# Patient Record
Sex: FEMALE | Race: WHITE | Employment: OTHER | URBAN - NONMETROPOLITAN AREA
[De-identification: names, ages, dates, MRNs, and addresses within clinical notes are randomized per-mention and may not be internally consistent; named-entity substitution may affect disease eponyms.]

---

## 2017-01-03 RX ORDER — ALBUTEROL SULFATE 2.5 MG/3ML
SOLUTION RESPIRATORY (INHALATION)
Qty: 375 VIAL | Refills: 3 | Status: SHIPPED | OUTPATIENT
Start: 2017-01-03 | End: 2018-06-14 | Stop reason: SDUPTHER

## 2017-01-26 RX ORDER — BENZONATATE 100 MG/1
CAPSULE ORAL
Qty: 30 CAPSULE | Refills: 1 | Status: SHIPPED | OUTPATIENT
Start: 2017-01-26 | End: 2017-06-27 | Stop reason: SDUPTHER

## 2017-02-27 RX ORDER — EFAVIRENZ, EMTRICITABINE AND TENOFOVIR DISOPROXIL FUMARATE 600; 200; 300 MG/1; MG/1; MG/1
1 TABLET, FILM COATED ORAL NIGHTLY
Qty: 30 TABLET | Refills: 2 | Status: SHIPPED | OUTPATIENT
Start: 2017-02-27 | End: 2017-06-27 | Stop reason: SDUPTHER

## 2017-03-29 RX ORDER — CETIRIZINE HCL/PSEUDOEPHEDRINE 5 MG-120MG
TABLET, EXTENDED RELEASE 12 HR ORAL
Qty: 60 TABLET | Refills: 3 | Status: SHIPPED | OUTPATIENT
Start: 2017-03-29 | End: 2017-07-25 | Stop reason: SDUPTHER

## 2017-04-03 ENCOUNTER — TELEPHONE (OUTPATIENT)
Dept: PULMONOLOGY | Age: 57
End: 2017-04-03

## 2017-04-03 DIAGNOSIS — J43.1 PANLOBULAR EMPHYSEMA (HCC): Primary | ICD-10-CM

## 2017-04-26 ENCOUNTER — OFFICE VISIT (OUTPATIENT)
Dept: FAMILY MEDICINE CLINIC | Age: 57
End: 2017-04-26

## 2017-04-26 VITALS
DIASTOLIC BLOOD PRESSURE: 74 MMHG | HEART RATE: 88 BPM | SYSTOLIC BLOOD PRESSURE: 116 MMHG | HEIGHT: 65 IN | RESPIRATION RATE: 16 BRPM | WEIGHT: 166 LBS | BODY MASS INDEX: 27.66 KG/M2

## 2017-04-26 DIAGNOSIS — I25.10 CORONARY ARTERY DISEASE INVOLVING NATIVE CORONARY ARTERY OF NATIVE HEART WITHOUT ANGINA PECTORIS: ICD-10-CM

## 2017-04-26 DIAGNOSIS — E78.5 HYPERLIPIDEMIA, UNSPECIFIED HYPERLIPIDEMIA TYPE: ICD-10-CM

## 2017-04-26 DIAGNOSIS — M25.561 RIGHT KNEE PAIN, UNSPECIFIED CHRONICITY: Primary | ICD-10-CM

## 2017-04-26 DIAGNOSIS — G89.4 CHRONIC PAIN SYNDROME: ICD-10-CM

## 2017-04-26 DIAGNOSIS — K21.9 GASTROESOPHAGEAL REFLUX DISEASE, ESOPHAGITIS PRESENCE NOT SPECIFIED: ICD-10-CM

## 2017-04-26 DIAGNOSIS — F31.9 BIPOLAR DISEASE, CHRONIC (HCC): ICD-10-CM

## 2017-04-26 DIAGNOSIS — B20 HIV (HUMAN IMMUNODEFICIENCY VIRUS INFECTION) (HCC): ICD-10-CM

## 2017-04-26 DIAGNOSIS — J44.9 CHRONIC OBSTRUCTIVE ASTHMA (HCC): Chronic | ICD-10-CM

## 2017-04-26 PROCEDURE — 99214 OFFICE O/P EST MOD 30 MIN: CPT | Performed by: FAMILY MEDICINE

## 2017-04-26 RX ORDER — ALBUTEROL SULFATE 90 UG/1
AEROSOL, METERED RESPIRATORY (INHALATION)
Qty: 36 G | Refills: 1 | Status: SHIPPED | OUTPATIENT
Start: 2017-04-26 | End: 2017-07-25 | Stop reason: SDUPTHER

## 2017-04-26 RX ORDER — BENZONATATE 100 MG/1
CAPSULE ORAL
Qty: 30 CAPSULE | Refills: 1 | Status: CANCELLED | OUTPATIENT
Start: 2017-04-26

## 2017-04-26 RX ORDER — TRAZODONE HYDROCHLORIDE 100 MG/1
100 TABLET ORAL NIGHTLY
Status: CANCELLED | OUTPATIENT
Start: 2017-04-26

## 2017-04-26 ASSESSMENT — PATIENT HEALTH QUESTIONNAIRE - PHQ9
1. LITTLE INTEREST OR PLEASURE IN DOING THINGS: 0
SUM OF ALL RESPONSES TO PHQ9 QUESTIONS 1 & 2: 0
2. FEELING DOWN, DEPRESSED OR HOPELESS: 0
SUM OF ALL RESPONSES TO PHQ QUESTIONS 1-9: 0

## 2017-04-28 DIAGNOSIS — M25.561 CHRONIC PAIN OF RIGHT KNEE: Primary | ICD-10-CM

## 2017-04-28 DIAGNOSIS — G89.29 CHRONIC PAIN OF RIGHT KNEE: Primary | ICD-10-CM

## 2017-04-30 ASSESSMENT — ENCOUNTER SYMPTOMS
RESPIRATORY NEGATIVE: 1
GASTROINTESTINAL NEGATIVE: 1

## 2017-05-08 ENCOUNTER — TELEPHONE (OUTPATIENT)
Dept: FAMILY MEDICINE CLINIC | Age: 57
End: 2017-05-08

## 2017-06-14 PROBLEM — J96.22 ACUTE ON CHRONIC RESPIRATORY FAILURE WITH HYPOXIA AND HYPERCAPNIA (HCC): Status: ACTIVE | Noted: 2017-06-14

## 2017-06-14 PROBLEM — J96.21 ACUTE ON CHRONIC RESPIRATORY FAILURE WITH HYPOXIA AND HYPERCAPNIA (HCC): Status: ACTIVE | Noted: 2017-06-14

## 2017-06-19 ENCOUNTER — TELEPHONE (OUTPATIENT)
Dept: FAMILY MEDICINE CLINIC | Age: 57
End: 2017-06-19

## 2017-06-27 ENCOUNTER — OFFICE VISIT (OUTPATIENT)
Dept: FAMILY MEDICINE CLINIC | Age: 57
End: 2017-06-27

## 2017-06-27 VITALS
RESPIRATION RATE: 14 BRPM | DIASTOLIC BLOOD PRESSURE: 70 MMHG | WEIGHT: 161 LBS | SYSTOLIC BLOOD PRESSURE: 106 MMHG | OXYGEN SATURATION: 92 % | HEART RATE: 84 BPM | HEIGHT: 64 IN | BODY MASS INDEX: 27.49 KG/M2

## 2017-06-27 DIAGNOSIS — J44.1 COPD WITH EXACERBATION (HCC): Primary | ICD-10-CM

## 2017-06-27 DIAGNOSIS — J96.22 ACUTE ON CHRONIC RESPIRATORY FAILURE WITH HYPOXIA AND HYPERCAPNIA (HCC): ICD-10-CM

## 2017-06-27 DIAGNOSIS — Z72.0 TOBACCO ABUSE: ICD-10-CM

## 2017-06-27 DIAGNOSIS — J96.21 ACUTE ON CHRONIC RESPIRATORY FAILURE WITH HYPOXIA AND HYPERCAPNIA (HCC): ICD-10-CM

## 2017-06-27 PROCEDURE — 99495 TRANSJ CARE MGMT MOD F2F 14D: CPT | Performed by: FAMILY MEDICINE

## 2017-06-27 RX ORDER — BENZONATATE 100 MG/1
CAPSULE ORAL
Qty: 30 CAPSULE | Refills: 1 | Status: SHIPPED | OUTPATIENT
Start: 2017-06-27 | End: 2017-07-25

## 2017-06-27 RX ORDER — EFAVIRENZ, EMTRICITABINE AND TENOFOVIR DISOPROXIL FUMARATE 600; 200; 300 MG/1; MG/1; MG/1
1 TABLET, FILM COATED ORAL NIGHTLY
Qty: 30 TABLET | Refills: 2 | Status: CANCELLED | OUTPATIENT
Start: 2017-06-27

## 2017-06-27 RX ORDER — HYDROCODONE BITARTRATE AND ACETAMINOPHEN 5; 325 MG/1; MG/1
TABLET ORAL
Refills: 0 | COMMUNITY
Start: 2017-05-25 | End: 2017-10-09

## 2017-06-27 RX ORDER — DOXYCYCLINE 100 MG/1
CAPSULE ORAL
COMMUNITY
Start: 2017-06-16 | End: 2018-06-12

## 2017-06-27 RX ORDER — EFAVIRENZ, EMTRICITABINE, AND TENOFOVIR DISOPROXIL FUMARATE 600; 200; 300 MG/1; MG/1; MG/1
TABLET, FILM COATED ORAL
Qty: 30 TABLET | Refills: 2 | Status: SHIPPED | OUTPATIENT
Start: 2017-06-27 | End: 2019-07-09 | Stop reason: SDUPTHER

## 2017-07-24 ENCOUNTER — HOSPITAL ENCOUNTER (OUTPATIENT)
Dept: PULMONOLOGY | Age: 57
Discharge: HOME | End: 2017-07-24
Payer: COMMERCIAL

## 2017-07-24 DIAGNOSIS — J44.1 COPD WITH EXACERBATION (HCC): ICD-10-CM

## 2017-07-24 PROCEDURE — 94726 PLETHYSMOGRAPHY LUNG VOLUMES: CPT

## 2017-07-24 PROCEDURE — 94060 EVALUATION OF WHEEZING: CPT

## 2017-07-24 PROCEDURE — 94729 DIFFUSING CAPACITY: CPT

## 2017-07-25 ENCOUNTER — OFFICE VISIT (OUTPATIENT)
Dept: FAMILY MEDICINE CLINIC | Age: 57
End: 2017-07-25
Payer: COMMERCIAL

## 2017-07-25 VITALS
BODY MASS INDEX: 27.52 KG/M2 | HEART RATE: 88 BPM | WEIGHT: 161.2 LBS | RESPIRATION RATE: 16 BRPM | HEIGHT: 64 IN | TEMPERATURE: 98.1 F | DIASTOLIC BLOOD PRESSURE: 60 MMHG | SYSTOLIC BLOOD PRESSURE: 120 MMHG

## 2017-07-25 DIAGNOSIS — J44.9 COPD, SEVERE (HCC): Primary | ICD-10-CM

## 2017-07-25 PROCEDURE — 99213 OFFICE O/P EST LOW 20 MIN: CPT | Performed by: FAMILY MEDICINE

## 2017-07-25 RX ORDER — ALBUTEROL SULFATE 90 UG/1
AEROSOL, METERED RESPIRATORY (INHALATION)
Qty: 36 G | Refills: 1 | Status: ON HOLD | OUTPATIENT
Start: 2017-07-25 | End: 2018-07-24 | Stop reason: SDUPTHER

## 2017-07-25 RX ORDER — CETIRIZINE HYDROCHLORIDE, PSEUDOEPHEDRINE HYDROCHLORIDE 5; 120 MG/1; MG/1
TABLET, FILM COATED, EXTENDED RELEASE ORAL
Qty: 60 TABLET | Refills: 2 | Status: SHIPPED | OUTPATIENT
Start: 2017-07-25 | End: 2018-06-14 | Stop reason: SDUPTHER

## 2017-07-25 RX ORDER — GUAIFENESIN 600 MG/1
600 TABLET, EXTENDED RELEASE ORAL 2 TIMES DAILY
Qty: 60 TABLET | Refills: 2 | Status: SHIPPED | OUTPATIENT
Start: 2017-07-25 | End: 2017-10-20 | Stop reason: SDUPTHER

## 2017-07-25 RX ORDER — DOXYCYCLINE 100 MG/1
CAPSULE ORAL
Status: CANCELLED | OUTPATIENT
Start: 2017-07-25

## 2017-07-25 ASSESSMENT — ENCOUNTER SYMPTOMS
WHEEZING: 0
COUGH: 1
GASTROINTESTINAL NEGATIVE: 1
SHORTNESS OF BREATH: 0

## 2017-08-08 RX ORDER — BENZONATATE 100 MG/1
CAPSULE ORAL
Qty: 30 CAPSULE | Refills: 10 | Status: SHIPPED | OUTPATIENT
Start: 2017-08-08 | End: 2018-08-15 | Stop reason: SDUPTHER

## 2017-08-23 RX ORDER — EFAVIRENZ, EMTRICITABINE, AND TENOFOVIR DISOPROXIL FUMARATE 600; 200; 300 MG/1; MG/1; MG/1
TABLET, FILM COATED ORAL
Qty: 30 TABLET | Refills: 1 | OUTPATIENT
Start: 2017-08-23

## 2017-08-25 RX ORDER — EFAVIRENZ, EMTRICITABINE AND TENOFOVIR DISOPROXIL FUMARATE 600; 200; 300 MG/1; MG/1; MG/1
TABLET, FILM COATED ORAL
Qty: 30 TABLET | Refills: 2 | OUTPATIENT
Start: 2017-08-25

## 2017-09-18 RX ORDER — MIRTAZAPINE 30 MG/1
15-30 TABLET, FILM COATED ORAL NIGHTLY
Qty: 30 TABLET | Refills: 2 | Status: SHIPPED | OUTPATIENT
Start: 2017-09-18 | End: 2017-11-22 | Stop reason: SDUPTHER

## 2017-10-09 ENCOUNTER — APPOINTMENT (OUTPATIENT)
Dept: GENERAL RADIOLOGY | Age: 57
End: 2017-10-09
Payer: COMMERCIAL

## 2017-10-09 ENCOUNTER — HOSPITAL ENCOUNTER (EMERGENCY)
Age: 57
Discharge: HOME | End: 2017-10-09
Attending: FAMILY MEDICINE
Payer: COMMERCIAL

## 2017-10-09 VITALS
SYSTOLIC BLOOD PRESSURE: 105 MMHG | HEIGHT: 65 IN | OXYGEN SATURATION: 95 % | WEIGHT: 164 LBS | TEMPERATURE: 98.1 F | BODY MASS INDEX: 27.32 KG/M2 | RESPIRATION RATE: 18 BRPM | DIASTOLIC BLOOD PRESSURE: 71 MMHG | HEART RATE: 56 BPM

## 2017-10-09 DIAGNOSIS — J20.9 ACUTE BRONCHITIS, UNSPECIFIED ORGANISM: Primary | ICD-10-CM

## 2017-10-09 DIAGNOSIS — J02.9 ACUTE PHARYNGITIS, UNSPECIFIED ETIOLOGY: ICD-10-CM

## 2017-10-09 LAB
ALLEN TEST: POSITIVE
ANION GAP SERPL CALCULATED.3IONS-SCNC: 12 MEQ/L (ref 8–16)
ANISOCYTOSIS: ABNORMAL
BASE EXCESS (CALCULATED): 0.9 MMOL/L (ref -2.5–2.5)
BASOPHILS # BLD: 0.8 %
BASOPHILS ABSOLUTE: 0.1 THOU/MM3 (ref 0–0.1)
BUN BLDV-MCNC: 11 MG/DL (ref 7–22)
CALCIUM SERPL-MCNC: 9 MG/DL (ref 8.5–10.5)
CHLORIDE BLD-SCNC: 104 MEQ/L (ref 98–111)
CO2: 23 MEQ/L (ref 23–33)
COLLECTED BY:: ABNORMAL
CREAT SERPL-MCNC: 0.6 MG/DL (ref 0.4–1.2)
DEVICE: ABNORMAL
EOSINOPHIL # BLD: 2.7 %
EOSINOPHILS ABSOLUTE: 0.2 THOU/MM3 (ref 0–0.4)
GFR SERPL CREATININE-BSD FRML MDRD: > 90 ML/MIN/1.73M2
GLUCOSE BLD-MCNC: 87 MG/DL (ref 70–108)
HCO3: 24 MMOL/L (ref 23–28)
HCT VFR BLD CALC: 42.2 % (ref 37–47)
HEMOGLOBIN: 14.2 GM/DL (ref 12–16)
LACTIC ACID: 0.8 MMOL/L (ref 0.5–2.2)
LYMPHOCYTES # BLD: 31.1 %
LYMPHOCYTES ABSOLUTE: 2.3 THOU/MM3 (ref 1–4.8)
MCH RBC QN AUTO: 31.7 PG (ref 27–31)
MCHC RBC AUTO-ENTMCNC: 33.7 GM/DL (ref 33–37)
MCV RBC AUTO: 93.9 FL (ref 81–99)
MONOCYTES # BLD: 8.4 %
MONOCYTES ABSOLUTE: 0.6 THOU/MM3 (ref 0.4–1.3)
NUCLEATED RED BLOOD CELLS: 0 /100 WBC
O2 SATURATION: 97 %
OSMOLALITY CALCULATION: 276.3 MOSMOL/KG (ref 275–300)
PCO2: 35 MMHG (ref 35–45)
PDW BLD-RTO: 14.5 % (ref 11.5–14.5)
PH BLOOD GAS: 7.46 (ref 7.35–7.45)
PLATELET # BLD: 284 THOU/MM3 (ref 130–400)
PMV BLD AUTO: 8.6 MCM (ref 7.4–10.4)
PO2: 80 MMHG (ref 71–104)
POTASSIUM SERPL-SCNC: 4.2 MEQ/L (ref 3.5–5.2)
PROCALCITONIN: < 0.02 NG/ML (ref 0.01–0.09)
RBC # BLD: 4.49 MILL/MM3 (ref 4.2–5.4)
RBC # BLD: NORMAL 10*6/UL
SEG NEUTROPHILS: 57 %
SEGMENTED NEUTROPHILS ABSOLUTE COUNT: 4.2 THOU/MM3 (ref 1.8–7.7)
SODIUM BLD-SCNC: 139 MEQ/L (ref 135–145)
SOURCE, BLOOD GAS: ABNORMAL
WBC # BLD: 7.3 THOU/MM3 (ref 4.8–10.8)

## 2017-10-09 PROCEDURE — 36415 COLL VENOUS BLD VENIPUNCTURE: CPT

## 2017-10-09 PROCEDURE — 82803 BLOOD GASES ANY COMBINATION: CPT

## 2017-10-09 PROCEDURE — 6360000002 HC RX W HCPCS: Performed by: FAMILY MEDICINE

## 2017-10-09 PROCEDURE — 6370000000 HC RX 637 (ALT 250 FOR IP): Performed by: FAMILY MEDICINE

## 2017-10-09 PROCEDURE — 94640 AIRWAY INHALATION TREATMENT: CPT

## 2017-10-09 PROCEDURE — 71010 XR CHEST PORTABLE: CPT

## 2017-10-09 PROCEDURE — 85025 COMPLETE CBC W/AUTO DIFF WBC: CPT

## 2017-10-09 PROCEDURE — 99283 EMERGENCY DEPT VISIT LOW MDM: CPT

## 2017-10-09 PROCEDURE — 2580000003 HC RX 258: Performed by: FAMILY MEDICINE

## 2017-10-09 PROCEDURE — 96374 THER/PROPH/DIAG INJ IV PUSH: CPT

## 2017-10-09 PROCEDURE — 84145 PROCALCITONIN (PCT): CPT

## 2017-10-09 PROCEDURE — 80048 BASIC METABOLIC PNL TOTAL CA: CPT

## 2017-10-09 PROCEDURE — 96361 HYDRATE IV INFUSION ADD-ON: CPT

## 2017-10-09 PROCEDURE — 87040 BLOOD CULTURE FOR BACTERIA: CPT

## 2017-10-09 PROCEDURE — 83605 ASSAY OF LACTIC ACID: CPT

## 2017-10-09 PROCEDURE — 36600 WITHDRAWAL OF ARTERIAL BLOOD: CPT

## 2017-10-09 RX ORDER — 0.9 % SODIUM CHLORIDE 0.9 %
500 INTRAVENOUS SOLUTION INTRAVENOUS ONCE
Status: COMPLETED | OUTPATIENT
Start: 2017-10-09 | End: 2017-10-09

## 2017-10-09 RX ORDER — HYDROCODONE BITARTRATE AND ACETAMINOPHEN 5; 325 MG/1; MG/1
1 TABLET ORAL ONCE
Status: COMPLETED | OUTPATIENT
Start: 2017-10-09 | End: 2017-10-09

## 2017-10-09 RX ORDER — SODIUM CHLORIDE 9 MG/ML
INJECTION, SOLUTION INTRAVENOUS CONTINUOUS
Status: DISCONTINUED | OUTPATIENT
Start: 2017-10-09 | End: 2017-10-09 | Stop reason: HOSPADM

## 2017-10-09 RX ORDER — AMOXICILLIN 250 MG/1
250 CAPSULE ORAL ONCE
Status: COMPLETED | OUTPATIENT
Start: 2017-10-09 | End: 2017-10-09

## 2017-10-09 RX ORDER — AMOXICILLIN AND CLAVULANATE POTASSIUM 500; 125 MG/1; MG/1
1 TABLET, FILM COATED ORAL 3 TIMES DAILY
Qty: 30 TABLET | Refills: 0 | Status: SHIPPED | OUTPATIENT
Start: 2017-10-09 | End: 2017-10-19

## 2017-10-09 RX ORDER — IPRATROPIUM BROMIDE AND ALBUTEROL SULFATE 2.5; .5 MG/3ML; MG/3ML
1 SOLUTION RESPIRATORY (INHALATION) ONCE
Status: COMPLETED | OUTPATIENT
Start: 2017-10-09 | End: 2017-10-09

## 2017-10-09 RX ORDER — METHYLPREDNISOLONE SODIUM SUCCINATE 40 MG/ML
40 INJECTION, POWDER, LYOPHILIZED, FOR SOLUTION INTRAMUSCULAR; INTRAVENOUS ONCE
Status: COMPLETED | OUTPATIENT
Start: 2017-10-09 | End: 2017-10-09

## 2017-10-09 RX ORDER — AMOXICILLIN AND CLAVULANATE POTASSIUM 250; 125 MG/1; MG/1
1 TABLET, FILM COATED ORAL ONCE
Status: COMPLETED | OUTPATIENT
Start: 2017-10-09 | End: 2017-10-09

## 2017-10-09 RX ADMIN — AMOXICILLIN 250 MG: 250 CAPSULE ORAL at 13:05

## 2017-10-09 RX ADMIN — HYDROCODONE BITARTRATE AND ACETAMINOPHEN 1 TABLET: 5; 325 TABLET ORAL at 11:33

## 2017-10-09 RX ADMIN — IPRATROPIUM BROMIDE AND ALBUTEROL SULFATE 1 AMPULE: .5; 3 SOLUTION RESPIRATORY (INHALATION) at 11:21

## 2017-10-09 RX ADMIN — AMOXICILLIN AND CLAVULANATE POTASSIUM 1 TABLET: 250; 125 TABLET, FILM COATED ORAL at 13:05

## 2017-10-09 RX ADMIN — METHYLPREDNISOLONE SODIUM SUCCINATE 40 MG: 40 INJECTION, POWDER, FOR SOLUTION INTRAMUSCULAR; INTRAVENOUS at 11:33

## 2017-10-09 RX ADMIN — SODIUM CHLORIDE 500 ML: 9 INJECTION, SOLUTION INTRAVENOUS at 11:32

## 2017-10-09 ASSESSMENT — ENCOUNTER SYMPTOMS
EYE DISCHARGE: 0
SORE THROAT: 1
COUGH: 1
SHORTNESS OF BREATH: 1
ABDOMINAL PAIN: 0
WHEEZING: 1

## 2017-10-09 ASSESSMENT — PAIN DESCRIPTION - PAIN TYPE: TYPE: ACUTE PAIN

## 2017-10-09 ASSESSMENT — PAIN SCALES - GENERAL
PAINLEVEL_OUTOF10: 4
PAINLEVEL_OUTOF10: 10

## 2017-10-09 ASSESSMENT — PAIN DESCRIPTION - LOCATION: LOCATION: THROAT

## 2017-10-09 NOTE — ED PROVIDER NOTES
[facility]  eMERGENCY dEPARTMENT eNCOUnter          CHIEF COMPLAINT       Chief Complaint   Patient presents with    Pharyngitis       Nurses Notes reviewed and I agree except as noted in the HPI. HISTORY OF PRESENT ILLNESS    Nicolas Davis is a 62 y.o. female who presents to the Emergency Department for the evaluation of sore throat. The patient states she developed a sore throat three days ago and it has gotten progressively worse. She is rating her current pain as an 8/10 in severity and describing it as sharp in character. The patient states the pain is constant and worse with talking. She denies any relief with aleve at home. She states she is having troubles swallowing secondary to pain, and has not been able to sleep from the pain. The patient reports associated cough. She reports history of COPD and has used albuterol inhaler and nebulizer without relief. The patient states she quit smoking 2 weeks ago, but has a 50 pack year history. Location/Symptom: sore throat  Timing/Onset: 3 days ago  Context/Setting: troubles swallowing and sleeping secondary to pain, associated cough, history of COPD, 50 pack year history  Quality: sharp  Duration: constant  Modifying Factors: worse with talking or trying to eat, no relief with aleve  Severity: 8/10    The HPI was provided by the patient. REVIEW OF SYSTEMS     Review of Systems   Constitutional: Negative for chills and fever. HENT: Positive for congestion and sore throat. Eyes: Negative for discharge. Respiratory: Positive for cough, shortness of breath and wheezing. Cardiovascular: Negative for chest pain. Gastrointestinal: Negative for abdominal pain. Genitourinary: Negative for decreased urine volume. Musculoskeletal: Negative for joint swelling. Skin: Negative for rash. Neurological: Negative for weakness and numbness. Psychiatric/Behavioral: Negative for confusion.        PAST MEDICAL HISTORY    has a past times daily    TUDORZA PRESSAIR 400 MCG/ACT AEPB INHALER    inhale 1 puff INTO THE LUNGS twice a day       ALLERGIES     is allergic to aspirin; biaxin [clarithromycin]; levofloxacin; and vancomycin. FAMILY HISTORY     indicated that her mother is . She indicated that her father is . family history includes Asthma in her father and mother; [place] in her father; Heart Disease in her mother; Uterine Cancer in her mother. SOCIAL HISTORY      reports that she quit smoking about 3 months ago. Her smoking use included Cigarettes. She started smoking about 43 years ago. She has a 9.75 pack-year smoking history. She has never used smokeless tobacco. She reports that she does not drink alcohol or use drugs. PHYSICAL EXAM     INITIAL VITALS:  height is 5' 5\" (1.651 m) and weight is 164 lb (74.4 kg). Her oral temperature is 98.1 °F (36.7 °C). Her blood pressure is 138/84 and her pulse is 58. Her respiration is 18 and oxygen saturation is 95%. Physical Exam   Constitutional: She is oriented to person, place, and time. She appears well-developed and well-nourished. GCS 15   HENT:   Head: Normocephalic and atraumatic. Ear canals clear TMs normal    Nasal obstruction bilateral    Throat slight redness no exudates   Eyes: Conjunctivae and EOM are normal. Pupils are equal, round, and reactive to light. Right eye exhibits no discharge. Left eye exhibits no discharge. Neck: Normal range of motion. Neck supple. Cardiovascular: Normal rate and regular rhythm. Pulmonary/Chest:   Scattered rhonchi   Abdominal: Soft. Bowel sounds are normal. There is no tenderness. There is no rebound and no guarding. Musculoskeletal: Normal range of motion. She exhibits no edema. Lymphadenopathy:     She has no cervical adenopathy. Neurological: She is alert and oriented to person, place, and time. Skin: Skin is warm and dry. Psychiatric: She has a normal mood and affect.  Her behavior is normal. DO Chacorta Arrington, [address] [facility]  [phone]    In 3 days        DISCHARGE MEDICATIONS:  New Prescriptions    AMOXICILLIN-CLAVULANATE (AUGMENTIN) 500-125 MG PER TABLET    Take 1 tablet by mouth 3 times daily for 10 days       (Please note that portions of this note were completed with a voice recognition program.  Efforts were made to edit the dictations but occasionally words are mis-transcribed.)    Scribe:  Calin Schaefer 10/9/17 11:11 AM Scribing for and in the presence of Carli Coppola MD.    Signed by: Jackson Wolfe, 10/09/17 12:35 PM    Provider:  I personally performed the services described in the documentation, reviewed and edited the documentation which was dictated to the scribe in my presence, and it accurately records my words and actions.     Carli Coppola MD 10/9/17 12:35 PM                      Carli Coppola MD  10/09/17 [address] [place]  10/09/17 0922

## 2017-10-15 LAB
BLOOD CULTURE, ROUTINE: NORMAL
BLOOD CULTURE, ROUTINE: NORMAL

## 2017-10-26 ENCOUNTER — OFFICE VISIT (OUTPATIENT)
Dept: PHYSICAL MEDICINE AND REHAB | Age: 57
End: 2017-10-26
Payer: COMMERCIAL

## 2017-10-26 VITALS
HEIGHT: 65 IN | HEART RATE: 83 BPM | WEIGHT: 164 LBS | SYSTOLIC BLOOD PRESSURE: 116 MMHG | BODY MASS INDEX: 27.32 KG/M2 | DIASTOLIC BLOOD PRESSURE: 65 MMHG

## 2017-10-26 DIAGNOSIS — G89.4 CHRONIC PAIN SYNDROME: ICD-10-CM

## 2017-10-26 DIAGNOSIS — M17.11 PRIMARY OSTEOARTHRITIS OF RIGHT KNEE: Primary | ICD-10-CM

## 2017-10-26 PROCEDURE — G8484 FLU IMMUNIZE NO ADMIN: HCPCS | Performed by: PAIN MEDICINE

## 2017-10-26 PROCEDURE — 99244 OFF/OP CNSLTJ NEW/EST MOD 40: CPT | Performed by: PAIN MEDICINE

## 2017-10-26 PROCEDURE — G8417 CALC BMI ABV UP PARAM F/U: HCPCS | Performed by: PAIN MEDICINE

## 2017-10-26 PROCEDURE — 3014F SCREEN MAMMO DOC REV: CPT | Performed by: PAIN MEDICINE

## 2017-10-26 PROCEDURE — 3017F COLORECTAL CA SCREEN DOC REV: CPT | Performed by: PAIN MEDICINE

## 2017-10-26 PROCEDURE — G8427 DOCREV CUR MEDS BY ELIG CLIN: HCPCS | Performed by: PAIN MEDICINE

## 2017-10-26 ASSESSMENT — ENCOUNTER SYMPTOMS
CONSTIPATION: 0
DIARRHEA: 0
EYE PAIN: 0
CHEST TIGHTNESS: 0
BACK PAIN: 1
RHINORRHEA: 0
SORE THROAT: 0
SINUS PRESSURE: 0
NAUSEA: 1
VOMITING: 1
ABDOMINAL PAIN: 0
PHOTOPHOBIA: 0
WHEEZING: 0
SHORTNESS OF BREATH: 0
COLOR CHANGE: 0
COUGH: 0
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
## 2017-11-22 RX ORDER — MIRTAZAPINE 30 MG/1
15-30 TABLET, FILM COATED ORAL NIGHTLY
Qty: 30 TABLET | Refills: 2 | Status: SHIPPED | OUTPATIENT
Start: 2017-11-22 | End: 2018-06-14 | Stop reason: SDUPTHER

## 2017-12-18 ENCOUNTER — TELEPHONE (OUTPATIENT)
Dept: FAMILY MEDICINE CLINIC | Age: 57
End: 2017-12-18

## 2017-12-18 NOTE — TELEPHONE ENCOUNTER
Patient notified. She was not happy said Dr Jhonny Grullon told her to call and as Dr aBri Martinez for refill. I advised her to call Dr Jhonny Grullon since he is the one that originally prescribed it for her.

## 2017-12-18 NOTE — TELEPHONE ENCOUNTER
Patient is calling in asking for Dr Abida Phan to prescribe her norco for her pain from the HIV to [employer] [address]. She said she is in a lot of pain, her whole body aches and she needs something to help. Please call her and advise.

## 2018-04-27 ENCOUNTER — OFFICE VISIT (OUTPATIENT)
Dept: CARDIOLOGY CLINIC | Age: 58
End: 2018-04-27
Payer: COMMERCIAL

## 2018-04-27 VITALS
HEIGHT: 65 IN | BODY MASS INDEX: 25.99 KG/M2 | WEIGHT: 156 LBS | DIASTOLIC BLOOD PRESSURE: 60 MMHG | SYSTOLIC BLOOD PRESSURE: 100 MMHG

## 2018-04-27 DIAGNOSIS — I25.10 CORONARY ARTERY DISEASE INVOLVING NATIVE CORONARY ARTERY OF NATIVE HEART WITHOUT ANGINA PECTORIS: Primary | ICD-10-CM

## 2018-04-27 PROCEDURE — G8599 NO ASA/ANTIPLAT THER USE RNG: HCPCS | Performed by: INTERNAL MEDICINE

## 2018-04-27 PROCEDURE — 99214 OFFICE O/P EST MOD 30 MIN: CPT | Performed by: INTERNAL MEDICINE

## 2018-04-27 PROCEDURE — 3017F COLORECTAL CA SCREEN DOC REV: CPT | Performed by: INTERNAL MEDICINE

## 2018-04-27 PROCEDURE — 1036F TOBACCO NON-USER: CPT | Performed by: INTERNAL MEDICINE

## 2018-04-27 PROCEDURE — 93000 ELECTROCARDIOGRAM COMPLETE: CPT | Performed by: INTERNAL MEDICINE

## 2018-04-27 PROCEDURE — G8417 CALC BMI ABV UP PARAM F/U: HCPCS | Performed by: INTERNAL MEDICINE

## 2018-04-27 PROCEDURE — G8427 DOCREV CUR MEDS BY ELIG CLIN: HCPCS | Performed by: INTERNAL MEDICINE

## 2018-04-27 ASSESSMENT — ENCOUNTER SYMPTOMS
BACK PAIN: 0
ORTHOPNEA: 0
EYE DISCHARGE: 0
SHORTNESS OF BREATH: 0
BLOATING: 0
DIARRHEA: 0
HEMOPTYSIS: 0
EYE PAIN: 0
ABDOMINAL PAIN: 0
CONSTIPATION: 0
BLURRED VISION: 0
SPUTUM PRODUCTION: 0
HOARSE VOICE: 0
BOWEL INCONTINENCE: 0
COUGH: 0
DOUBLE VISION: 0
CHANGE IN BOWEL HABIT: 0

## 2018-04-30 ENCOUNTER — OFFICE VISIT (OUTPATIENT)
Dept: FAMILY MEDICINE CLINIC | Age: 58
End: 2018-04-30
Payer: COMMERCIAL

## 2018-04-30 VITALS
SYSTOLIC BLOOD PRESSURE: 116 MMHG | HEIGHT: 64 IN | RESPIRATION RATE: 20 BRPM | DIASTOLIC BLOOD PRESSURE: 64 MMHG | HEART RATE: 84 BPM | WEIGHT: 157.4 LBS | BODY MASS INDEX: 26.87 KG/M2 | OXYGEN SATURATION: 97 %

## 2018-04-30 DIAGNOSIS — N39.3 STRESS INCONTINENCE: ICD-10-CM

## 2018-04-30 DIAGNOSIS — B20 HIV (HUMAN IMMUNODEFICIENCY VIRUS INFECTION) (HCC): ICD-10-CM

## 2018-04-30 DIAGNOSIS — J44.9 CHRONIC OBSTRUCTIVE PULMONARY DISEASE, UNSPECIFIED COPD TYPE (HCC): Primary | ICD-10-CM

## 2018-04-30 DIAGNOSIS — D84.9 IMMUNOCOMPROMISED STATE (HCC): ICD-10-CM

## 2018-04-30 DIAGNOSIS — F20.9 SCHIZOPHRENIA, UNSPECIFIED TYPE (HCC): ICD-10-CM

## 2018-04-30 DIAGNOSIS — I25.10 CORONARY ARTERY DISEASE INVOLVING NATIVE CORONARY ARTERY OF NATIVE HEART WITHOUT ANGINA PECTORIS: ICD-10-CM

## 2018-04-30 DIAGNOSIS — Z12.39 SCREENING FOR BREAST CANCER: ICD-10-CM

## 2018-04-30 DIAGNOSIS — J45.20 MILD INTERMITTENT REACTIVE AIRWAY DISEASE WITHOUT COMPLICATION: ICD-10-CM

## 2018-04-30 DIAGNOSIS — G89.4 CHRONIC PAIN SYNDROME: ICD-10-CM

## 2018-04-30 DIAGNOSIS — Z13.31 POSITIVE DEPRESSION SCREENING: ICD-10-CM

## 2018-04-30 DIAGNOSIS — F31.9 BIPOLAR DISEASE, CHRONIC (HCC): ICD-10-CM

## 2018-04-30 DIAGNOSIS — J44.9 CHRONIC OBSTRUCTIVE ASTHMA (HCC): ICD-10-CM

## 2018-04-30 DIAGNOSIS — Z72.0 TOBACCO ABUSE: ICD-10-CM

## 2018-04-30 PROCEDURE — 99214 OFFICE O/P EST MOD 30 MIN: CPT | Performed by: FAMILY MEDICINE

## 2018-04-30 PROCEDURE — G8417 CALC BMI ABV UP PARAM F/U: HCPCS | Performed by: FAMILY MEDICINE

## 2018-04-30 PROCEDURE — G8427 DOCREV CUR MEDS BY ELIG CLIN: HCPCS | Performed by: FAMILY MEDICINE

## 2018-04-30 PROCEDURE — 3017F COLORECTAL CA SCREEN DOC REV: CPT | Performed by: FAMILY MEDICINE

## 2018-04-30 PROCEDURE — G8431 POS CLIN DEPRES SCRN F/U DOC: HCPCS | Performed by: FAMILY MEDICINE

## 2018-04-30 PROCEDURE — G8599 NO ASA/ANTIPLAT THER USE RNG: HCPCS | Performed by: FAMILY MEDICINE

## 2018-04-30 PROCEDURE — 96160 PT-FOCUSED HLTH RISK ASSMT: CPT | Performed by: FAMILY MEDICINE

## 2018-04-30 PROCEDURE — G8926 SPIRO NO PERF OR DOC: HCPCS | Performed by: FAMILY MEDICINE

## 2018-04-30 PROCEDURE — 3023F SPIROM DOC REV: CPT | Performed by: FAMILY MEDICINE

## 2018-04-30 PROCEDURE — 1036F TOBACCO NON-USER: CPT | Performed by: FAMILY MEDICINE

## 2018-04-30 RX ORDER — BENZONATATE 100 MG/1
CAPSULE ORAL
Qty: 30 CAPSULE | Refills: 10 | Status: CANCELLED | OUTPATIENT
Start: 2018-04-30

## 2018-04-30 RX ORDER — GUAIFENESIN 600 MG/1
TABLET, EXTENDED RELEASE ORAL
Qty: 60 TABLET | Refills: 1 | Status: CANCELLED | OUTPATIENT
Start: 2018-04-30

## 2018-04-30 RX ORDER — ALBUTEROL SULFATE 90 UG/1
2 AEROSOL, METERED RESPIRATORY (INHALATION) EVERY 4 HOURS PRN
Qty: 1 INHALER | Refills: 3 | Status: SHIPPED | OUTPATIENT
Start: 2018-04-30 | End: 2018-12-11 | Stop reason: SDUPTHER

## 2018-04-30 ASSESSMENT — ENCOUNTER SYMPTOMS
GASTROINTESTINAL NEGATIVE: 1
COUGH: 1
SHORTNESS OF BREATH: 1
BACK PAIN: 1

## 2018-04-30 ASSESSMENT — PATIENT HEALTH QUESTIONNAIRE - PHQ9
9. THOUGHTS THAT YOU WOULD BE BETTER OFF DEAD, OR OF HURTING YOURSELF: 0
4. FEELING TIRED OR HAVING LITTLE ENERGY: 1
3. TROUBLE FALLING OR STAYING ASLEEP: 1
10. IF YOU CHECKED OFF ANY PROBLEMS, HOW DIFFICULT HAVE THESE PROBLEMS MADE IT FOR YOU TO DO YOUR WORK, TAKE CARE OF THINGS AT HOME, OR GET ALONG WITH OTHER PEOPLE: 1
SUM OF ALL RESPONSES TO PHQ9 QUESTIONS 1 & 2: 6
8. MOVING OR SPEAKING SO SLOWLY THAT OTHER PEOPLE COULD HAVE NOTICED. OR THE OPPOSITE, BEING SO FIGETY OR RESTLESS THAT YOU HAVE BEEN MOVING AROUND A LOT MORE THAN USUAL: 0
2. FEELING DOWN, DEPRESSED OR HOPELESS: 3
7. TROUBLE CONCENTRATING ON THINGS, SUCH AS READING THE NEWSPAPER OR WATCHING TELEVISION: 0
1. LITTLE INTEREST OR PLEASURE IN DOING THINGS: 3
5. POOR APPETITE OR OVEREATING: 1
SUM OF ALL RESPONSES TO PHQ QUESTIONS 1-9: 9
6. FEELING BAD ABOUT YOURSELF - OR THAT YOU ARE A FAILURE OR HAVE LET YOURSELF OR YOUR FAMILY DOWN: 0

## 2018-05-04 ENCOUNTER — TELEPHONE (OUTPATIENT)
Dept: CARDIOLOGY CLINIC | Age: 58
End: 2018-05-04

## 2018-05-14 ENCOUNTER — HOSPITAL ENCOUNTER (OUTPATIENT)
Age: 58
Discharge: HOME | End: 2018-05-14
Payer: COMMERCIAL

## 2018-05-14 ENCOUNTER — TELEPHONE (OUTPATIENT)
Dept: CARDIOLOGY CLINIC | Age: 58
End: 2018-05-14

## 2018-05-14 DIAGNOSIS — I25.10 CORONARY ARTERY DISEASE INVOLVING NATIVE CORONARY ARTERY OF NATIVE HEART WITHOUT ANGINA PECTORIS: ICD-10-CM

## 2018-05-14 LAB
ALBUMIN SERPL-MCNC: 4.3 G/DL (ref 3.5–5.1)
ALP BLD-CCNC: 133 U/L (ref 38–126)
ALT SERPL-CCNC: 15 U/L (ref 11–66)
ANION GAP SERPL CALCULATED.3IONS-SCNC: 12 MEQ/L (ref 8–16)
AST SERPL-CCNC: 22 U/L (ref 5–40)
AVERAGE GLUCOSE: 99 MG/DL (ref 70–126)
BASOPHILS # BLD: 1.3 %
BASOPHILS ABSOLUTE: 0.1 THOU/MM3 (ref 0–0.1)
BILIRUB SERPL-MCNC: 0.2 MG/DL (ref 0.3–1.2)
BUN BLDV-MCNC: 11 MG/DL (ref 7–22)
CALCIUM SERPL-MCNC: 9.1 MG/DL (ref 8.5–10.5)
CHLORIDE BLD-SCNC: 99 MEQ/L (ref 98–111)
CHOLESTEROL, TOTAL: 198 MG/DL (ref 100–199)
CO2: 27 MEQ/L (ref 23–33)
CREAT SERPL-MCNC: 0.6 MG/DL (ref 0.4–1.2)
EOSINOPHIL # BLD: 1.3 %
EOSINOPHILS ABSOLUTE: 0.1 THOU/MM3 (ref 0–0.4)
GFR SERPL CREATININE-BSD FRML MDRD: > 90 ML/MIN/1.73M2
GLUCOSE BLD-MCNC: 76 MG/DL (ref 70–108)
HBA1C MFR BLD: 5.3 % (ref 4.4–6.4)
HCT VFR BLD CALC: 44.6 % (ref 37–47)
HDLC SERPL-MCNC: 44 MG/DL
HEMOGLOBIN: 15.3 GM/DL (ref 12–16)
LDL CHOLESTEROL CALCULATED: 92 MG/DL
LYMPHOCYTES # BLD: 28.3 %
LYMPHOCYTES ABSOLUTE: 1.4 THOU/MM3 (ref 1–4.8)
MCH RBC QN AUTO: 30.1 PG (ref 27–31)
MCHC RBC AUTO-ENTMCNC: 34.3 GM/DL (ref 33–37)
MCV RBC AUTO: 87.8 FL (ref 81–99)
MONOCYTES # BLD: 9.6 %
MONOCYTES ABSOLUTE: 0.5 THOU/MM3 (ref 0.4–1.3)
NUCLEATED RED BLOOD CELLS: 0 /100 WBC
PDW BLD-RTO: 13.8 % (ref 11.5–14.5)
PLATELET # BLD: 292 THOU/MM3 (ref 130–400)
PMV BLD AUTO: 8.3 FL (ref 7.4–10.4)
POTASSIUM SERPL-SCNC: 4.5 MEQ/L (ref 3.5–5.2)
RBC # BLD: 5.07 MILL/MM3 (ref 4.2–5.4)
SEG NEUTROPHILS: 59.5 %
SEGMENTED NEUTROPHILS ABSOLUTE COUNT: 3 THOU/MM3 (ref 1.8–7.7)
SODIUM BLD-SCNC: 138 MEQ/L (ref 135–145)
TOTAL PROTEIN: 7.7 G/DL (ref 6.1–8)
TRIGL SERPL-MCNC: 311 MG/DL (ref 0–199)
WBC # BLD: 5 THOU/MM3 (ref 4.8–10.8)

## 2018-05-14 PROCEDURE — 80061 LIPID PANEL: CPT

## 2018-05-14 PROCEDURE — 80053 COMPREHEN METABOLIC PANEL: CPT

## 2018-05-14 PROCEDURE — 85027 COMPLETE CBC AUTOMATED: CPT

## 2018-05-14 PROCEDURE — 85025 COMPLETE CBC W/AUTO DIFF WBC: CPT

## 2018-05-14 PROCEDURE — 83036 HEMOGLOBIN GLYCOSYLATED A1C: CPT

## 2018-05-14 PROCEDURE — 36415 COLL VENOUS BLD VENIPUNCTURE: CPT

## 2018-05-17 ENCOUNTER — HOSPITAL ENCOUNTER (OUTPATIENT)
Dept: INTERVENTIONAL RADIOLOGY/VASCULAR | Age: 58
Discharge: HOME | End: 2018-05-17
Payer: COMMERCIAL

## 2018-05-17 DIAGNOSIS — I25.10 CORONARY ARTERY DISEASE INVOLVING NATIVE CORONARY ARTERY OF NATIVE HEART WITHOUT ANGINA PECTORIS: ICD-10-CM

## 2018-05-17 PROCEDURE — 93922 UPR/L XTREMITY ART 2 LEVELS: CPT

## 2018-06-12 ENCOUNTER — APPOINTMENT (OUTPATIENT)
Dept: GENERAL RADIOLOGY | Age: 58
End: 2018-06-12
Payer: COMMERCIAL

## 2018-06-12 ENCOUNTER — HOSPITAL ENCOUNTER (EMERGENCY)
Age: 58
Discharge: HOME | End: 2018-06-12
Attending: FAMILY MEDICINE
Payer: COMMERCIAL

## 2018-06-12 VITALS
SYSTOLIC BLOOD PRESSURE: 108 MMHG | HEART RATE: 79 BPM | HEIGHT: 65 IN | OXYGEN SATURATION: 93 % | TEMPERATURE: 97.7 F | WEIGHT: 150 LBS | RESPIRATION RATE: 18 BRPM | DIASTOLIC BLOOD PRESSURE: 64 MMHG | BODY MASS INDEX: 24.99 KG/M2

## 2018-06-12 DIAGNOSIS — R07.89 OTHER CHEST PAIN: ICD-10-CM

## 2018-06-12 DIAGNOSIS — J44.1 COPD WITH ACUTE EXACERBATION (HCC): Primary | ICD-10-CM

## 2018-06-12 LAB
ALBUMIN SERPL-MCNC: 4.1 G/DL (ref 3.5–5.1)
ALLEN TEST: POSITIVE
ALP BLD-CCNC: 179 U/L (ref 38–126)
ALT SERPL-CCNC: 23 U/L (ref 11–66)
ANION GAP SERPL CALCULATED.3IONS-SCNC: 15 MEQ/L (ref 8–16)
ANISOCYTOSIS: ABNORMAL
AST SERPL-CCNC: 32 U/L (ref 5–40)
BASE EXCESS (CALCULATED): 1.2 MMOL/L (ref -2.5–2.5)
BASOPHILS # BLD: 1.3 %
BASOPHILS ABSOLUTE: 0.1 THOU/MM3 (ref 0–0.1)
BILIRUB SERPL-MCNC: < 0.2 MG/DL (ref 0.3–1.2)
BUN BLDV-MCNC: 13 MG/DL (ref 7–22)
CALCIUM SERPL-MCNC: 9.3 MG/DL (ref 8.5–10.5)
CHLORIDE BLD-SCNC: 98 MEQ/L (ref 98–111)
CO2: 23 MEQ/L (ref 23–33)
COLLECTED BY:: ABNORMAL
CREAT SERPL-MCNC: 0.7 MG/DL (ref 0.4–1.2)
DEVICE: ABNORMAL
EKG ATRIAL RATE: 82 BPM
EKG P AXIS: 71 DEGREES
EKG P-R INTERVAL: 154 MS
EKG Q-T INTERVAL: 398 MS
EKG QRS DURATION: 94 MS
EKG QTC CALCULATION (BAZETT): 464 MS
EKG R AXIS: 42 DEGREES
EKG T AXIS: 62 DEGREES
EKG VENTRICULAR RATE: 82 BPM
EOSINOPHIL # BLD: 1.7 %
EOSINOPHILS ABSOLUTE: 0.1 THOU/MM3 (ref 0–0.4)
GFR SERPL CREATININE-BSD FRML MDRD: 86 ML/MIN/1.73M2
GLUCOSE BLD-MCNC: 100 MG/DL (ref 70–108)
HCO3: 23 MMOL/L (ref 23–28)
HCT VFR BLD CALC: 44.1 % (ref 37–47)
HEMOGLOBIN: 15.1 GM/DL (ref 12–16)
LYMPHOCYTES # BLD: 23.9 %
LYMPHOCYTES ABSOLUTE: 1.6 THOU/MM3 (ref 1–4.8)
MCH RBC QN AUTO: 30.4 PG (ref 27–31)
MCHC RBC AUTO-ENTMCNC: 34.3 GM/DL (ref 33–37)
MCV RBC AUTO: 88.6 FL (ref 81–99)
MONOCYTES # BLD: 7.4 %
MONOCYTES ABSOLUTE: 0.5 THOU/MM3 (ref 0.4–1.3)
NUCLEATED RED BLOOD CELLS: 0 /100 WBC
O2 SATURATION: 99 %
OSMOLALITY CALCULATION: 272.2 MOSMOL/KG (ref 275–300)
PCO2: 29 MMHG (ref 35–45)
PDW BLD-RTO: 14.9 % (ref 11.5–14.5)
PH BLOOD GAS: 7.51 (ref 7.35–7.45)
PLATELET # BLD: 290 THOU/MM3 (ref 130–400)
PMV BLD AUTO: 8 FL (ref 7.4–10.4)
PO2: 108 MMHG (ref 71–104)
POTASSIUM SERPL-SCNC: 4.7 MEQ/L (ref 3.5–5.2)
PRO-BNP: 47.9 PG/ML (ref 0–900)
PROCALCITONIN: 0.03 NG/ML (ref 0.01–0.09)
RBC # BLD: 4.98 MILL/MM3 (ref 4.2–5.4)
SEG NEUTROPHILS: 65.7 %
SEGMENTED NEUTROPHILS ABSOLUTE COUNT: 4.3 THOU/MM3 (ref 1.8–7.7)
SODIUM BLD-SCNC: 136 MEQ/L (ref 135–145)
SOURCE, BLOOD GAS: ABNORMAL
TOTAL PROTEIN: 7.7 G/DL (ref 6.1–8)
TROPONIN T: < 0.01 NG/ML
WBC # BLD: 6.5 THOU/MM3 (ref 4.8–10.8)

## 2018-06-12 PROCEDURE — 85025 COMPLETE CBC W/AUTO DIFF WBC: CPT

## 2018-06-12 PROCEDURE — 96374 THER/PROPH/DIAG INJ IV PUSH: CPT

## 2018-06-12 PROCEDURE — 94640 AIRWAY INHALATION TREATMENT: CPT

## 2018-06-12 PROCEDURE — 84484 ASSAY OF TROPONIN QUANT: CPT

## 2018-06-12 PROCEDURE — 36600 WITHDRAWAL OF ARTERIAL BLOOD: CPT

## 2018-06-12 PROCEDURE — 93010 ELECTROCARDIOGRAM REPORT: CPT | Performed by: INTERNAL MEDICINE

## 2018-06-12 PROCEDURE — 6370000000 HC RX 637 (ALT 250 FOR IP): Performed by: FAMILY MEDICINE

## 2018-06-12 PROCEDURE — 83880 ASSAY OF NATRIURETIC PEPTIDE: CPT

## 2018-06-12 PROCEDURE — 93005 ELECTROCARDIOGRAM TRACING: CPT | Performed by: FAMILY MEDICINE

## 2018-06-12 PROCEDURE — 80053 COMPREHEN METABOLIC PANEL: CPT

## 2018-06-12 PROCEDURE — 82803 BLOOD GASES ANY COMBINATION: CPT

## 2018-06-12 PROCEDURE — 84145 PROCALCITONIN (PCT): CPT

## 2018-06-12 PROCEDURE — 36415 COLL VENOUS BLD VENIPUNCTURE: CPT

## 2018-06-12 PROCEDURE — 99285 EMERGENCY DEPT VISIT HI MDM: CPT

## 2018-06-12 PROCEDURE — 6360000002 HC RX W HCPCS: Performed by: FAMILY MEDICINE

## 2018-06-12 PROCEDURE — 71046 X-RAY EXAM CHEST 2 VIEWS: CPT

## 2018-06-12 RX ORDER — IPRATROPIUM BROMIDE AND ALBUTEROL SULFATE 2.5; .5 MG/3ML; MG/3ML
1 SOLUTION RESPIRATORY (INHALATION) ONCE
Status: COMPLETED | OUTPATIENT
Start: 2018-06-12 | End: 2018-06-12

## 2018-06-12 RX ORDER — METHYLPREDNISOLONE SODIUM SUCCINATE 125 MG/2ML
125 INJECTION, POWDER, LYOPHILIZED, FOR SOLUTION INTRAMUSCULAR; INTRAVENOUS ONCE
Status: COMPLETED | OUTPATIENT
Start: 2018-06-12 | End: 2018-06-12

## 2018-06-12 RX ORDER — PREDNISONE 20 MG/1
40 TABLET ORAL DAILY
Qty: 10 TABLET | Refills: 0 | Status: SHIPPED | OUTPATIENT
Start: 2018-06-12 | End: 2018-06-17

## 2018-06-12 RX ORDER — ACETAMINOPHEN 500 MG
1000 TABLET ORAL ONCE
Status: COMPLETED | OUTPATIENT
Start: 2018-06-12 | End: 2018-06-12

## 2018-06-12 RX ORDER — ONDANSETRON 4 MG/1
4 TABLET, ORALLY DISINTEGRATING ORAL ONCE
Status: COMPLETED | OUTPATIENT
Start: 2018-06-12 | End: 2018-06-12

## 2018-06-12 RX ORDER — DOXYCYCLINE HYCLATE 100 MG
100 TABLET ORAL 2 TIMES DAILY
Qty: 14 TABLET | Refills: 0 | Status: SHIPPED | OUTPATIENT
Start: 2018-06-12 | End: 2018-06-19

## 2018-06-12 RX ADMIN — ACETAMINOPHEN 1000 MG: 500 TABLET ORAL at 11:25

## 2018-06-12 RX ADMIN — IPRATROPIUM BROMIDE AND ALBUTEROL SULFATE 1 AMPULE: .5; 3 SOLUTION RESPIRATORY (INHALATION) at 11:08

## 2018-06-12 RX ADMIN — IPRATROPIUM BROMIDE AND ALBUTEROL SULFATE 1 AMPULE: .5; 3 SOLUTION RESPIRATORY (INHALATION) at 11:09

## 2018-06-12 RX ADMIN — ONDANSETRON 4 MG: 4 TABLET, ORALLY DISINTEGRATING ORAL at 11:25

## 2018-06-12 RX ADMIN — METHYLPREDNISOLONE SODIUM SUCCINATE 125 MG: 125 INJECTION, POWDER, FOR SOLUTION INTRAMUSCULAR; INTRAVENOUS at 11:25

## 2018-06-12 ASSESSMENT — PAIN SCALES - GENERAL
PAINLEVEL_OUTOF10: 7
PAINLEVEL_OUTOF10: 4
PAINLEVEL_OUTOF10: 7

## 2018-06-12 ASSESSMENT — PAIN DESCRIPTION - LOCATION: LOCATION: CHEST

## 2018-06-12 ASSESSMENT — ENCOUNTER SYMPTOMS
COUGH: 0
SHORTNESS OF BREATH: 1
RHINORRHEA: 0
SORE THROAT: 0
DIARRHEA: 0
CHEST TIGHTNESS: 1
VOMITING: 0
EYE DISCHARGE: 0
BACK PAIN: 0
NAUSEA: 1
WHEEZING: 0
ABDOMINAL PAIN: 0
EYE PAIN: 0

## 2018-06-12 ASSESSMENT — PAIN DESCRIPTION - PAIN TYPE: TYPE: ACUTE PAIN

## 2018-06-12 ASSESSMENT — HEART SCORE: ECG: 1

## 2018-06-12 ASSESSMENT — PAIN DESCRIPTION - ORIENTATION: ORIENTATION: LEFT;MID

## 2018-06-13 ENCOUNTER — CARE COORDINATION (OUTPATIENT)
Dept: CARE COORDINATION | Age: 58
End: 2018-06-13

## 2018-06-13 ASSESSMENT — ENCOUNTER SYMPTOMS: DYSPNEA ASSOCIATED WITH: EXERTION

## 2018-06-14 ENCOUNTER — OFFICE VISIT (OUTPATIENT)
Dept: FAMILY MEDICINE CLINIC | Age: 58
End: 2018-06-14
Payer: COMMERCIAL

## 2018-06-14 ENCOUNTER — TELEPHONE (OUTPATIENT)
Dept: FAMILY MEDICINE CLINIC | Age: 58
End: 2018-06-14

## 2018-06-14 VITALS
WEIGHT: 155 LBS | SYSTOLIC BLOOD PRESSURE: 128 MMHG | OXYGEN SATURATION: 97 % | HEIGHT: 65 IN | RESPIRATION RATE: 16 BRPM | BODY MASS INDEX: 25.83 KG/M2 | HEART RATE: 76 BPM | DIASTOLIC BLOOD PRESSURE: 74 MMHG

## 2018-06-14 DIAGNOSIS — J44.1 COPD WITH ACUTE EXACERBATION (HCC): Primary | ICD-10-CM

## 2018-06-14 DIAGNOSIS — B20 HIV (HUMAN IMMUNODEFICIENCY VIRUS INFECTION) (HCC): ICD-10-CM

## 2018-06-14 DIAGNOSIS — R53.1 GENERAL WEAKNESS: ICD-10-CM

## 2018-06-14 DIAGNOSIS — I25.10 CORONARY ARTERY DISEASE INVOLVING NATIVE CORONARY ARTERY OF NATIVE HEART WITHOUT ANGINA PECTORIS: ICD-10-CM

## 2018-06-14 PROCEDURE — 4004F PT TOBACCO SCREEN RCVD TLK: CPT | Performed by: FAMILY MEDICINE

## 2018-06-14 PROCEDURE — 3017F COLORECTAL CA SCREEN DOC REV: CPT | Performed by: FAMILY MEDICINE

## 2018-06-14 PROCEDURE — G8417 CALC BMI ABV UP PARAM F/U: HCPCS | Performed by: FAMILY MEDICINE

## 2018-06-14 PROCEDURE — 99214 OFFICE O/P EST MOD 30 MIN: CPT | Performed by: FAMILY MEDICINE

## 2018-06-14 PROCEDURE — G8599 NO ASA/ANTIPLAT THER USE RNG: HCPCS | Performed by: FAMILY MEDICINE

## 2018-06-14 PROCEDURE — G8427 DOCREV CUR MEDS BY ELIG CLIN: HCPCS | Performed by: FAMILY MEDICINE

## 2018-06-14 PROCEDURE — 3023F SPIROM DOC REV: CPT | Performed by: FAMILY MEDICINE

## 2018-06-14 PROCEDURE — G8926 SPIRO NO PERF OR DOC: HCPCS | Performed by: FAMILY MEDICINE

## 2018-06-14 RX ORDER — MIRTAZAPINE 30 MG/1
15-30 TABLET, FILM COATED ORAL NIGHTLY
Qty: 30 TABLET | Refills: 11 | Status: SHIPPED | OUTPATIENT
Start: 2018-06-14 | End: 2019-07-03 | Stop reason: SDUPTHER

## 2018-06-14 RX ORDER — GUAIFENESIN 600 MG/1
TABLET, EXTENDED RELEASE ORAL
Qty: 60 TABLET | Refills: 11 | Status: SHIPPED | OUTPATIENT
Start: 2018-06-14 | End: 2019-07-09 | Stop reason: SDUPTHER

## 2018-06-14 RX ORDER — CETIRIZINE HYDROCHLORIDE, PSEUDOEPHEDRINE HYDROCHLORIDE 5; 120 MG/1; MG/1
TABLET, FILM COATED, EXTENDED RELEASE ORAL
Qty: 60 TABLET | Refills: 11 | Status: SHIPPED | OUTPATIENT
Start: 2018-06-14 | End: 2019-06-25 | Stop reason: SDUPTHER

## 2018-06-14 RX ORDER — ALBUTEROL SULFATE 2.5 MG/3ML
SOLUTION RESPIRATORY (INHALATION)
Qty: 375 VIAL | Refills: 3 | Status: SHIPPED | OUTPATIENT
Start: 2018-06-14 | End: 2018-10-30 | Stop reason: ALTCHOICE

## 2018-06-14 ASSESSMENT — ENCOUNTER SYMPTOMS
WHEEZING: 1
GASTROINTESTINAL NEGATIVE: 1
COUGH: 1
SHORTNESS OF BREATH: 1
CHEST TIGHTNESS: 1

## 2018-06-20 ENCOUNTER — HOSPITAL ENCOUNTER (OUTPATIENT)
Dept: NON INVASIVE DIAGNOSTICS | Age: 58
Discharge: HOME | End: 2018-06-20
Payer: COMMERCIAL

## 2018-06-20 VITALS — WEIGHT: 155 LBS | HEIGHT: 65 IN | BODY MASS INDEX: 25.83 KG/M2

## 2018-06-20 DIAGNOSIS — I25.10 CORONARY ARTERY DISEASE INVOLVING NATIVE CORONARY ARTERY OF NATIVE HEART WITHOUT ANGINA PECTORIS: ICD-10-CM

## 2018-06-20 PROCEDURE — A9500 TC99M SESTAMIBI: HCPCS | Performed by: INTERNAL MEDICINE

## 2018-06-20 PROCEDURE — 93017 CV STRESS TEST TRACING ONLY: CPT | Performed by: INTERNAL MEDICINE

## 2018-06-20 PROCEDURE — 6360000002 HC RX W HCPCS

## 2018-06-20 PROCEDURE — 3430000000 HC RX DIAGNOSTIC RADIOPHARMACEUTICAL: Performed by: INTERNAL MEDICINE

## 2018-06-20 PROCEDURE — 78452 HT MUSCLE IMAGE SPECT MULT: CPT

## 2018-06-20 RX ADMIN — Medication 10.5 MILLICURIE: at 11:55

## 2018-06-20 RX ADMIN — Medication 33.5 MILLICURIE: at 12:40

## 2018-06-21 ENCOUNTER — OFFICE VISIT (OUTPATIENT)
Dept: CARDIOLOGY CLINIC | Age: 58
End: 2018-06-21
Payer: COMMERCIAL

## 2018-06-21 ENCOUNTER — TELEPHONE (OUTPATIENT)
Dept: CARDIOLOGY CLINIC | Age: 58
End: 2018-06-21

## 2018-06-21 VITALS
DIASTOLIC BLOOD PRESSURE: 62 MMHG | BODY MASS INDEX: 25.83 KG/M2 | WEIGHT: 155 LBS | SYSTOLIC BLOOD PRESSURE: 100 MMHG | HEIGHT: 65 IN

## 2018-06-21 DIAGNOSIS — R07.9 CHEST PAIN, UNSPECIFIED TYPE: Primary | ICD-10-CM

## 2018-06-21 PROCEDURE — 99214 OFFICE O/P EST MOD 30 MIN: CPT | Performed by: INTERNAL MEDICINE

## 2018-06-21 PROCEDURE — G8598 ASA/ANTIPLAT THER USED: HCPCS | Performed by: INTERNAL MEDICINE

## 2018-06-21 PROCEDURE — 4004F PT TOBACCO SCREEN RCVD TLK: CPT | Performed by: INTERNAL MEDICINE

## 2018-06-21 PROCEDURE — 3017F COLORECTAL CA SCREEN DOC REV: CPT | Performed by: INTERNAL MEDICINE

## 2018-06-21 PROCEDURE — G8417 CALC BMI ABV UP PARAM F/U: HCPCS | Performed by: INTERNAL MEDICINE

## 2018-06-21 PROCEDURE — G8427 DOCREV CUR MEDS BY ELIG CLIN: HCPCS | Performed by: INTERNAL MEDICINE

## 2018-06-21 RX ORDER — ASPIRIN 81 MG/1
81 TABLET ORAL DAILY
Qty: 30 TABLET | Refills: 3 | Status: SHIPPED | OUTPATIENT
Start: 2018-06-21 | End: 2018-08-15 | Stop reason: ALTCHOICE

## 2018-06-21 ASSESSMENT — ENCOUNTER SYMPTOMS
ABDOMINAL PAIN: 0
BLOATING: 0
BOWEL INCONTINENCE: 0
HEMOPTYSIS: 0
SPUTUM PRODUCTION: 0
BACK PAIN: 0
CONSTIPATION: 0
DIARRHEA: 0
ORTHOPNEA: 0
HOARSE VOICE: 0
EYE PAIN: 0
EYE DISCHARGE: 0
DOUBLE VISION: 0
COUGH: 0
CHANGE IN BOWEL HABIT: 0
BLURRED VISION: 0
SHORTNESS OF BREATH: 0

## 2018-06-25 ENCOUNTER — OFFICE VISIT (OUTPATIENT)
Dept: UROLOGY | Age: 58
End: 2018-06-25
Payer: COMMERCIAL

## 2018-06-25 VITALS
SYSTOLIC BLOOD PRESSURE: 106 MMHG | DIASTOLIC BLOOD PRESSURE: 82 MMHG | BODY MASS INDEX: 26.99 KG/M2 | HEIGHT: 65 IN | WEIGHT: 162 LBS

## 2018-06-25 DIAGNOSIS — N39.46 MIXED STRESS AND URGE URINARY INCONTINENCE: Primary | ICD-10-CM

## 2018-06-25 DIAGNOSIS — N81.10 FEMALE BLADDER PROLAPSE: ICD-10-CM

## 2018-06-25 LAB — POST VOID RESIDUAL (PVR): 0 ML

## 2018-06-25 PROCEDURE — 3017F COLORECTAL CA SCREEN DOC REV: CPT | Performed by: NURSE PRACTITIONER

## 2018-06-25 PROCEDURE — G8427 DOCREV CUR MEDS BY ELIG CLIN: HCPCS | Performed by: NURSE PRACTITIONER

## 2018-06-25 PROCEDURE — 51798 US URINE CAPACITY MEASURE: CPT | Performed by: NURSE PRACTITIONER

## 2018-06-25 PROCEDURE — 4004F PT TOBACCO SCREEN RCVD TLK: CPT | Performed by: NURSE PRACTITIONER

## 2018-06-25 PROCEDURE — G8598 ASA/ANTIPLAT THER USED: HCPCS | Performed by: NURSE PRACTITIONER

## 2018-06-25 PROCEDURE — 99203 OFFICE O/P NEW LOW 30 MIN: CPT | Performed by: NURSE PRACTITIONER

## 2018-06-25 PROCEDURE — G8417 CALC BMI ABV UP PARAM F/U: HCPCS | Performed by: NURSE PRACTITIONER

## 2018-07-05 VITALS — BODY MASS INDEX: 26.99 KG/M2 | HEIGHT: 65 IN | WEIGHT: 162 LBS

## 2018-07-05 NOTE — PROGRESS NOTES
NPO after midnight  Bring drivers license and insurance information  Wear comfortable clean clothes  Shower morning of and night before with liquid antibacterial soap  Remove jewelry   May have to stay overnight if have PTCA/stent  Bring medications in original bottles  Made aware of visitors limit to 1 at a time  Follow all instructions given by your physician   needed at discharge    PT USES CPAP AT NIGHT AND ADVISED  Karma Gardiner

## 2018-07-07 ENCOUNTER — HOSPITAL ENCOUNTER (EMERGENCY)
Age: 58
Discharge: HOME | End: 2018-07-07
Payer: COMMERCIAL

## 2018-07-07 ENCOUNTER — APPOINTMENT (OUTPATIENT)
Dept: GENERAL RADIOLOGY | Age: 58
End: 2018-07-07
Payer: COMMERCIAL

## 2018-07-07 VITALS
SYSTOLIC BLOOD PRESSURE: 111 MMHG | RESPIRATION RATE: 16 BRPM | DIASTOLIC BLOOD PRESSURE: 65 MMHG | HEART RATE: 106 BPM | TEMPERATURE: 98.3 F | OXYGEN SATURATION: 94 %

## 2018-07-07 DIAGNOSIS — S92.301A CLOSED NONDISPLACED FRACTURE OF METATARSAL BONE OF RIGHT FOOT, UNSPECIFIED METATARSAL, INITIAL ENCOUNTER: Primary | ICD-10-CM

## 2018-07-07 PROCEDURE — 99283 EMERGENCY DEPT VISIT LOW MDM: CPT

## 2018-07-07 PROCEDURE — 29515 APPLICATION SHORT LEG SPLINT: CPT

## 2018-07-07 PROCEDURE — 6370000000 HC RX 637 (ALT 250 FOR IP): Performed by: EMERGENCY MEDICINE

## 2018-07-07 PROCEDURE — 73630 X-RAY EXAM OF FOOT: CPT

## 2018-07-07 RX ORDER — HYDROCODONE BITARTRATE AND ACETAMINOPHEN 5; 325 MG/1; MG/1
1 TABLET ORAL ONCE
Status: COMPLETED | OUTPATIENT
Start: 2018-07-07 | End: 2018-07-07

## 2018-07-07 RX ORDER — HYDROCODONE BITARTRATE AND ACETAMINOPHEN 5; 325 MG/1; MG/1
1 TABLET ORAL EVERY 6 HOURS PRN
Qty: 8 TABLET | Refills: 0 | Status: SHIPPED | OUTPATIENT
Start: 2018-07-07 | End: 2018-07-09

## 2018-07-07 RX ADMIN — HYDROCODONE BITARTRATE AND ACETAMINOPHEN 1 TABLET: 5; 325 TABLET ORAL at 16:46

## 2018-07-07 ASSESSMENT — ENCOUNTER SYMPTOMS
WHEEZING: 0
ABDOMINAL PAIN: 0
RHINORRHEA: 0
SHORTNESS OF BREATH: 0
EYE PAIN: 0
NAUSEA: 0
COUGH: 0
VOMITING: 0
SORE THROAT: 0
DIARRHEA: 0
EYE DISCHARGE: 0
BACK PAIN: 0

## 2018-07-07 ASSESSMENT — PAIN SCALES - GENERAL
PAINLEVEL_OUTOF10: 10
PAINLEVEL_OUTOF10: 10

## 2018-07-07 ASSESSMENT — PAIN DESCRIPTION - ORIENTATION: ORIENTATION: RIGHT

## 2018-07-07 ASSESSMENT — PAIN DESCRIPTION - LOCATION: LOCATION: FOOT

## 2018-07-07 NOTE — ED PROVIDER NOTES
[facility]  eMERGENCY dEPARTMENT eNCOUnter          CHIEF COMPLAINT       Chief Complaint   Patient presents with    Foot Injury     right       Nurses Notes reviewed and I agree except as noted in the HPI. HISTORY OF PRESENT ILLNESS    Naomi Acosta is a 62 y.o. female who presents to the Emergency Department for the evaluation following a foot injury. The patient reports to have been walking down the back steps at her friend's house when she inverted her right foot and placed all of her weight onto the lateral aspect of the right foot. She states that upon re-rotating her foot that she heard a \"pop\". She rates her current pain as a 10/10 in severity. The patient reports no radiating pain. She reports no relieving or modifying factors. No additional symptoms or complaints at this time. The HPI was provided by the patient. REVIEW OF SYSTEMS     Review of Systems   Constitutional: Negative for appetite change, chills, fatigue and fever. HENT: Negative for congestion, ear pain, rhinorrhea and sore throat. Eyes: Negative for pain, discharge and visual disturbance. Respiratory: Negative for cough, shortness of breath and wheezing. Cardiovascular: Negative for chest pain, palpitations and leg swelling. Gastrointestinal: Negative for abdominal pain, diarrhea, nausea and vomiting. Genitourinary: Negative for difficulty urinating, dysuria, hematuria and vaginal discharge. Musculoskeletal: Positive for myalgias (right foot). Negative for arthralgias, back pain, joint swelling and neck pain. Skin: Negative for pallor and rash. Neurological: Negative for dizziness, syncope, weakness, light-headedness, numbness and headaches. Hematological: Negative for adenopathy. Psychiatric/Behavioral: Negative for confusion and suicidal ideas. The patient is not nervous/anxious. PAST MEDICAL HISTORY    has a past medical history of Acid reflux; Anxiety; Arthritis;  Asthma; Bipolar disorder ([facility] CAD (coronary artery disease); Cancer ([facility] [address] [facility] Chronic back pain; Chronic kidney disease; COPD (chronic obstructive pulmonary disease) ([facility] Depression; Emphysema of lung ([facility] Headache(784.0); Hearing deficit; Heart disease; HIV disease [facility] Hyperlipidemia; Immune deficiency disorder ([facility] Pleurisy; PTSD (post-traumatic stress disorder); Sleep apnea; Tobacco abuse; and UTI (lower urinary tract infection). SURGICAL HISTORY      has a past surgical history that includes Foot surgery (unsure); Hysterectomy (98); hernia repair (unsure); joint replacement (unsure); Colonoscopy (2014); Upper gastrointestinal endoscopy (2014); Cholecystectomy, laparoscopic (5/5/16); and Endoscopy, colon, diagnostic.     CURRENT MEDICATIONS       Discharge Medication List as of 7/7/2018  4:47 PM      CONTINUE these medications which have NOT CHANGED    Details   aspirin EC 81 MG EC tablet Take 1 tablet by mouth daily, Disp-30 tablet, R-3Normal      metoprolol tartrate (LOPRESSOR) 25 MG tablet Take 0.5 tablets by mouth 2 times daily, Disp-60 tablet, R-3Normal      mirtazapine (REMERON) 30 MG tablet Take 0.5-1 tablets by mouth nightly As needed, Disp-30 tablet, R-11Normal      guaiFENesin (MUCINEX) 600 MG extended release tablet TAKE ONE (1) TABLET BY MOUTH TWICE DAILY, Disp-60 tablet, R-11Normal      cetirizine-psuedoephedrine (RA CETIRI-D) 5-120 MG per extended release tablet take 1 tablet by mouth twice a day, Disp-60 tablet, R-11Normal      albuterol (PROVENTIL) (2.5 MG/3ML) 0.083% nebulizer solution inhale contents of 1 vial in nebulizer every 6 hours if needed, Disp-375 vial, R-3Normal      !! albuterol sulfate HFA (VENTOLIN HFA) 108 (90 Base) MCG/ACT inhaler Inhale 2 puffs into the lungs every 4 hours as needed for Wheezing, Disp-1 Inhaler, R-3Normal      benzonatate (TESSALON) 100 MG capsule TAKE ONE (1) CAPSULE BY MOUTH THREE TIMES A DAY FOR COUGH, Disp-30 capsule, R-10Normal      !! albuterol sulfate HFA Dr. Jose E Henry (podiatry) who recommended that patient be placed in a short leg splint and be f/u in the office at 8 am on 7-9-18. MDM:  The patient was seen and evaluated within the ED today for the evaluation of a right foot injury. The patient presented in minimal acute distress. Physical exam revealed right foot tenderness, bruising, and swelling to the lateral distal aspect. Right foot XR reveals an oblique fracture to the fifth metatarsal. The patient was treated with [place] while in the ED. I observed the patient's condition to improve during the duration of their stay. The patient stated that NSAID medication does not improve her pain. She was amenable to my decision for discharge and was sent home in stable condition with Norco (325 mg per tablet, instructed to take every 6 hours as needed), crutches, and a short leg splint. I discussed return precautions and she verbalized understanding. I recommended that she f/u with her pcp if her symptoms worsen. I recommended that she make it to her scheduled appointment on 7-9-18 at 8 am with Dr. Jose E Henry (podiatry). All questions and concerns addressed. CRITICAL CARE:   None     CONSULTS:  Dr. Jose E Henry (podiatry)     PROCEDURES:  ED PROCEDURE NOTE: SPLINTING/STRAPPING    The patient's right foot was placed in a distal short leg splint. The area was examined post application and there was good alignment and good neurovascular function of the splinted/immobilized body part following the procedure. The patient tolerated the procedure well. Electronically verified by MARTY Welch. FINAL IMPRESSION      1.  Closed nondisplaced fracture of metatarsal bone of right foot, unspecified metatarsal, initial encounter          DISPOSITION/PLAN   Discharged    PATIENT REFERRED TO:  Elaine Pederson DPM  [address] [facility]. [phone]    On 7/9/2018  appointment at 0800      DISCHARGE MEDICATIONS:  Discharge Medication List as of 7/7/2018  4:47 PM

## 2018-07-07 NOTE — ED NOTES
Pt arrives with complaints of right foot injury. States she was on uneven ground and felt her foot crack. States her pain is beyond a 10. X ray imaging ordered.       Beth Zhao LPN  72/18/73 0289

## 2018-07-09 ENCOUNTER — PREP FOR PROCEDURE (OUTPATIENT)
Dept: CARDIOLOGY | Age: 58
End: 2018-07-09

## 2018-07-09 RX ORDER — SODIUM CHLORIDE 0.9 % (FLUSH) 0.9 %
10 SYRINGE (ML) INJECTION EVERY 12 HOURS SCHEDULED
Status: CANCELLED | OUTPATIENT
Start: 2018-07-09 | End: 2019-07-09

## 2018-07-09 RX ORDER — SODIUM CHLORIDE 0.9 % (FLUSH) 0.9 %
10 SYRINGE (ML) INJECTION PRN
Status: CANCELLED | OUTPATIENT
Start: 2018-07-09 | End: 2019-07-09

## 2018-07-09 RX ORDER — NITROGLYCERIN 0.4 MG/1
0.4 TABLET SUBLINGUAL EVERY 5 MIN PRN
Status: CANCELLED | OUTPATIENT
Start: 2018-07-09 | End: 2018-07-12

## 2018-07-09 RX ORDER — SODIUM CHLORIDE 9 MG/ML
INJECTION, SOLUTION INTRAVENOUS CONTINUOUS
Status: CANCELLED | OUTPATIENT
Start: 2018-07-09 | End: 2019-07-09

## 2018-07-10 ENCOUNTER — HOSPITAL ENCOUNTER (OUTPATIENT)
Dept: INPATIENT UNIT | Age: 58
Discharge: HOME | End: 2018-07-10

## 2018-07-11 ENCOUNTER — CARE COORDINATION (OUTPATIENT)
Dept: CARE COORDINATION | Age: 58
End: 2018-07-11

## 2018-07-11 DIAGNOSIS — J44.9 CHRONIC OBSTRUCTIVE PULMONARY DISEASE, UNSPECIFIED COPD TYPE (HCC): ICD-10-CM

## 2018-07-11 DIAGNOSIS — I25.10 CORONARY ARTERY DISEASE INVOLVING NATIVE CORONARY ARTERY OF NATIVE HEART WITHOUT ANGINA PECTORIS: Primary | ICD-10-CM

## 2018-07-11 DIAGNOSIS — R53.1 GENERAL WEAKNESS: ICD-10-CM

## 2018-07-11 DIAGNOSIS — B20 HIV (HUMAN IMMUNODEFICIENCY VIRUS INFECTION) (HCC): ICD-10-CM

## 2018-07-11 DIAGNOSIS — S92.301A CLOSED NONDISPLACED FRACTURE OF METATARSAL BONE OF RIGHT FOOT, INITIAL ENCOUNTER: ICD-10-CM

## 2018-07-11 ASSESSMENT — ENCOUNTER SYMPTOMS: DYSPNEA ASSOCIATED WITH: EXERTION

## 2018-07-11 NOTE — CARE COORDINATION
Ambulatory Care Coordination Note  7/11/2018  CM Risk Score: 6  [place] Mortality Risk Score:      ACC: Elijah Jordan, RN    Summary Note: spoke with Meghann Jackson today. She states that she is doing ok. Was seen ED on 7/7 for foot pain. Found to have fx in foot. Pt reports that it occurred d/t walking on uneven concrete causing her to step wrong. Following with Dr. Calixto Valdes. In boot. Pt states that she was told she will be in boot for 8wks. Pt still has not received power cord for power chair. States that d/t her decreased mobility now with her foot fx she could really use her power chair. Informed her that I would f/u with her CM Bashir Jean-Baptiste at [address] [place] [address]. Pt is receiving [facility] nurse 1x week that is setting her meds up. Now has pill box. States that she needs [facility] aide, but was told her insurance would not cover it through that agency. Informed her that I would check in to it. Has not received incontinence supplies that were ordered last mth. Informed her that I would f/u with [place].  Appears wrong address is on DWO form that was sent to office. Discussed COPD zone mgmt. Pt reports that she did not receive tool sheet that was mailed. New zone mgmt sheet mailed to pt. Discussed smoking. Pt continues to smoke 1/2 ppd. Has no intentions of quitting. Resistant to education regarding cessation. Discussed negative effect that it has on her COPD. Pt became very defensive stating that her COPD is from gases expelled from [facility] when living in [place]. Pt does understand that smoking is not helping her COPD in any way. Pt reports that breathing is at baseline. States that she is coughing up yellow sputum. Denies fever. Denies increased congestion. Scheduled for cardiac cath on 7/24. Has appt with urology on 7/26. Encouraged to call with any questions or concerns.     COPD Assessment    Does the patient understand envrionmental exposure?:  Yes  Is the patient able to verbalize Rescue vs. Long Acting medications?:

## 2018-07-11 NOTE — CARE COORDINATION
Spoke with [place] today. D/t recent fx in her foot she has reduced mobility. She is requesting [facility] aide be added to her plan of care. Contacted Nissa at [facility]. States that initially she did not have qualifying needs for [facility] aide, but may qualify now d/t her reduced mobility. Will need order requesting [facility] aide 2x per week d/t recent fx in foot causing reduced mobility. Order will need faxed to [phone]. Please advise.

## 2018-07-12 ENCOUNTER — TELEPHONE (OUTPATIENT)
Dept: FAMILY MEDICINE CLINIC | Age: 58
End: 2018-07-12

## 2018-07-12 ENCOUNTER — CARE COORDINATION (OUTPATIENT)
Dept: CARE COORDINATION | Age: 58
End: 2018-07-12

## 2018-07-12 DIAGNOSIS — R53.1 GENERAL WEAKNESS: ICD-10-CM

## 2018-07-12 DIAGNOSIS — D84.9 IMMUNOCOMPROMISED STATE (HCC): Primary | Chronic | ICD-10-CM

## 2018-07-12 NOTE — CARE COORDINATION
Received email from Chantal Llanes RN with [facility]. She states that they received [facility] aide order yesterday, but [facility] will only cover [facility] aide for pt's needing hands on personal care only and since Avi Reece does not require this insurance will not cover this service. She states that she did speak with her nurse Judd Mueller and informed her that if Kaylyn's main need is for homemaking services d/t her decreased mobility that she can make a referral to [employer]. [facility] nurse is going to discuss this with Avi Reece and if agreeable they will make referral at that time. Loyalhannaonelia Swain states that a big need [facility] is food. She states that they have provided her with food on multiple different occasions. Discussed with her if pt utilizes food dwyer. She does, but unfortunately since her power chair is not functioning she is unable to go and get the food and bring it back. Informed Eren Swain that I have been working with CM through [employer] to get her a power cord for her power chair. Informed her that I reached out to her yesterday, but have not heard back yet. Will continue to f/u.

## 2018-07-16 NOTE — PROGRESS NOTES
Pt's procedure date changed. I called her to verify date change and arrival time. She acknowledged awareness of the change and stated she recently had broken her foot which was the cause for the delay. She said she remembers what to do for cath from previous call on 7-5-18.

## 2018-07-23 ENCOUNTER — PREP FOR PROCEDURE (OUTPATIENT)
Dept: CARDIOLOGY | Age: 58
End: 2018-07-23

## 2018-07-23 RX ORDER — SODIUM CHLORIDE 9 MG/ML
INJECTION, SOLUTION INTRAVENOUS CONTINUOUS
Status: CANCELLED | OUTPATIENT
Start: 2018-07-23 | End: 2019-07-23

## 2018-07-23 RX ORDER — ASPIRIN 325 MG
325 TABLET ORAL ONCE
Status: CANCELLED | OUTPATIENT
Start: 2018-07-23 | End: 2018-07-23

## 2018-07-23 RX ORDER — SODIUM CHLORIDE 0.9 % (FLUSH) 0.9 %
10 SYRINGE (ML) INJECTION EVERY 12 HOURS SCHEDULED
Status: CANCELLED | OUTPATIENT
Start: 2018-07-23 | End: 2019-07-23

## 2018-07-23 RX ORDER — SODIUM CHLORIDE 0.9 % (FLUSH) 0.9 %
10 SYRINGE (ML) INJECTION PRN
Status: CANCELLED | OUTPATIENT
Start: 2018-07-23 | End: 2019-07-23

## 2018-07-23 RX ORDER — DIPHENHYDRAMINE HYDROCHLORIDE 50 MG/ML
50 INJECTION INTRAMUSCULAR; INTRAVENOUS ONCE
Status: CANCELLED | OUTPATIENT
Start: 2018-07-23 | End: 2018-07-23

## 2018-07-24 ENCOUNTER — APPOINTMENT (OUTPATIENT)
Dept: CARDIAC CATH/INVASIVE PROCEDURES | Age: 58
End: 2018-07-24
Payer: COMMERCIAL

## 2018-07-24 ENCOUNTER — HOSPITAL ENCOUNTER (OUTPATIENT)
Dept: INPATIENT UNIT | Age: 58
Discharge: HOME | End: 2018-07-24
Attending: INTERNAL MEDICINE | Admitting: INTERNAL MEDICINE
Payer: COMMERCIAL

## 2018-07-24 VITALS
HEIGHT: 65 IN | RESPIRATION RATE: 18 BRPM | WEIGHT: 162 LBS | TEMPERATURE: 97.5 F | HEART RATE: 85 BPM | DIASTOLIC BLOOD PRESSURE: 47 MMHG | SYSTOLIC BLOOD PRESSURE: 97 MMHG | BODY MASS INDEX: 26.99 KG/M2 | OXYGEN SATURATION: 97 %

## 2018-07-24 LAB
ABO: NORMAL
ANION GAP SERPL CALCULATED.3IONS-SCNC: 13 MEQ/L (ref 8–16)
ANTIBODY SCREEN: NORMAL
APTT: 26.4 SECONDS (ref 22–38)
BUN BLDV-MCNC: 16 MG/DL (ref 7–22)
CALCIUM SERPL-MCNC: 9.4 MG/DL (ref 8.5–10.5)
CHLORIDE BLD-SCNC: 100 MEQ/L (ref 98–111)
CO2: 24 MEQ/L (ref 23–33)
CREAT SERPL-MCNC: 0.7 MG/DL (ref 0.4–1.2)
EKG ATRIAL RATE: 82 BPM
EKG P AXIS: 81 DEGREES
EKG P-R INTERVAL: 156 MS
EKG Q-T INTERVAL: 416 MS
EKG QRS DURATION: 98 MS
EKG QTC CALCULATION (BAZETT): 486 MS
EKG R AXIS: 67 DEGREES
EKG T AXIS: 66 DEGREES
EKG VENTRICULAR RATE: 82 BPM
ERYTHROCYTE [DISTWIDTH] IN BLOOD BY AUTOMATED COUNT: 13.8 % (ref 11.5–14.5)
ERYTHROCYTE [DISTWIDTH] IN BLOOD BY AUTOMATED COUNT: 47.6 FL (ref 35–45)
GFR SERPL CREATININE-BSD FRML MDRD: 86 ML/MIN/1.73M2
GLUCOSE BLD-MCNC: 86 MG/DL (ref 70–108)
HCT VFR BLD CALC: 41.3 % (ref 37–47)
HEMOGLOBIN: 13.8 GM/DL (ref 12–16)
INR BLD: 0.86 (ref 0.85–1.13)
MCH RBC QN AUTO: 31.2 PG (ref 26–33)
MCHC RBC AUTO-ENTMCNC: 33.4 GM/DL (ref 32.2–35.5)
MCV RBC AUTO: 93.2 FL (ref 81–99)
PLATELET # BLD: 277 THOU/MM3 (ref 130–400)
PMV BLD AUTO: 9.4 FL (ref 9.4–12.4)
POTASSIUM REFLEX MAGNESIUM: 4.4 MEQ/L (ref 3.5–5.2)
RBC # BLD: 4.43 MILL/MM3 (ref 4.2–5.4)
RH FACTOR: NORMAL
SODIUM BLD-SCNC: 137 MEQ/L (ref 135–145)
WBC # BLD: 6.1 THOU/MM3 (ref 4.8–10.8)

## 2018-07-24 PROCEDURE — 2709999900 HC NON-CHARGEABLE SUPPLY

## 2018-07-24 PROCEDURE — 86901 BLOOD TYPING SEROLOGIC RH(D): CPT

## 2018-07-24 PROCEDURE — 86850 RBC ANTIBODY SCREEN: CPT

## 2018-07-24 PROCEDURE — 93005 ELECTROCARDIOGRAM TRACING: CPT | Performed by: NURSE PRACTITIONER

## 2018-07-24 PROCEDURE — 93458 L HRT ARTERY/VENTRICLE ANGIO: CPT

## 2018-07-24 PROCEDURE — 6360000002 HC RX W HCPCS

## 2018-07-24 PROCEDURE — 93458 L HRT ARTERY/VENTRICLE ANGIO: CPT | Performed by: INTERNAL MEDICINE

## 2018-07-24 PROCEDURE — 85027 COMPLETE CBC AUTOMATED: CPT

## 2018-07-24 PROCEDURE — C1894 INTRO/SHEATH, NON-LASER: HCPCS

## 2018-07-24 PROCEDURE — 85610 PROTHROMBIN TIME: CPT

## 2018-07-24 PROCEDURE — 2580000003 HC RX 258: Performed by: NURSE PRACTITIONER

## 2018-07-24 PROCEDURE — 86900 BLOOD TYPING SEROLOGIC ABO: CPT

## 2018-07-24 PROCEDURE — 85730 THROMBOPLASTIN TIME PARTIAL: CPT

## 2018-07-24 PROCEDURE — 80048 BASIC METABOLIC PNL TOTAL CA: CPT

## 2018-07-24 PROCEDURE — C1769 GUIDE WIRE: HCPCS

## 2018-07-24 PROCEDURE — 2500000003 HC RX 250 WO HCPCS

## 2018-07-24 PROCEDURE — 36415 COLL VENOUS BLD VENIPUNCTURE: CPT

## 2018-07-24 RX ORDER — SODIUM CHLORIDE 0.9 % (FLUSH) 0.9 %
10 SYRINGE (ML) INJECTION PRN
Status: DISCONTINUED | OUTPATIENT
Start: 2018-07-24 | End: 2018-07-24 | Stop reason: SDUPTHER

## 2018-07-24 RX ORDER — SODIUM CHLORIDE 9 MG/ML
75 INJECTION, SOLUTION INTRAVENOUS CONTINUOUS
Status: DISCONTINUED | OUTPATIENT
Start: 2018-07-24 | End: 2018-07-24 | Stop reason: HOSPADM

## 2018-07-24 RX ORDER — SODIUM CHLORIDE 0.9 % (FLUSH) 0.9 %
10 SYRINGE (ML) INJECTION PRN
Status: DISCONTINUED | OUTPATIENT
Start: 2018-07-24 | End: 2018-07-24 | Stop reason: HOSPADM

## 2018-07-24 RX ORDER — SODIUM CHLORIDE 0.9 % (FLUSH) 0.9 %
10 SYRINGE (ML) INJECTION EVERY 12 HOURS SCHEDULED
Status: DISCONTINUED | OUTPATIENT
Start: 2018-07-24 | End: 2018-07-24 | Stop reason: SDUPTHER

## 2018-07-24 RX ORDER — NICOTINE 21 MG/24HR
1 PATCH, TRANSDERMAL 24 HOURS TRANSDERMAL EVERY 24 HOURS
COMMUNITY
End: 2019-02-18

## 2018-07-24 RX ORDER — DIPHENHYDRAMINE HYDROCHLORIDE 50 MG/ML
50 INJECTION INTRAMUSCULAR; INTRAVENOUS ONCE
Status: DISCONTINUED | OUTPATIENT
Start: 2018-07-24 | End: 2018-07-24 | Stop reason: HOSPADM

## 2018-07-24 RX ORDER — ACETAMINOPHEN 325 MG/1
650 TABLET ORAL EVERY 4 HOURS PRN
Status: DISCONTINUED | OUTPATIENT
Start: 2018-07-24 | End: 2018-07-24 | Stop reason: HOSPADM

## 2018-07-24 RX ORDER — ASPIRIN 325 MG
325 TABLET ORAL ONCE
Status: DISCONTINUED | OUTPATIENT
Start: 2018-07-24 | End: 2018-07-24 | Stop reason: HOSPADM

## 2018-07-24 RX ORDER — SODIUM CHLORIDE 0.9 % (FLUSH) 0.9 %
10 SYRINGE (ML) INJECTION EVERY 12 HOURS SCHEDULED
Status: DISCONTINUED | OUTPATIENT
Start: 2018-07-24 | End: 2018-07-24 | Stop reason: HOSPADM

## 2018-07-24 RX ORDER — SODIUM CHLORIDE 9 MG/ML
INJECTION, SOLUTION INTRAVENOUS CONTINUOUS
Status: DISCONTINUED | OUTPATIENT
Start: 2018-07-24 | End: 2018-07-24 | Stop reason: SDUPTHER

## 2018-07-24 RX ORDER — ONDANSETRON 2 MG/ML
4 INJECTION INTRAMUSCULAR; INTRAVENOUS EVERY 6 HOURS PRN
Status: DISCONTINUED | OUTPATIENT
Start: 2018-07-24 | End: 2018-07-24 | Stop reason: HOSPADM

## 2018-07-24 RX ADMIN — SODIUM CHLORIDE: 9 INJECTION, SOLUTION INTRAVENOUS at 12:29

## 2018-07-24 ASSESSMENT — PAIN SCALES - GENERAL: PAINLEVEL_OUTOF10: 0

## 2018-07-24 NOTE — PROGRESS NOTES
[facility]  Sedation/Analgesia History & Physical      Pt Name: Uziel Powers  MRN: [id number]  YOB: 1960  Provider Performing Procedure: Diana Devine [place], [facility]  Primary Care Physician: Marisol Vasquez, DO      PRE-PROCEDURE   DNR-CCA/DNR-CC []Yes [x]No      PLANNED PROCEDURE     [x]Cath  []PCI                []Pacemaker/AICD  []ABE             []Cardioversion []Peripheral angiography/PTA  []Other:        Consent:   The indication, risks and benefits of the procedure and possible therapeutic consequences and alternatives were discussed with the patient. The patient was given the opportunity to ask questions and to have them answered to his/her satisfaction. Risks of the procedure include but are not limited to the following: Bleeding, hematoma including retroperitoneal hematoma, infection, pain and discomfort, injury to the aorta and other blood vessels, rhythm disturbance, low blood pressure, myocardial infarction, stroke, kidney damage/failure, myocardial perforation, allergic reactions to sedatives/contrast material, loss of pulse/vascular compromise requiring surgery, aneurysm/pseudoaneurysm formation, possible loss of a limb/hand/leg, death. Alternatives to and omission of the suggested procedure were discussed. The patient had no further questions and wished to proceed; the consent form was signed.       Indications for the Procedure:     CAD Presentation:  New Onset Angina <= 2 months    Anginal Classification within 2 weeks:  CCS III - Symptoms with everyday living activities, i.e., moderate limitation    NYHA Heart Failure Class within 2 weeks: No symptoms      Anti- Anginal Meds within 2 weeks:   ANTI-ANGINAL MEDS: Yes: Beta Blockers, Aspirin and Statin (Any)    Stress or Imaging Studies Performed:  Stress Test with SPECT Result: Positive:  inferiorapica Risk/Extent of Ischemia:  Intermediate    CABG:no            MEDICAL HISTORY        Past Medical History:   Diagnosis m)   Wt 162 lb (73.5 kg)   SpO2 96%   BMI 26.96 kg/m²     Heart:  [x]Regular rate and rhythm  []Other:    Lungs:  [x]Clear    []Other:    Abdomen: [x]Soft    []Other:    Mental Status: [x]Alert & Oriented  []Other:   Ext:                [x]No edema       []Other:         No results for input(s): CKTOTAL, CKMB, CKMBINDEX, TROPONINI in the last 72 hours. Lab Results   Component Value Date    WBC 6.1 07/24/2018    RBC 4.43 07/24/2018    RBC 4.48 06/04/2012    HGB 13.8 07/24/2018    HCT 41.3 07/24/2018    MCV 93.2 07/24/2018    MCH 31.2 07/24/2018    MCHC 33.4 07/24/2018    RDW 14.9 06/12/2018     07/24/2018    MPV 9.4 07/24/2018       Lab Results   Component Value Date     07/24/2018    K 4.4 07/24/2018     07/24/2018    CO2 24 07/24/2018    BUN 16 07/24/2018    LABALBU 4.1 06/12/2018    LABALBU 4.4 06/04/2012    CREATININE 0.7 07/24/2018    CALCIUM 9.4 07/24/2018    LABGLOM 86 07/24/2018    GLUCOSE 86 07/24/2018    GLUCOSE 100 06/04/2012       Lab Results   Component Value Date    ALKPHOS 179 06/12/2018    ALT 23 06/12/2018    AST 32 06/12/2018    PROT 7.7 06/12/2018    BILITOT <0.2 06/12/2018    BILIDIR <0.2 04/27/2016    LABALBU 4.1 06/12/2018    LABALBU 4.4 06/04/2012       Lab Results   Component Value Date    MG 1.9 08/14/2015       No components found for: Sheila More    Lab Results   Component Value Date    LABA1C 5.3 05/14/2018       Lab Results   Component Value Date    TRIG 311 05/14/2018    HDL 44 05/14/2018    LDLCALC 92 05/14/2018       Lab Results   Component Value Date    TSH 1.110 06/14/2017            SEDATION  Planned agent:[x]Midazolam []Meperidine [x]Sublimaze []Morphine []Diazepam  []Other:         ASA Classification:  []1 [x]2 []3 []4 []5  Class 1: A normal healthy patient  Class 2: Pt with mild to moderate systemic disease  Class 3: Severe systemic disease or disturbance  Class 4: Severe systemic disorders that are already life threatening.   Class 5: Moribund pt

## 2018-07-24 NOTE — PLAN OF CARE
Problem: Preoperative Routine:  Goal: Risk for injury before, during, or after surgery or procedure will decrease  Risk for injury before, during, or after surgery or procedure will decrease   Outcome: Completed Date Met: 07/24/18  Procedure tolerated well    Problem: Discharge Planning:  Goal: Discharged to appropriate level of care  Discharged to appropriate level of care   Outcome: Completed Date Met: 07/24/18  Discharged home via [facility]     Problem: Falls - Risk of:  Goal: Will remain free from falls  Will remain free from falls   Outcome: Completed Date Met: 07/24/18  Fall free  Goal: Absence of physical injury  Absence of physical injury   Outcome: Completed Date Met: 07/24/18

## 2018-07-24 NOTE — PROGRESS NOTES
Patient admitted to 2E16  Ambulatory for cardiac cath. Patient NPO. Patient accompanied by family. Vital signs obtained. Assessment and data collection intiated. Oriented to room. Policies and procedures for 2E explained. All questions answered with no further questions at this time. Fall prevention and safety precautions discussed with patient.

## 2018-07-26 RX ORDER — SODIUM CHLORIDE 9 MG/ML
INJECTION, SOLUTION INTRAVENOUS CONTINUOUS
Status: ACTIVE | OUTPATIENT
Start: 2018-07-26 | End: 2019-07-26

## 2018-07-26 RX ORDER — SODIUM CHLORIDE 0.9 % (FLUSH) 0.9 %
10 SYRINGE (ML) INJECTION PRN
Status: DISCONTINUED | OUTPATIENT
Start: 2018-07-26 | End: 2018-11-14

## 2018-07-26 RX ORDER — NITROGLYCERIN 0.4 MG/1
0.4 TABLET SUBLINGUAL EVERY 5 MIN PRN
Status: ACTIVE | OUTPATIENT
Start: 2018-07-26 | End: 2018-07-29

## 2018-07-26 RX ORDER — SODIUM CHLORIDE 0.9 % (FLUSH) 0.9 %
10 SYRINGE (ML) INJECTION EVERY 12 HOURS SCHEDULED
Status: DISCONTINUED | OUTPATIENT
Start: 2018-07-26 | End: 2018-11-14

## 2018-08-06 ENCOUNTER — CARE COORDINATION (OUTPATIENT)
Dept: CARE COORDINATION | Age: 58
End: 2018-08-06

## 2018-08-06 ASSESSMENT — ENCOUNTER SYMPTOMS: DYSPNEA ASSOCIATED WITH: EXERTION

## 2018-08-06 NOTE — CARE COORDINATION
Received call back from Kym Salinas. She states that back on July 12th rep from their office contacted PCP office requesting script for power chair be faxed to [facility].  Upon review she states that [address] [place] [address] received order for lift chair instead of power chair. Informed her that I will send clarification note to PCP for power chair order. Spoke with Florinda. Informed her of this. Verbalizes understanding.

## 2018-08-06 NOTE — CARE COORDINATION
Avi Shanell is eligible for new power chair. Spoke with СВЕТЛАНА Ruiz at [facility]. She is requesting script for power chair. please fax script to [facility] at [phone]. Please advise.

## 2018-08-06 NOTE — CARE COORDINATION
medications?:  Yes  Change in chronic cough?:  Increased  Change in sputum?:  Increased  Sputum characteristics:  Yellow  Self Monitoring - SaO2:  No  Have you had a recent diagnosis of pneumonia either by PCP or at a hospital?:  No               Care Coordination Interventions    Program Enrollment:  Complex Care  Referral from Primary Care Provider:  No  Suggested Interventions and [address]:  Completed (Comment: [facility]. nurse 1x wk)  Meals on Wheels:  Not Started (Comment: contact number for [employer] provided to pt)  Medi Set or Pill Pack:  Declined (Comment: pt does not use medi set. states that she needs to get one.  tried [facility] exact paks, but did not like)  Other Services:  (Comment: has DEYANIRA Morris with [facility])  Transportation Support:  Completed  Zone Management Tools: In Process (Comment: reviewed and mailed COPD zone mgmt tool sheet. 7/11 pt reports that she did not receive zone mgmt. in mail. new COPD zone mgmt tool sheet mailed to pt. )  Other Services or Interventions:  CHW through [place], [address] with [employer]         Goals Addressed             Most Recent     Conditions and Symptoms   Worsening (8/6/2018)             I will schedule office visits, as directed by my provider. I will keep my appointment or reschedule if I have to cancel. I will notify my provider of any barriers to my plan of care. I will follow my Zone Management tool to seek urgent or emergent care. I will notify my provider of any symptoms that indicate a worsening of my condition.     Barriers: lack of support and overwhelmed by complexity of regimen  Plan for overcoming my barriers: support from Care Coordinator, DEYANIRA with [employer], and [employer]  Confidence: 6/10  Anticipated Goal Completion Date: ongoing         Stop Cigarette/Tobacco use   Not on track (8/6/2018)          Prior to Admission medications    Medication Sig Start Date End Date Taking?  Authorizing Provider   nicotine (NICODERM CQ) 21 MG/24HR Place 1 patch onto the skin every 24 hours    Historical Provider, MD   Lift Chair [address] [facility] by Does not apply route 7/12/18   Natanael Amaya DO   aspirin EC 81 MG EC tablet Take 1 tablet by mouth daily 6/21/18   Cindy Mcbride MD   metoprolol tartrate (LOPRESSOR) 25 MG tablet Take 0.5 tablets by mouth 2 times daily 6/21/18   Cindy Mcbride MD   mirtazapine (REMERON) 30 MG tablet Take 0.5-1 tablets by mouth nightly As needed 6/14/18   Natanael Amaya DO   guaiFENesin (MUCINEX) 600 MG extended release tablet TAKE ONE (1) TABLET BY MOUTH TWICE DAILY 6/14/18   Natanael Amaya, DO   cetirizine-psuedoephedrine (RA CETIRI-D) 5-120 MG per extended release tablet take 1 tablet by mouth twice a day 6/14/18   Natanael Amaya DO   albuterol (PROVENTIL) (2.5 MG/3ML) 0.083% nebulizer solution inhale contents of 1 vial in nebulizer every 6 hours if needed 6/14/18   Natanael Amaya DO   albuterol sulfate HFA (VENTOLIN HFA) 108 (90 Base) MCG/ACT inhaler Inhale 2 puffs into the lungs every 4 hours as needed for Wheezing 4/30/18   Natanael Amaya DO   benzonatate (TESSALON) 100 MG capsule TAKE ONE (1) CAPSULE BY MOUTH THREE TIMES A DAY FOR COUGH 8/8/17   Natanael Amaya DO   ATRIPLA 600-200-300 MG per tablet TAKE 1 TABLET BY MOUTH NIGHTLY 6/27/17   Natanael Amaya DO   PARoxetine (PAXIL) 30 MG tablet Take 30 mg by mouth daily    Historical Provider, MD   risperiDONE (RISPERDAL) 3 MG tablet Take 3 mg by mouth 2 times daily    Historical Provider, MD   OXYGEN Inhale 2 L into the lungs nightly    Historical Provider, MD PEREZ PRESSAIR 400 MCG/ACT AEPB inhaler inhale 1 puff INTO THE LUNGS twice a day 5/10/16   Kushal Martinez MD   omeprazole (PRILOSEC) 40 MG capsule Take 1 capsule by mouth 2 times daily 6/23/15   Aubrey Delacruz MD       Future Appointments  Date Time Provider Bertin Hoffman   8/13/2018 11:15 AM Danielle Rodriguez David Domínguez, [address] [facility]   8/15/2018 11:15 AM Daniel Melchor DO [address] [facility]   9/17/2018 2:20 PM Tiffany Lundberg MD Pulm Med [facility]   1/3/2019 2:15 PM Asuncion Low MD [address] [facility]       Attempted to reach Chinyere Powell CM with [employer] to f/u with power cord for power chair. No answer. Message left to return call. Attempted to reach СВЕТЛАНА Stoddard with [employer]. No answer. Message left to return call.

## 2018-08-08 ENCOUNTER — TELEPHONE (OUTPATIENT)
Dept: FAMILY MEDICINE CLINIC | Age: 58
End: 2018-08-08

## 2018-08-08 NOTE — TELEPHONE ENCOUNTER
Patient called asking if you will do a letter patient resides at [facility] [address] is the handicap [address] she's wanting. Asking to fax letter to [phone]. Patient would like a call back with advice.

## 2018-08-09 ENCOUNTER — TELEPHONE (OUTPATIENT)
Dept: FAMILY MEDICINE CLINIC | Age: 58
End: 2018-08-09

## 2018-08-09 NOTE — TELEPHONE ENCOUNTER
Yes, need [address] handicap accessible due to patient dx of __ and having O2 tanks,equipment and power chair.

## 2018-08-09 NOTE — TELEPHONE ENCOUNTER
Please clarify. What needs to be in the letter? Pt needs [address] due to it being handicap accessible?

## 2018-08-10 ENCOUNTER — APPOINTMENT (OUTPATIENT)
Dept: GENERAL RADIOLOGY | Age: 58
End: 2018-08-10
Payer: COMMERCIAL

## 2018-08-10 ENCOUNTER — HOSPITAL ENCOUNTER (EMERGENCY)
Age: 58
Discharge: HOME | End: 2018-08-10
Payer: COMMERCIAL

## 2018-08-10 VITALS
DIASTOLIC BLOOD PRESSURE: 75 MMHG | OXYGEN SATURATION: 95 % | SYSTOLIC BLOOD PRESSURE: 116 MMHG | HEIGHT: 65 IN | HEART RATE: 79 BPM | BODY MASS INDEX: 26.99 KG/M2 | TEMPERATURE: 98 F | WEIGHT: 162 LBS | RESPIRATION RATE: 31 BRPM

## 2018-08-10 DIAGNOSIS — J44.1 COPD EXACERBATION (HCC): Primary | ICD-10-CM

## 2018-08-10 LAB
ALBUMIN SERPL-MCNC: 4 G/DL (ref 3.5–5.1)
ALP BLD-CCNC: 188 U/L (ref 38–126)
ALT SERPL-CCNC: 17 U/L (ref 11–66)
ANION GAP SERPL CALCULATED.3IONS-SCNC: 13 MEQ/L (ref 8–16)
AST SERPL-CCNC: 20 U/L (ref 5–40)
BASOPHILS # BLD: 1.2 %
BASOPHILS ABSOLUTE: 0.1 THOU/MM3 (ref 0–0.1)
BILIRUB SERPL-MCNC: < 0.2 MG/DL (ref 0.3–1.2)
BILIRUBIN DIRECT: < 0.2 MG/DL (ref 0–0.3)
BUN BLDV-MCNC: 16 MG/DL (ref 7–22)
CALCIUM SERPL-MCNC: 9.3 MG/DL (ref 8.5–10.5)
CHLORIDE BLD-SCNC: 101 MEQ/L (ref 98–111)
CO2: 26 MEQ/L (ref 23–33)
CREAT SERPL-MCNC: 0.8 MG/DL (ref 0.4–1.2)
EKG ATRIAL RATE: 85 BPM
EKG P AXIS: 77 DEGREES
EKG P-R INTERVAL: 152 MS
EKG Q-T INTERVAL: 388 MS
EKG QRS DURATION: 86 MS
EKG QTC CALCULATION (BAZETT): 461 MS
EKG R AXIS: 70 DEGREES
EKG T AXIS: 61 DEGREES
EKG VENTRICULAR RATE: 85 BPM
EOSINOPHIL # BLD: 4.1 %
EOSINOPHILS ABSOLUTE: 0.2 THOU/MM3 (ref 0–0.4)
ERYTHROCYTE [DISTWIDTH] IN BLOOD BY AUTOMATED COUNT: 14.1 % (ref 11.5–14.5)
ERYTHROCYTE [DISTWIDTH] IN BLOOD BY AUTOMATED COUNT: 48.5 FL (ref 35–45)
GFR SERPL CREATININE-BSD FRML MDRD: 74 ML/MIN/1.73M2
GLUCOSE BLD-MCNC: 110 MG/DL (ref 70–108)
HCT VFR BLD CALC: 40.3 % (ref 37–47)
HEMOGLOBIN: 13.7 GM/DL (ref 12–16)
IMMATURE GRANS (ABS): 0.03 THOU/MM3 (ref 0–0.07)
IMMATURE GRANULOCYTES: 0.5 %
LIPASE: 55.5 U/L (ref 5.6–51.3)
LYMPHOCYTES # BLD: 35.3 %
LYMPHOCYTES ABSOLUTE: 2.1 THOU/MM3 (ref 1–4.8)
MCH RBC QN AUTO: 31.9 PG (ref 26–33)
MCHC RBC AUTO-ENTMCNC: 34 GM/DL (ref 32.2–35.5)
MCV RBC AUTO: 93.7 FL (ref 81–99)
MONOCYTES # BLD: 10.4 %
MONOCYTES ABSOLUTE: 0.6 THOU/MM3 (ref 0.4–1.3)
NUCLEATED RED BLOOD CELLS: 0 /100 WBC
OSMOLALITY CALCULATION: 281.2 MOSMOL/KG (ref 275–300)
PLATELET # BLD: 279 THOU/MM3 (ref 130–400)
PMV BLD AUTO: 9.2 FL (ref 9.4–12.4)
POTASSIUM SERPL-SCNC: 4.3 MEQ/L (ref 3.5–5.2)
RBC # BLD: 4.3 MILL/MM3 (ref 4.2–5.4)
SEG NEUTROPHILS: 48.5 %
SEGMENTED NEUTROPHILS ABSOLUTE COUNT: 2.9 THOU/MM3 (ref 1.8–7.7)
SODIUM BLD-SCNC: 140 MEQ/L (ref 135–145)
TOTAL PROTEIN: 7.5 G/DL (ref 6.1–8)
TROPONIN T: < 0.01 NG/ML
WBC # BLD: 5.9 THOU/MM3 (ref 4.8–10.8)

## 2018-08-10 PROCEDURE — 80053 COMPREHEN METABOLIC PANEL: CPT

## 2018-08-10 PROCEDURE — 36415 COLL VENOUS BLD VENIPUNCTURE: CPT

## 2018-08-10 PROCEDURE — 84484 ASSAY OF TROPONIN QUANT: CPT

## 2018-08-10 PROCEDURE — 82248 BILIRUBIN DIRECT: CPT

## 2018-08-10 PROCEDURE — 2580000003 HC RX 258: Performed by: NURSE PRACTITIONER

## 2018-08-10 PROCEDURE — 6360000002 HC RX W HCPCS: Performed by: NURSE PRACTITIONER

## 2018-08-10 PROCEDURE — 85025 COMPLETE CBC W/AUTO DIFF WBC: CPT

## 2018-08-10 PROCEDURE — 93005 ELECTROCARDIOGRAM TRACING: CPT | Performed by: NURSE PRACTITIONER

## 2018-08-10 PROCEDURE — 2709999900 HC NON-CHARGEABLE SUPPLY

## 2018-08-10 PROCEDURE — 99285 EMERGENCY DEPT VISIT HI MDM: CPT

## 2018-08-10 PROCEDURE — 83690 ASSAY OF LIPASE: CPT

## 2018-08-10 PROCEDURE — 94640 AIRWAY INHALATION TREATMENT: CPT

## 2018-08-10 PROCEDURE — 96374 THER/PROPH/DIAG INJ IV PUSH: CPT

## 2018-08-10 PROCEDURE — 6370000000 HC RX 637 (ALT 250 FOR IP): Performed by: NURSE PRACTITIONER

## 2018-08-10 PROCEDURE — 71045 X-RAY EXAM CHEST 1 VIEW: CPT

## 2018-08-10 RX ORDER — 0.9 % SODIUM CHLORIDE 0.9 %
1000 INTRAVENOUS SOLUTION INTRAVENOUS ONCE
Status: COMPLETED | OUTPATIENT
Start: 2018-08-10 | End: 2018-08-10

## 2018-08-10 RX ORDER — PREDNISONE 20 MG/1
20 TABLET ORAL 2 TIMES DAILY
Qty: 10 TABLET | Refills: 0 | Status: SHIPPED | OUTPATIENT
Start: 2018-08-10 | End: 2018-08-15 | Stop reason: SDUPTHER

## 2018-08-10 RX ORDER — IPRATROPIUM BROMIDE AND ALBUTEROL SULFATE 2.5; .5 MG/3ML; MG/3ML
2 SOLUTION RESPIRATORY (INHALATION) ONCE
Status: COMPLETED | OUTPATIENT
Start: 2018-08-10 | End: 2018-08-10

## 2018-08-10 RX ORDER — AMOXICILLIN 875 MG/1
875 TABLET, COATED ORAL 2 TIMES DAILY
Qty: 20 TABLET | Refills: 0 | Status: SHIPPED | OUTPATIENT
Start: 2018-08-10 | End: 2018-08-15

## 2018-08-10 RX ORDER — METHYLPREDNISOLONE SODIUM SUCCINATE 125 MG/2ML
125 INJECTION, POWDER, LYOPHILIZED, FOR SOLUTION INTRAMUSCULAR; INTRAVENOUS ONCE
Status: COMPLETED | OUTPATIENT
Start: 2018-08-10 | End: 2018-08-10

## 2018-08-10 RX ADMIN — METHYLPREDNISOLONE SODIUM SUCCINATE 125 MG: 125 INJECTION, POWDER, FOR SOLUTION INTRAMUSCULAR; INTRAVENOUS at 01:13

## 2018-08-10 RX ADMIN — SODIUM CHLORIDE 1000 ML: 9 INJECTION, SOLUTION INTRAVENOUS at 01:13

## 2018-08-10 RX ADMIN — IPRATROPIUM BROMIDE AND ALBUTEROL SULFATE 2 AMPULE: .5; 3 SOLUTION RESPIRATORY (INHALATION) at 01:17

## 2018-08-10 ASSESSMENT — ENCOUNTER SYMPTOMS
WHEEZING: 1
EYES NEGATIVE: 1
SHORTNESS OF BREATH: 1
RHINORRHEA: 0
CHEST TIGHTNESS: 0
COUGH: 1
GASTROINTESTINAL NEGATIVE: 1
BACK PAIN: 0

## 2018-08-10 NOTE — ED PROVIDER NOTES
EMERGENCY DEPARTMENT COURSE AND MEDICAL DECISION MAKING:   Vitals:    Vitals:    08/10/18 0100 08/10/18 0117   BP: 116/75    Pulse: 79    Resp: 18 (!) 31   Temp: 98 °F (36.7 °C)    TempSrc: Oral    SpO2: 95%    Weight: 162 lb (73.5 kg)    Height: 5' 5\" (1.651 m)          Pertinent Labs & Imaging studies reviewed. (See chart for details)         The patient was seen and evaluated in the ER for cough/sob. She is a long time COPD patient that smokes. She was treated with solumedrol and duonebs with drastic improvement. Labs were reassuring. Patient just had a cardiac cath done with normal coronaries. Discussed results with patient and she requests to go home. SHe is dc home with steroids and antibiotics. She is to follow up with pcp in 2-3 days. Strict return precautions and follow up instructions were discussed with the patient with which the patient agrees     Physical assessment findings, diagnostic testing(s) if applicable, and vital signs reviewed with patient/patient representative. Questions answered. Medications as directed, including OTC medications for supportive care. Education provided on medications. Differential diagnosis(s) discussed with patient/patient representative. Home care/self care instructions reviewed with patient/patient representative. Patient is to follow-up with family care provider in 2-3 days if no improvement. Patient is to go to the emergency department if symptoms worsen. Patient/patient representative is aware of care plan, questions answered, verbalizes understanding and is in agreement.      ED Medications administered this visit:    Medications   0.9 % sodium chloride bolus (0 mLs Intravenous Stopped 8/10/18 0213)   methylPREDNISolone sodium (SOLU-MEDROL) injection 125 mg (125 mg Intravenous Given 8/10/18 0113)   ipratropium-albuterol (DUONEB) nebulizer solution 2 ampule (2 ampules Inhalation Given 8/10/18 0117)           I have given the patient strict written and verbal instructions about care at home, follow-up, and signs and symptoms of worsening of condition and they did verbalize understanding. Patient was seen independently by myself. The Patient's final impression and disposition and plan was determined by myself.        CRITICAL CARE:   none    CONSULTS:  None    PROCEDURES:  None    FINAL IMPRESSION      1. COPD exacerbation ([facility] [address]          DISPOSITION/PLAN   DISPOSITION Decision To Discharge 08/10/2018 02:08:31 AM        PATIENT REFERRED TO:  Isabela Silverio 1, [address] [place] [address]  [phone]    Schedule an appointment as soon as possible for a visit in 2 days  For follow up      DISCHARGE MEDICATIONS:  Discharge Medication List as of 8/10/2018  2:10 AM      START taking these medications    Details   predniSONE (DELTASONE) 20 MG tablet Take 1 tablet by mouth 2 times daily for 5 days, Disp-10 tablet, R-0Print      amoxicillin (AMOXIL) 875 MG tablet Take 1 tablet by mouth 2 times daily for 10 days, Disp-20 tablet, R-0Print             (Please note that portions of this note were completed with a voice recognition program.  Efforts were made to edit the dictations but occasionally words are mis-transcribed.)    JAMEY Lambert CNP, APRN - CNP  08/10/18 1629

## 2018-08-11 PROCEDURE — 93010 ELECTROCARDIOGRAM REPORT: CPT | Performed by: INTERNAL MEDICINE

## 2018-08-15 ENCOUNTER — CARE COORDINATION (OUTPATIENT)
Dept: CARE COORDINATION | Age: 58
End: 2018-08-15

## 2018-08-15 ENCOUNTER — OFFICE VISIT (OUTPATIENT)
Dept: FAMILY MEDICINE CLINIC | Age: 58
End: 2018-08-15
Payer: COMMERCIAL

## 2018-08-15 VITALS
SYSTOLIC BLOOD PRESSURE: 104 MMHG | HEIGHT: 64 IN | HEART RATE: 88 BPM | DIASTOLIC BLOOD PRESSURE: 74 MMHG | RESPIRATION RATE: 20 BRPM | BODY MASS INDEX: 28.79 KG/M2 | OXYGEN SATURATION: 94 % | WEIGHT: 168.6 LBS

## 2018-08-15 DIAGNOSIS — G89.4 CHRONIC PAIN SYNDROME: ICD-10-CM

## 2018-08-15 DIAGNOSIS — R53.1 GENERAL WEAKNESS: ICD-10-CM

## 2018-08-15 DIAGNOSIS — B20 HIV (HUMAN IMMUNODEFICIENCY VIRUS INFECTION) (HCC): ICD-10-CM

## 2018-08-15 DIAGNOSIS — I25.10 CORONARY ARTERY DISEASE INVOLVING NATIVE CORONARY ARTERY OF NATIVE HEART WITHOUT ANGINA PECTORIS: ICD-10-CM

## 2018-08-15 DIAGNOSIS — J44.1 COPD EXACERBATION (HCC): Primary | ICD-10-CM

## 2018-08-15 PROCEDURE — 1036F TOBACCO NON-USER: CPT | Performed by: FAMILY MEDICINE

## 2018-08-15 PROCEDURE — G8417 CALC BMI ABV UP PARAM F/U: HCPCS | Performed by: FAMILY MEDICINE

## 2018-08-15 PROCEDURE — G8926 SPIRO NO PERF OR DOC: HCPCS | Performed by: FAMILY MEDICINE

## 2018-08-15 PROCEDURE — G8598 ASA/ANTIPLAT THER USED: HCPCS | Performed by: FAMILY MEDICINE

## 2018-08-15 PROCEDURE — 3023F SPIROM DOC REV: CPT | Performed by: FAMILY MEDICINE

## 2018-08-15 PROCEDURE — 3017F COLORECTAL CA SCREEN DOC REV: CPT | Performed by: FAMILY MEDICINE

## 2018-08-15 PROCEDURE — G8427 DOCREV CUR MEDS BY ELIG CLIN: HCPCS | Performed by: FAMILY MEDICINE

## 2018-08-15 PROCEDURE — 99214 OFFICE O/P EST MOD 30 MIN: CPT | Performed by: FAMILY MEDICINE

## 2018-08-15 RX ORDER — DOXYCYCLINE HYCLATE 100 MG
100 TABLET ORAL 2 TIMES DAILY
Qty: 14 TABLET | Refills: 0 | Status: SHIPPED | OUTPATIENT
Start: 2018-08-15 | End: 2018-08-22

## 2018-08-15 RX ORDER — PREDNISONE 20 MG/1
20 TABLET ORAL 2 TIMES DAILY
Qty: 10 TABLET | Refills: 0 | Status: SHIPPED | OUTPATIENT
Start: 2018-08-15 | End: 2018-08-20

## 2018-08-15 RX ORDER — BENZONATATE 100 MG/1
CAPSULE ORAL
Qty: 15 CAPSULE | Refills: 0 | Status: SHIPPED | OUTPATIENT
Start: 2018-08-15 | End: 2018-11-01 | Stop reason: ALTCHOICE

## 2018-08-15 ASSESSMENT — ENCOUNTER SYMPTOMS
COUGH: 1
GASTROINTESTINAL NEGATIVE: 1
DYSPNEA ASSOCIATED WITH: MINIMAL EXERTION
SHORTNESS OF BREATH: 1
CHEST TIGHTNESS: 1
WHEEZING: 1

## 2018-08-15 NOTE — CARE COORDINATION
Wendi Shrestha at [facility] re: power chair script. She states that script was received. They are needing CMN completed. Informed her that office has not received CMN form to be completed. States that she will fax that to office today. Also states that they will need most recent office notes with documentation supporting need for power chair (even though pt has had power chair in past) and insurance info. Notes and ins. Info  will need faxed to [facility]. At [phone]. Please advise.
Increased  Sputum characteristics:  Yellow  Have you had a recent diagnosis of pneumonia either by PCP or at a hospital?:  No      and   General Assessment                   Care Coordination Interventions    Program Enrollment:  Complex Care  Referral from Primary Care Provider:  No  Suggested Interventions and [place]:  Completed (Comment: [facility]. nurse 1x wk)  Meals on Wheels:  Not Started (Comment: contact number for [employer] and [employer] provided to pt)  Medi Set or Pill Pack:  Declined (Comment: pt does not use medi set. states that she needs to get one.  tried [facility] exact paks, but did not like)  Other Services:  (Comment: has CM Rupinder Lu with [employer])  Transportation Support:  Completed  Zone Management Tools: In Process (Comment: reviewed and mailed COPD zone mgmt tool sheet. 7/11 pt reports that she did not receive zone mgmt. in mail. new COPD zone mgmt tool sheet mailed to pt. )  Other Services or Interventions:  CHW through [place], [address] with [facility]         Goals Addressed     None          Prior to Admission medications    Medication Sig Start Date End Date Taking?  Authorizing Provider   Multiple Vitamin (MULTI VITAMIN DAILY PO) Take by mouth    Historical Provider, MD   benzonatate (TESSALON) 100 MG capsule TAKE ONE (1) CAPSULE BY MOUTH THREE TIMES A DAY FOR COUGH 8/15/18   Nighat Kam, DO   predniSONE (DELTASONE) 20 MG tablet Take 1 tablet by mouth 2 times daily for 5 days 8/15/18 8/20/18  Nighat Kam, DO   doxycycline hyclate (VIBRA-TABS) 100 MG tablet Take 1 tablet by mouth 2 times daily for 7 days 8/15/18 8/22/18  Nighat Kam, DO   aclidinium [facility]) 400 MCG/ACT AEPB inhaler inhale 1 puff INTO THE LUNGS twice a day 8/9/18   Nighat Kam, DO   nicotine (NICODERM CQ) 21 MG/24HR Place 1 patch onto the skin every 24 hours    Historical Provider, MD   mirtazapine (REMERON) 30 MG tablet Take

## 2018-08-15 NOTE — PROGRESS NOTES
Subjective:      Patient ID: Harshad Altamirano is a 62 y.o. female. HPI:    Chief Complaint   Patient presents with    Cough    Chest Congestion    Nasal Congestion     Pt here for sinus and chest congestion for the last few weeks. No fevers. Some wheezing. Still smoking. A lot of sinus pressure and congestion. Using rescue inhaler which helps. Has not been able to fill the [place]. Pt has hx of CAD and COPD. General weakness and deconditioning. Needs power wheelchair for ambulation. Patient Active Problem List   Diagnosis    Asthma    CAD (coronary artery disease)    Hyperlipemia    Chronic pain syndrome    Bipolar disease, chronic (HCC)    Schizophrenia (HCC)    Tobacco abuse    PTSD (post-traumatic stress disorder)    Environmental allergies    Chronic obstructive asthma (HCC)    Chronic sinusitis    Vaginal dysplasia    Dehydration    [facility] disease) [facility]    Dizziness and giddiness    HIV (human immunodeficiency virus infection) ([address]    Vomiting    Acid reflux    Weight loss    Chronic cholecystitis with calculus    COPD with acute exacerbation (HCC)    Chest pain on breathing    Immunocompromised state ([address]    Chronic obstructive pulmonary disease with acute exacerbation ([address]    Acute on chronic respiratory failure with hypoxia and hypercapnia ([address]    Angina, class II ([address]    Abnormal stress test     Past Surgical History:   Procedure Laterality Date    CHOLECYSTECTOMY, LAPAROSCOPIC  5/5/16    [facility]    COLONOSCOPY  2014    ENDOSCOPY, COLON, DIAGNOSTIC      FOOT SURGERY  unsure    HERNIA REPAIR  unsure    HYSTERECTOMY  98    JOINT REPLACEMENT  unsure    Right knee    UPPER GASTROINTESTINAL ENDOSCOPY  2014     Prior to Admission medications    Medication Sig Start Date End Date Taking?  Authorizing Provider   Multiple Vitamin (MULTI VITAMIN DAILY PO) Take by mouth   Yes Historical Provider, MD   predniSONE (Sean Ricks) 20 MG tablet Take 1 tablet by mouth 2 times daily for 5 days 8/10/18 8/15/18 Yes JAMEY Dejesus CNP   amoxicillin (AMOXIL) 875 MG tablet Take 1 tablet by mouth 2 times daily for 10 days 8/10/18 8/20/18 Yes JAMEY Dejesus CNP   aclidinium (TUDORZA PRESSAIR) 400 MCG/ACT AEPB inhaler inhale 1 puff INTO THE LUNGS twice a day 8/9/18  Yes Champ Yañez DO   nicotine (NICODERM CQ) 21 MG/24HR Place 1 patch onto the skin every 24 hours   Yes Historical Provider, MD   mirtazapine (REMERON) 30 MG tablet Take 0.5-1 tablets by mouth nightly As needed 6/14/18  Yes Champ Yañez DO   guaiFENesin [facility]) 600 MG extended release tablet TAKE ONE (1) TABLET BY MOUTH TWICE DAILY 6/14/18  Yes Champ Yañez DO   cetirizine-psuedoephedrine (RA CETIRI-D) 5-120 MG per extended release tablet take 1 tablet by mouth twice a day 6/14/18  Yes Champ Yañez DO   albuterol (PROVENTIL) (2.5 MG/3ML) 0.083% nebulizer solution inhale contents of 1 vial in nebulizer every 6 hours if needed 6/14/18  Yes Champ Yañez DO   albuterol sulfate HFA (VENTOLIN HFA) 108 (90 Base) MCG/ACT inhaler Inhale 2 puffs into the lungs every 4 hours as needed for Wheezing 4/30/18  Yes Champ Yañez DO   benzonatate (TESSALON) 100 MG capsule TAKE ONE (1) CAPSULE BY MOUTH THREE TIMES A DAY FOR COUGH 8/8/17  Yes Champ Yañez DO   ATRIPLA 600-200-300 MG per tablet TAKE 1 TABLET BY MOUTH NIGHTLY 6/27/17  Yes Champ Yañez DO   PARoxetine (PAXIL) 30 MG tablet Take 30 mg by mouth daily   Yes Historical Provider, MD   risperiDONE (RISPERDAL) 3 MG tablet Take 3 mg by mouth 2 times daily   Yes Historical Provider, MD   OXYGEN Inhale 2 L into the lungs nightly   Yes Historical Provider, MD   omeprazole (PRILOSEC) 40 MG capsule Take 1 capsule by mouth 2 times daily 6/23/15  Yes Stefan Luis MD         Review of Systems   Constitutional: Negative. HENT: Positive for congestion.     Respiratory: Positive for cough, chest tightness, shortness of breath and wheezing. Cardiovascular: Negative. Gastrointestinal: Negative. Musculoskeletal: Negative. All other systems reviewed and are negative. Objective:   Physical Exam   Constitutional: She is oriented to person, place, and time. She appears well-developed and well-nourished. HENT:   Head: Normocephalic and atraumatic. Right Ear: Tympanic membrane normal.   Left Ear: Tympanic membrane normal.   Mouth/Throat: Oropharynx is clear and moist and mucous membranes are normal.   Cardiovascular: Normal rate, regular rhythm and normal heart sounds. No murmur heard. Pulmonary/Chest: Effort normal. She has wheezes. She has rhonchi. Abdominal: Soft. Bowel sounds are normal.   Musculoskeletal: She exhibits no edema. Neurological: She is alert and oriented to person, place, and time. Skin: Skin is warm and dry. Psychiatric: She has a normal mood and affect. Her behavior is normal.   Nursing note and vitals reviewed. Assessment:       Diagnosis Orders   1. COPD exacerbation (HCC)  benzonatate (TESSALON) 100 MG capsule    predniSONE (DELTASONE) 20 MG tablet    doxycycline hyclate (VIBRA-TABS) 100 MG tablet   2. HIV (human immunodeficiency virus infection) [facility]     3. Coronary artery disease involving native coronary artery of native heart without angina pectoris     4. Chronic pain syndrome     5.  General weakness           Plan:       -  Chronic medical problems stable  -  Continue current medications  -  Follow up with specialists as scheduled  -  Orders above  -  DC amoxicillin  -  Continue albuterol prn  -  Pt continues to need the aid of a power wheelchair due to CAD, COPD, HIV, chronic pain syndrome, and general weakness  -  RTO prn        Rylan Nichols DO

## 2018-08-16 ENCOUNTER — TELEPHONE (OUTPATIENT)
Dept: FAMILY MEDICINE CLINIC | Age: 58
End: 2018-08-16

## 2018-08-16 NOTE — TELEPHONE ENCOUNTER
Noted. Attempted to reach Lou Quintanilla to update re: power chair. Spoke with Prabha at PCP office. Informed that PA for Charmaine Garcia was received from [facility]. Once response obtained office will contact pt to inform.

## 2018-08-16 NOTE — TELEPHONE ENCOUNTER
Received call from pt. States that office just contacted her and informed her Damien Boone has been approved. Updated her on power chair. Informed her that HME needs CMN completed. Once obtained from them and completed by PCP notes will be faxed back to them. Verbalizes understanding.

## 2018-08-16 NOTE — TELEPHONE ENCOUNTER
We received a fax from [employer] requesting a [place]. This was done through [facility], we should have a response within 24hrs.

## 2018-08-20 ENCOUNTER — TELEPHONE (OUTPATIENT)
Dept: FAMILY MEDICINE CLINIC | Age: 58
End: 2018-08-20

## 2018-08-20 NOTE — TELEPHONE ENCOUNTER
Bee with [address] called office left vm regarding the order for a power wheelchair. She needs a copy of recent office notes, demographics and insurance card. These were printed and faxed to her at [phone].

## 2018-08-22 ENCOUNTER — TELEPHONE (OUTPATIENT)
Dept: FAMILY MEDICINE CLINIC | Age: 58
End: 2018-08-22

## 2018-08-22 DIAGNOSIS — J44.9 CHRONIC OBSTRUCTIVE PULMONARY DISEASE, UNSPECIFIED COPD TYPE (HCC): ICD-10-CM

## 2018-08-22 DIAGNOSIS — I25.10 CORONARY ARTERY DISEASE INVOLVING NATIVE CORONARY ARTERY OF NATIVE HEART WITHOUT ANGINA PECTORIS: ICD-10-CM

## 2018-08-22 DIAGNOSIS — Z99.3 USES POWERED WHEELCHAIR: Primary | ICD-10-CM

## 2018-08-22 DIAGNOSIS — R53.81 PHYSICAL DECONDITIONING: ICD-10-CM

## 2018-08-24 ENCOUNTER — TELEPHONE (OUTPATIENT)
Dept: FAMILY MEDICINE CLINIC | Age: 58
End: 2018-08-24

## 2018-08-24 NOTE — TELEPHONE ENCOUNTER
Trip Mon with [address] called office stating they received the order for wheelchair eval but it was ordered under PT. This needs ordered under OT and the PT order needs closed out. She will check EPIC for this later today. Please advise.

## 2018-08-29 ENCOUNTER — CARE COORDINATION (OUTPATIENT)
Dept: CARE COORDINATION | Age: 58
End: 2018-08-29

## 2018-08-29 NOTE — CARE COORDINATION
Received call from [facility] with [employer]. She spoke with [address] [place] [address] regarding power chair. Script was received, but was too dark to read. Also still need CMN. Informed her that upon review does not look like we ever received CMN. States that she will have [address] [place] [address] fax CMN form again. Once completed please fax new power chair script with dx and CMN form to [address] [place] [address] at [phone].

## 2018-08-30 ENCOUNTER — OFFICE VISIT (OUTPATIENT)
Dept: PULMONOLOGY | Age: 58
End: 2018-08-30
Payer: COMMERCIAL

## 2018-08-30 VITALS
SYSTOLIC BLOOD PRESSURE: 112 MMHG | WEIGHT: 172.4 LBS | RESPIRATION RATE: 24 BRPM | HEIGHT: 64 IN | OXYGEN SATURATION: 96 % | TEMPERATURE: 98.8 F | HEART RATE: 92 BPM | BODY MASS INDEX: 29.43 KG/M2 | DIASTOLIC BLOOD PRESSURE: 68 MMHG

## 2018-08-30 DIAGNOSIS — Z87.891 PERSONAL HISTORY OF TOBACCO USE: ICD-10-CM

## 2018-08-30 DIAGNOSIS — J96.11 CHRONIC RESPIRATORY FAILURE WITH HYPOXIA (HCC): ICD-10-CM

## 2018-08-30 DIAGNOSIS — F17.200 SMOKER: ICD-10-CM

## 2018-08-30 DIAGNOSIS — G47.33 OSA (OBSTRUCTIVE SLEEP APNEA): ICD-10-CM

## 2018-08-30 DIAGNOSIS — J42 CHRONIC BRONCHITIS, UNSPECIFIED CHRONIC BRONCHITIS TYPE (HCC): ICD-10-CM

## 2018-08-30 DIAGNOSIS — J44.9 STAGE 2 MODERATE COPD BY GOLD CLASSIFICATION (HCC): Primary | ICD-10-CM

## 2018-08-30 DIAGNOSIS — F17.218 CIGARETTE NICOTINE DEPENDENCE WITH OTHER NICOTINE-INDUCED DISORDER: ICD-10-CM

## 2018-08-30 DIAGNOSIS — F31.9 BIPOLAR 1 DISORDER (HCC): ICD-10-CM

## 2018-08-30 PROCEDURE — G0296 VISIT TO DETERM LDCT ELIG: HCPCS | Performed by: INTERNAL MEDICINE

## 2018-08-30 PROCEDURE — G8926 SPIRO NO PERF OR DOC: HCPCS | Performed by: INTERNAL MEDICINE

## 2018-08-30 PROCEDURE — 99204 OFFICE O/P NEW MOD 45 MIN: CPT | Performed by: INTERNAL MEDICINE

## 2018-08-30 PROCEDURE — G8417 CALC BMI ABV UP PARAM F/U: HCPCS | Performed by: INTERNAL MEDICINE

## 2018-08-30 PROCEDURE — 3017F COLORECTAL CA SCREEN DOC REV: CPT | Performed by: INTERNAL MEDICINE

## 2018-08-30 PROCEDURE — 1036F TOBACCO NON-USER: CPT | Performed by: INTERNAL MEDICINE

## 2018-08-30 PROCEDURE — G8427 DOCREV CUR MEDS BY ELIG CLIN: HCPCS | Performed by: INTERNAL MEDICINE

## 2018-08-30 PROCEDURE — G8598 ASA/ANTIPLAT THER USED: HCPCS | Performed by: INTERNAL MEDICINE

## 2018-08-30 PROCEDURE — 94618 PULMONARY STRESS TESTING: CPT | Performed by: INTERNAL MEDICINE

## 2018-08-30 PROCEDURE — 3023F SPIROM DOC REV: CPT | Performed by: INTERNAL MEDICINE

## 2018-08-30 RX ORDER — BUPROPION HYDROCHLORIDE 100 MG/1
100 TABLET ORAL 2 TIMES DAILY
Status: ON HOLD | COMMUNITY
End: 2019-06-30

## 2018-08-30 ASSESSMENT — ENCOUNTER SYMPTOMS
RHINORRHEA: 0
COUGH: 1
WHEEZING: 1
SHORTNESS OF BREATH: 1
STRIDOR: 1
ABDOMINAL DISTENTION: 0
CHEST TIGHTNESS: 1
BACK PAIN: 1

## 2018-08-30 NOTE — PROGRESS NOTES
Subjective:      Patient ID: Araseli Andrea is a 62 y.o. female. CC:COPD    HPI     Yanni Holbrook has COPD, and is a heavy smoker, referred by Dr Tamiko Lovell not use CPAP  On supplemental oxygen but does not use during the day  C/O Chronic cough, SOB, IQBAL, frequent exacerbations, recently discharged from the hospital with same. --not on ICS due to active smoking  HIV-1 on Atripla  Has psychiatric issues she reports \"bipolar, schizo and depression\" follows with Dr Teresa Murillo, saw him yesterday and meds were changed. Not on Remeron any more  Started smoking at age 15 smoked up to 2 ppd tells me now she smokes 1/2 ppd  Never been intubated or vented  Several exacerbations a year  Has YOLA and supplemental oxygen  Does not use CPAP  Uses oxygen to sleep only  Had Flu shot last year    Review of Systems   Constitutional: Positive for fatigue and unexpected weight change (gained wt). Negative for diaphoresis and fever. HENT: Negative for rhinorrhea. Respiratory: Positive for cough, chest tightness, shortness of breath, wheezing and stridor. Cardiovascular: Positive for chest pain and leg swelling. Gastrointestinal: Negative for abdominal distention. Endocrine: Negative for cold intolerance. Musculoskeletal: Positive for arthralgias, back pain and neck pain. Negative for myalgias. Allergic/Immunologic: Negative for immunocompromised state. Neurological: Positive for tremors. Negative for weakness. Hematological: Negative for adenopathy. Psychiatric/Behavioral: Positive for agitation, behavioral problems and suicidal ideas.          All other systems reviewed and are negative    Lung Nodule Screening     [] Qualifies    [x] Does not qualify   [] Declined   [] Completed  Past Medical History:   Diagnosis Date    Acid reflux 8/6/2014    Anxiety     Arthritis     hips, back    Asthma     B[facility] disorder ([facility]     CAD (coronary artery disease)     Cancer (HCC)     cervical,uterine    Chronic back pain    

## 2018-08-30 NOTE — TELEPHONE ENCOUNTER
Received call back from [facility]. She states that she spoke with outpt OT dept re: power chair say.  Some confusion re: DME supplier. Shanice Gallardo provided them with correct info and they will contact pt to reschedule appt so that CMN can be completed.

## 2018-08-30 NOTE — TELEPHONE ENCOUNTER
Noted.  Reviewed appt desk. appt was arranged for outpt OT eval for power chair, but was cancelled. Reason in appt desk states ins. Does not cover. Spoke with [address] with [employer] re: this. She states that they do cover this. She states that she will contact outpt OT to discuss further.

## 2018-09-12 ENCOUNTER — CARE COORDINATION (OUTPATIENT)
Dept: CARE COORDINATION | Age: 58
End: 2018-09-12

## 2018-09-12 NOTE — CARE COORDINATION
Received call from Magdi Jerry with [employer] today re: power chair/scooter. States that [facility] is unable to do the mobility eval b/c [facility] is not able to send someone to assist with evaluation. Magdi Jerry states that she has been in contact with [address] [place] [address] and feels as though they will be able to carry out order for pt. Contact for [address] [place] [address] given: [phone] Fax: [phone]. Magdi Jerry requesting info be faxed to [address] at [address] [place] [address].  Discussed with her info needed. States that they will need order with dx, signature, length of need, physician NPI number, and date by physician signature on script, along with office notes supporting need for power chair/scooter, and mobility evaluation. Informed her that mobility eval are not completed her at office. Informed her we have OT dept complete mobility eval.  Pt needs to have this completed prior to them coming to evaluate home. Informed her that I would need to contact Magdi Jerry back with [employer] to discuss this see what options are. Contacted Reny with [address]. She states that she will contact Gina Service at [facility]. Shira's OT to discuss further. Magdi Jerry returned call. States that [employer] Service informed her outside vendor must be present during OT eval and [employer] was not able to provide that which is why eval was cancelled. Informed her that I will have to check with Asuncion Berg at [facility] to see if they can provide vendor to come for OT eval.      Attempted to reach Asuncion Berg at [address] [place] [address].  Message left as Asuncion Berg is not available at this time. Received call back from Asuncion Berg at [address] [place] [address].  She states that she will have to check in to seeing if they have someone that is able to do this. Asuncion Berg checked with billing Mgr and informed that [facility]- [facility] home eval  could arrange to be present at mobility eval if this is a requirement. Attempted to reach outpt OT dept to discuss and reschedule.   No answer. Message left requesting call back. Received return message from outpt OT. Attempted to reach outpt OT. Message left. Received return call from Harper Rojas at [facility]. Discussed with her situation. She reviewed chart. States that [employer] is company they typically use as their vendor if pt does not have another vendor of choice. [facility] does not cover [employer]. She is unsure why mobility eval could not be done without vendor present and info faxed to them, but I informed her that pt is no longer going to go with [facility].  She is going to go with [address] [place] [address] and informed her that I have spoken with them and have discussed if rep could be present during mobility eval and they said yes. Blade Barton is  and home evaluation  with [facility] who will be the one attending the appt. Provided Harper Rojas with his contact info : [phone] so that she can coordinate with him a day and time to have evaluation completed. She will contact myself once appt is arranged. Received call from Sruthi Cain at [address] [place] [address].  She states that she has been in touch with Blade Barton. Unfortunately his mailbox was full yesterday so if therapy tried to get in touch with him they may have not been able to get ahold of him. Informed that if further problems contacting him to coordinate appt therapy can contact office directly at [phone]. Attempted to reach Harper Rojas at  therapy. Not available at this time. Message left for return call. Received call back from Harper Rojas. She states that [address] [place] [address] will only cover standard power chair and not any customizations should pt need any. She is concerned that if pt needs customization she will have to go with another DME vendor. Informed her that I would contact Marv Reyes with [employer] to update her and see if she would like to proceed or try to locate another vendor.       Attempted to reach Marv Reyes: [phone]. No answer. Left message to return call. Received call back from [employer]. Updated her in re: to [facility] not being able to do any customizations. She states that she will look in to finding another DME company and call back once she finds one. Received message from [employer] with [employer] requesting call back. Attempted to call her back. No answer. Message left to return call. Received call back from [employer] with [employer]. She informs me that Kaylyn's assigned CM and CHW have signed off on the pt's case as I am now following the pt. Will look in to further. She is still working on trying to locate DME company that will provide customization to power chair should [facility] need that. Attempted to reach [facility] to see if she had any customization done on last power chair. If not may be able to still go with [facility].  If so will have to look at finding another DME company that does provide customization. Spoke with [facility]. She states that her last chair did not require any customization and does not feel she will need any customization with a new chair. Wanting to proceed with [facility]. Pt's previous vendor was [employer] and they no longer take [employer]. Spoke with [employer] to see if they ever provided customization on power chairs. They have only ever provided standard power chairs for pt. Message left for Maribel More at [address] therapy re: this. Informed her that pt is aware [address] [place] [address] only provides standard power chairs and is not able to do any customizations if needed  and that pt still wants to go with them. Advised her to call with any questions. Contacted outpt therapy. Informed that they have tried getting in touch with Adalid Hogan with [facility] 2x now and have not received return call. Asked to speak with Vanda Barrow. She is currently out at present time and will not be back until Monday.   Informed that they would give Adalid Mis message directly. Direct contact number to outpt therapy provided. Advised them if any ?'s or problems getting in contact to contact me. Received call back from Yudi at [address] therapy. Informed that they were finally able to make contact with Vernon Howard from [address] [place] [address] to arrange mobility eval.  appt set for 10/9 at 1pm.  Pt has been contacted by therapy dept and given date and time of appt.

## 2018-09-13 DIAGNOSIS — J44.9 STAGE 2 MODERATE COPD BY GOLD CLASSIFICATION (HCC): ICD-10-CM

## 2018-09-26 ENCOUNTER — TELEPHONE (OUTPATIENT)
Dept: FAMILY MEDICINE CLINIC | Age: 58
End: 2018-09-26

## 2018-09-26 RX ORDER — OMEPRAZOLE 40 MG/1
40 CAPSULE, DELAYED RELEASE ORAL 2 TIMES DAILY
Qty: 60 CAPSULE | Refills: 11 | Status: SHIPPED | OUTPATIENT
Start: 2018-09-26 | End: 2019-09-28 | Stop reason: SDUPTHER

## 2018-09-26 RX ORDER — IBUPROFEN 800 MG/1
800 TABLET ORAL EVERY 8 HOURS PRN
Qty: 90 TABLET | Refills: 0 | Status: SHIPPED | OUTPATIENT
Start: 2018-09-26 | End: 2018-12-12 | Stop reason: SDUPTHER

## 2018-09-26 NOTE — TELEPHONE ENCOUNTER
9/26/18  Nicole at [facility], requesting refill on Ibuprofen 800 mg tid and Omeprazole 40 mg qd to [facility].   Thanks/blm  Dolv: 8/15/18

## 2018-10-09 ENCOUNTER — HOSPITAL ENCOUNTER (OUTPATIENT)
Dept: OCCUPATIONAL THERAPY | Age: 58
Setting detail: THERAPIES SERIES
Discharge: HOME | End: 2018-10-09
Payer: COMMERCIAL

## 2018-10-09 PROCEDURE — 97542 WHEELCHAIR MNGMENT TRAINING: CPT

## 2018-10-09 PROCEDURE — 97165 OT EVAL LOW COMPLEX 30 MIN: CPT

## 2018-10-09 ASSESSMENT — PAIN SCALES - GENERAL: PAINLEVEL_OUTOF10: 10

## 2018-10-09 ASSESSMENT — PAIN DESCRIPTION - LOCATION: LOCATION: FOOT;LEG

## 2018-10-09 ASSESSMENT — PAIN DESCRIPTION - ORIENTATION: ORIENTATION: RIGHT;LEFT

## 2018-10-09 ASSESSMENT — PAIN DESCRIPTION - PAIN TYPE: TYPE: CHRONIC PAIN

## 2018-10-22 ENCOUNTER — CARE COORDINATION (OUTPATIENT)
Dept: CARE COORDINATION | Age: 58
End: 2018-10-22

## 2018-10-22 RX ORDER — ASPIRIN 81 MG/1
TABLET ORAL
Qty: 30 TABLET | Refills: 3 | Status: SHIPPED | OUTPATIENT
Start: 2018-10-22 | End: 2019-02-26 | Stop reason: ALTCHOICE

## 2018-10-22 ASSESSMENT — ENCOUNTER SYMPTOMS: DYSPNEA ASSOCIATED WITH: EXERTION

## 2018-10-22 NOTE — CARE COORDINATION
General Assessment    Do you have any symptoms that are causing concern?:  No             Care Coordination Interventions    Program Enrollment:  Complex Care  Referral from Primary Care Provider:  No  Suggested Interventions and [address]:  Completed (Comment: [facility]. nurse 1x wk)  Meals on Wheels:  Not Started (Comment: contact number for [employer] provided to pt)  Medi Set or Pill Pack:  Declined (Comment: pt does not use medi set. states that she needs to get one.  tried [facility] exact paks, but did not like)  Other Services:  (Comment: has DEYANIRA Miller with [employer])  Transportation Support:  Completed  Zone Management Tools: In Process (Comment: reviewed and mailed COPD zone mgmt tool sheet. 7/11 pt reports that she did not receive zone mgmt. in mail. new COPD zone mgmt tool sheet mailed to pt. )  Other Services or Interventions:  CHW through Clark Cobb, [address] with [facility]         Goals Addressed     None          Prior to Admission medications    Medication Sig Start Date End Date Taking? Authorizing Provider   ASPIR-LOW 81 MG EC tablet take 1 tablet by mouth once daily 10/22/18   Kailash Kate MD   omeprazole (PRILOSEC) 40 MG delayed release capsule Take 1 capsule by mouth 2 times daily 9/26/18   Leny Parents, DO   ibuprofen (IBU) 800 MG tablet Take 1 tablet by mouth every 8 hours as needed for Pain Take with food.  9/26/18   Leny Parents, DO   buPROPion (WELLBUTRIN) 100 MG tablet Take 100 mg by mouth 2 times daily    Historical Provider, MD   lurasidone (LATUDA) 40 MG TABS tablet Take 40 mg by mouth daily    Historical Provider, MD   TRAZODONE HCL PO Take by mouth    Historical Provider, MD   mometasone-formoterol [facility]) 200-5 MCG/ACT inhaler Inhale 2 puffs into the lungs every 12 hours 8/30/18   Wero Joel MD   Multiple Vitamin (MULTI VITAMIN DAILY PO) Take by mouth    Historical Provider, MD   benzonatate

## 2018-10-23 ENCOUNTER — INITIAL CONSULT (OUTPATIENT)
Dept: PULMONOLOGY | Age: 58
End: 2018-10-23
Payer: COMMERCIAL

## 2018-10-23 ENCOUNTER — CARE COORDINATION (OUTPATIENT)
Dept: CARE COORDINATION | Age: 58
End: 2018-10-23

## 2018-10-23 ENCOUNTER — TELEPHONE (OUTPATIENT)
Dept: PULMONOLOGY | Age: 58
End: 2018-10-23

## 2018-10-23 VITALS
HEART RATE: 64 BPM | DIASTOLIC BLOOD PRESSURE: 82 MMHG | SYSTOLIC BLOOD PRESSURE: 116 MMHG | WEIGHT: 166 LBS | OXYGEN SATURATION: 96 % | BODY MASS INDEX: 28.34 KG/M2 | HEIGHT: 64 IN

## 2018-10-23 DIAGNOSIS — J44.9 CHRONIC OBSTRUCTIVE PULMONARY DISEASE, UNSPECIFIED COPD TYPE (HCC): ICD-10-CM

## 2018-10-23 DIAGNOSIS — J96.12 CHRONIC RESPIRATORY FAILURE WITH HYPERCAPNIA (HCC): ICD-10-CM

## 2018-10-23 DIAGNOSIS — G47.33 OSA (OBSTRUCTIVE SLEEP APNEA): Primary | ICD-10-CM

## 2018-10-23 PROCEDURE — 3017F COLORECTAL CA SCREEN DOC REV: CPT | Performed by: INTERNAL MEDICINE

## 2018-10-23 PROCEDURE — G8926 SPIRO NO PERF OR DOC: HCPCS | Performed by: INTERNAL MEDICINE

## 2018-10-23 PROCEDURE — 99213 OFFICE O/P EST LOW 20 MIN: CPT | Performed by: INTERNAL MEDICINE

## 2018-10-23 PROCEDURE — G8417 CALC BMI ABV UP PARAM F/U: HCPCS | Performed by: INTERNAL MEDICINE

## 2018-10-23 PROCEDURE — G8484 FLU IMMUNIZE NO ADMIN: HCPCS | Performed by: INTERNAL MEDICINE

## 2018-10-23 PROCEDURE — 4004F PT TOBACCO SCREEN RCVD TLK: CPT | Performed by: INTERNAL MEDICINE

## 2018-10-23 PROCEDURE — 3023F SPIROM DOC REV: CPT | Performed by: INTERNAL MEDICINE

## 2018-10-23 PROCEDURE — G8427 DOCREV CUR MEDS BY ELIG CLIN: HCPCS | Performed by: INTERNAL MEDICINE

## 2018-10-23 PROCEDURE — G8598 ASA/ANTIPLAT THER USED: HCPCS | Performed by: INTERNAL MEDICINE
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
## 2018-10-25 ENCOUNTER — TELEPHONE (OUTPATIENT)
Dept: FAMILY MEDICINE CLINIC | Age: 58
End: 2018-10-25

## 2018-10-30 ENCOUNTER — OFFICE VISIT (OUTPATIENT)
Dept: PULMONOLOGY | Age: 58
End: 2018-10-30
Payer: COMMERCIAL

## 2018-10-30 VITALS
WEIGHT: 172.2 LBS | HEART RATE: 68 BPM | RESPIRATION RATE: 24 BRPM | TEMPERATURE: 97.8 F | HEIGHT: 64 IN | OXYGEN SATURATION: 94 % | BODY MASS INDEX: 29.4 KG/M2 | DIASTOLIC BLOOD PRESSURE: 68 MMHG | SYSTOLIC BLOOD PRESSURE: 104 MMHG

## 2018-10-30 DIAGNOSIS — F17.218 CIGARETTE NICOTINE DEPENDENCE WITH OTHER NICOTINE-INDUCED DISORDER: ICD-10-CM

## 2018-10-30 DIAGNOSIS — F31.9 BIPOLAR 1 DISORDER (HCC): ICD-10-CM

## 2018-10-30 DIAGNOSIS — J44.9 CHRONIC OBSTRUCTIVE PULMONARY DISEASE, UNSPECIFIED COPD TYPE (HCC): ICD-10-CM

## 2018-10-30 DIAGNOSIS — J44.1 COPD WITH ACUTE EXACERBATION (HCC): Primary | ICD-10-CM

## 2018-10-30 PROCEDURE — 3023F SPIROM DOC REV: CPT | Performed by: NURSE PRACTITIONER

## 2018-10-30 PROCEDURE — 99214 OFFICE O/P EST MOD 30 MIN: CPT | Performed by: NURSE PRACTITIONER

## 2018-10-30 PROCEDURE — G8417 CALC BMI ABV UP PARAM F/U: HCPCS | Performed by: NURSE PRACTITIONER

## 2018-10-30 PROCEDURE — 3017F COLORECTAL CA SCREEN DOC REV: CPT | Performed by: NURSE PRACTITIONER

## 2018-10-30 PROCEDURE — G8427 DOCREV CUR MEDS BY ELIG CLIN: HCPCS | Performed by: NURSE PRACTITIONER

## 2018-10-30 PROCEDURE — G8484 FLU IMMUNIZE NO ADMIN: HCPCS | Performed by: NURSE PRACTITIONER

## 2018-10-30 PROCEDURE — G8926 SPIRO NO PERF OR DOC: HCPCS | Performed by: NURSE PRACTITIONER

## 2018-10-30 PROCEDURE — G8598 ASA/ANTIPLAT THER USED: HCPCS | Performed by: NURSE PRACTITIONER

## 2018-10-30 PROCEDURE — 4004F PT TOBACCO SCREEN RCVD TLK: CPT | Performed by: NURSE PRACTITIONER

## 2018-10-30 RX ORDER — DOXYCYCLINE HYCLATE 100 MG/1
100 CAPSULE ORAL 2 TIMES DAILY
Qty: 14 CAPSULE | Refills: 0 | Status: SHIPPED | OUTPATIENT
Start: 2018-10-30 | End: 2018-11-06

## 2018-10-30 RX ORDER — BUDESONIDE AND FORMOTEROL FUMARATE DIHYDRATE 160; 4.5 UG/1; UG/1
2 AEROSOL RESPIRATORY (INHALATION) 2 TIMES DAILY
Qty: 1 INHALER | Refills: 11 | Status: SHIPPED | OUTPATIENT
Start: 2018-10-30 | End: 2019-01-03 | Stop reason: CLARIF

## 2018-10-30 RX ORDER — PREDNISONE 20 MG/1
40 TABLET ORAL DAILY
Qty: 10 TABLET | Refills: 0 | Status: SHIPPED | OUTPATIENT
Start: 2018-10-30 | End: 2018-11-04

## 2018-10-30 RX ORDER — IPRATROPIUM BROMIDE AND ALBUTEROL SULFATE 2.5; .5 MG/3ML; MG/3ML
1 SOLUTION RESPIRATORY (INHALATION) 4 TIMES DAILY
Qty: 360 ML | Refills: 11 | Status: SHIPPED | OUTPATIENT
Start: 2018-10-30 | End: 2019-10-31 | Stop reason: SDUPTHER

## 2018-10-30 ASSESSMENT — ENCOUNTER SYMPTOMS
DIARRHEA: 0
EYE PAIN: 0
EYE REDNESS: 0
SHORTNESS OF BREATH: 1
STRIDOR: 1
ABDOMINAL PAIN: 0
NAUSEA: 1
COUGH: 1
EYE ITCHING: 0
VOMITING: 0
WHEEZING: 1

## 2018-10-30 NOTE — PROGRESS NOTES
TIMES A DAY FOR COUGH 15 capsule 0    mirtazapine (REMERON) 30 MG tablet Take 0.5-1 tablets by mouth nightly As needed 30 tablet 11    guaiFENesin (MUCINEX) 600 MG extended release tablet TAKE ONE (1) TABLET BY MOUTH TWICE DAILY 60 tablet 11    cetirizine-psuedoephedrine (RA CETIRI-D) 5-120 MG per extended release tablet take 1 tablet by mouth twice a day 60 tablet 11    albuterol sulfate HFA (VENTOLIN HFA) 108 (90 Base) MCG/ACT inhaler Inhale 2 puffs into the lungs every 4 hours as needed for Wheezing 1 Inhaler 3    ATRIPLA 600-200-300 MG per tablet TAKE 1 TABLET BY MOUTH NIGHTLY 30 tablet 2    PARoxetine (PAXIL) 30 MG tablet Take 30 mg by mouth daily      risperiDONE (RISPERDAL) 3 MG tablet Take 3 mg by mouth 2 times daily      OXYGEN Inhale 2 L into the lungs nightly      aclidinium (TUDORZA PRESSAIR) 400 MCG/ACT AEPB inhaler inhale 1 puff INTO THE LUNGS twice a day 1 each 11    nicotine (NICODERM CQ) 21 MG/24HR Place 1 patch onto the skin every 24 hours       No current facility-administered medications for this visit. Facility-Administered Medications Ordered in Other Visits   Medication Dose Route Frequency Provider Last Rate Last Dose    0.9 % sodium chloride infusion   Intravenous Continuous Murlene Bethany, PA-C        sodium chloride flush 0.9 % injection 10 mL  10 mL Intravenous 2 times per day Murlene Bethany, PA-C        sodium chloride flush 0.9 % injection 10 mL  10 mL Intravenous PRN Murlene Bethany, PA-C         .    ROS   Review of Systems   Constitutional: Positive for chills, fatigue and fever. Negative for activity change and unexpected weight change. HENT: Positive for congestion. Eyes: Positive for visual disturbance (blurred vision with chest pain ). Negative for pain, redness and itching. Respiratory: Positive for cough (yellow/green), shortness of breath, wheezing and stridor.     Cardiovascular: Positive for chest pain (sharp pain left side of chest radiating Cognition and memory are normal. She expresses impulsivity. Nursing note and vitals reviewed. Test results   Lung Nodule Screening     [] Qualifies    [x] Does not qualify   [] Declined    [] Completed     no new testing today   Assessment      Diagnosis Orders   1. COPD with acute exacerbation ([facility]     2. Chronic obstructive pulmonary disease, unspecified COPD type ([facility]  ipratropium-albuterol (DUONEB) 0.5-2.5 (3) MG/3ML SOLN nebulizer solution   3. Cigarette nicotine dependence with other nicotine-induced disorder     4. Bipolar 1 disorder ([facility]           Plan   Instructed to call 911 if chest pain / numbness re-occurs   Needs to schedule appt with PCP ASAP to discuss C. P/ numbness/ dizziness symptoms to see if further workup is indicated.      Smoking cessation discussed, educated on dangerous/ risks associated with smoking (7 min)   Prednisone 40 mg PO x 5 days  Doxy x 10 days  Stop Albuterol nebs  New script for Duonebs 4 times daily  Continue PRN albuterol HFA  Stop Dulera  Start Symbicort 160/4.85 mcg 2 puffs BID- 2 samples given to patient in office today, instructed on proper use     Will see Latasha Robb back in: 2 months     Electronically signed by JAMEY Zhao CNP on 10/30/2018 at 3:58 PM  10/30/2018

## 2018-10-31 ENCOUNTER — TELEPHONE (OUTPATIENT)
Dept: FAMILY MEDICINE CLINIC | Age: 58
End: 2018-10-31

## 2018-10-31 ENCOUNTER — TELEPHONE (OUTPATIENT)
Dept: PULMONOLOGY | Age: 58
End: 2018-10-31

## 2018-10-31 NOTE — TELEPHONE ENCOUNTER
Home health nurse is calling, she is at [address] [place] [address] and Brianna Carlisle is telling her that when she was here yesterday that she told Connie about her chest pain and numbness and the patient states that Connie told her that she had a stroke but Connie did nothing about it. I relayed Connie's discharge instructions and there is no mention that Connie told her that she had a stroke. It does state that if patient has these symptoms again that she is to go to the ER. Also patient was told to call her PCP ASAP regarding these symptoms. Patient has not called her PCP yet.

## 2018-10-31 NOTE — TELEPHONE ENCOUNTER
10/31/18  FYI - per [facility], they are out for visit today and patient states she thinks she had a stroke last week. She also seen pulmonary 10/30/18 who agreed. Patient did not call anyone for this and no signs of stroke or heart attack today. Patient scheduled 11/1/18 with Sienna William.   Belinda/william  Dolv: 8/15/18

## 2018-11-01 ENCOUNTER — OFFICE VISIT (OUTPATIENT)
Dept: FAMILY MEDICINE CLINIC | Age: 58
End: 2018-11-01
Payer: COMMERCIAL

## 2018-11-01 VITALS
BODY MASS INDEX: 28.32 KG/M2 | RESPIRATION RATE: 16 BRPM | HEIGHT: 65 IN | WEIGHT: 170 LBS | HEART RATE: 80 BPM | DIASTOLIC BLOOD PRESSURE: 70 MMHG | SYSTOLIC BLOOD PRESSURE: 120 MMHG

## 2018-11-01 DIAGNOSIS — H57.04 DILATED PUPIL: ICD-10-CM

## 2018-11-01 DIAGNOSIS — R07.9 ACUTE CHEST PAIN: ICD-10-CM

## 2018-11-01 DIAGNOSIS — R29.90 STROKE-LIKE SYMPTOM: ICD-10-CM

## 2018-11-01 DIAGNOSIS — R51.9 ACUTE NONINTRACTABLE HEADACHE, UNSPECIFIED HEADACHE TYPE: Primary | ICD-10-CM

## 2018-11-01 PROCEDURE — 3017F COLORECTAL CA SCREEN DOC REV: CPT | Performed by: FAMILY MEDICINE

## 2018-11-01 PROCEDURE — G8598 ASA/ANTIPLAT THER USED: HCPCS | Performed by: FAMILY MEDICINE

## 2018-11-01 PROCEDURE — G8427 DOCREV CUR MEDS BY ELIG CLIN: HCPCS | Performed by: FAMILY MEDICINE

## 2018-11-01 PROCEDURE — 99214 OFFICE O/P EST MOD 30 MIN: CPT | Performed by: FAMILY MEDICINE

## 2018-11-01 PROCEDURE — G8484 FLU IMMUNIZE NO ADMIN: HCPCS | Performed by: FAMILY MEDICINE

## 2018-11-01 PROCEDURE — 4004F PT TOBACCO SCREEN RCVD TLK: CPT | Performed by: FAMILY MEDICINE

## 2018-11-01 PROCEDURE — G8417 CALC BMI ABV UP PARAM F/U: HCPCS | Performed by: FAMILY MEDICINE

## 2018-11-05 ASSESSMENT — ENCOUNTER SYMPTOMS
GASTROINTESTINAL NEGATIVE: 1
PHOTOPHOBIA: 0
RESPIRATORY NEGATIVE: 1

## 2018-11-13 ENCOUNTER — HOSPITAL ENCOUNTER (OUTPATIENT)
Dept: SLEEP CENTER | Age: 58
Discharge: HOME | End: 2018-11-15
Payer: COMMERCIAL

## 2018-11-13 DIAGNOSIS — J44.9 CHRONIC OBSTRUCTIVE PULMONARY DISEASE, UNSPECIFIED COPD TYPE (HCC): ICD-10-CM

## 2018-11-13 DIAGNOSIS — G47.33 OSA (OBSTRUCTIVE SLEEP APNEA): ICD-10-CM

## 2018-11-13 DIAGNOSIS — J96.12 CHRONIC RESPIRATORY FAILURE WITH HYPERCAPNIA (HCC): ICD-10-CM

## 2018-11-13 PROCEDURE — 95811 POLYSOM 6/>YRS CPAP 4/> PARM: CPT

## 2018-11-14 ENCOUNTER — HOSPITAL ENCOUNTER (OUTPATIENT)
Age: 58
Setting detail: OBSERVATION
Discharge: HOME | End: 2018-11-15
Attending: INTERNAL MEDICINE | Admitting: INTERNAL MEDICINE
Payer: COMMERCIAL

## 2018-11-14 ENCOUNTER — APPOINTMENT (OUTPATIENT)
Dept: GENERAL RADIOLOGY | Age: 58
End: 2018-11-14
Payer: COMMERCIAL

## 2018-11-14 ENCOUNTER — APPOINTMENT (OUTPATIENT)
Dept: CT IMAGING | Age: 58
End: 2018-11-14
Payer: COMMERCIAL

## 2018-11-14 ENCOUNTER — APPOINTMENT (OUTPATIENT)
Dept: MRI IMAGING | Age: 58
End: 2018-11-14
Payer: COMMERCIAL

## 2018-11-14 DIAGNOSIS — G45.9 TIA (TRANSIENT ISCHEMIC ATTACK): Primary | ICD-10-CM

## 2018-11-14 PROBLEM — R20.0 LEFT FACIAL NUMBNESS: Status: ACTIVE | Noted: 2018-11-14

## 2018-11-14 LAB
AMPHETAMINE+METHAMPHETAMINE URINE SCREEN: POSITIVE
ANION GAP SERPL CALCULATED.3IONS-SCNC: 11 MEQ/L (ref 8–16)
BARBITURATE QUANTITATIVE URINE: NEGATIVE
BASOPHILS # BLD: 1.2 %
BASOPHILS ABSOLUTE: 0.1 THOU/MM3 (ref 0–0.1)
BENZODIAZEPINE QUANTITATIVE URINE: NEGATIVE
BILIRUBIN URINE: NEGATIVE
BLOOD, URINE: NEGATIVE
BUN BLDV-MCNC: 18 MG/DL (ref 7–22)
CALCIUM SERPL-MCNC: 9 MG/DL (ref 8.5–10.5)
CANNABINOID QUANTITATIVE URINE: NEGATIVE
CHARACTER, URINE: CLEAR
CHLORIDE BLD-SCNC: 102 MEQ/L (ref 98–111)
CO2: 23 MEQ/L (ref 23–33)
COCAINE METABOLITE QUANTITATIVE URINE: NEGATIVE
COLOR: YELLOW
CREAT SERPL-MCNC: 0.6 MG/DL (ref 0.4–1.2)
EKG ATRIAL RATE: 74 BPM
EKG P AXIS: 79 DEGREES
EKG P-R INTERVAL: 166 MS
EKG Q-T INTERVAL: 418 MS
EKG QRS DURATION: 94 MS
EKG QTC CALCULATION (BAZETT): 463 MS
EKG R AXIS: 67 DEGREES
EKG T AXIS: 70 DEGREES
EKG VENTRICULAR RATE: 74 BPM
EOSINOPHIL # BLD: 2.5 %
EOSINOPHILS ABSOLUTE: 0.2 THOU/MM3 (ref 0–0.4)
ERYTHROCYTE [DISTWIDTH] IN BLOOD BY AUTOMATED COUNT: 14.1 % (ref 11.5–14.5)
ERYTHROCYTE [DISTWIDTH] IN BLOOD BY AUTOMATED COUNT: 50.5 FL (ref 35–45)
FLU A ANTIGEN: NEGATIVE
FLU B ANTIGEN: NEGATIVE
FOLATE: > 20 NG/ML (ref 4.8–24.2)
GFR SERPL CREATININE-BSD FRML MDRD: > 90 ML/MIN/1.73M2
GLUCOSE BLD-MCNC: 101 MG/DL (ref 70–108)
GLUCOSE BLD-MCNC: 112 MG/DL (ref 70–108)
GLUCOSE URINE: NEGATIVE MG/DL
HCT VFR BLD CALC: 40.7 % (ref 37–47)
HEMOGLOBIN: 13.6 GM/DL (ref 12–16)
IMMATURE GRANS (ABS): 0.03 THOU/MM3 (ref 0–0.07)
IMMATURE GRANULOCYTES: 0.5 %
KETONES, URINE: NEGATIVE
LEUKOCYTE ESTERASE, URINE: NEGATIVE
LYMPHOCYTES # BLD: 35.2 %
LYMPHOCYTES ABSOLUTE: 2.3 THOU/MM3 (ref 1–4.8)
MCH RBC QN AUTO: 32.7 PG (ref 26–33)
MCHC RBC AUTO-ENTMCNC: 33.4 GM/DL (ref 32.2–35.5)
MCV RBC AUTO: 97.8 FL (ref 81–99)
MONOCYTES # BLD: 7.6 %
MONOCYTES ABSOLUTE: 0.5 THOU/MM3 (ref 0.4–1.3)
NITRITE, URINE: NEGATIVE
NUCLEATED RED BLOOD CELLS: 0 /100 WBC
OPIATES, URINE: NEGATIVE
OSMOLALITY CALCULATION: 274 MOSMOL/KG (ref 275–300)
OXYCODONE: NEGATIVE
PH UA: 6
PHENCYCLIDINE QUANTITATIVE URINE: NEGATIVE
PLATELET # BLD: 255 THOU/MM3 (ref 130–400)
PMV BLD AUTO: 9.9 FL (ref 9.4–12.4)
POTASSIUM SERPL-SCNC: 4.6 MEQ/L (ref 3.5–5.2)
PROTEIN UA: NEGATIVE
RBC # BLD: 4.16 MILL/MM3 (ref 4.2–5.4)
SEG NEUTROPHILS: 53 %
SEGMENTED NEUTROPHILS ABSOLUTE COUNT: 3.4 THOU/MM3 (ref 1.8–7.7)
SODIUM BLD-SCNC: 136 MEQ/L (ref 135–145)
SPECIFIC GRAVITY, URINE: 1.02 (ref 1–1.03)
TROPONIN T: < 0.01 NG/ML
TSH SERPL DL<=0.05 MIU/L-ACNC: 3.21 UIU/ML (ref 0.4–4.2)
UROBILINOGEN, URINE: 0.2 EU/DL
VITAMIN B-12: 225 PG/ML (ref 211–911)
VITAMIN D 25-HYDROXY: 30 NG/ML (ref 30–100)
WBC # BLD: 6.4 THOU/MM3 (ref 4.8–10.8)

## 2018-11-14 PROCEDURE — G0378 HOSPITAL OBSERVATION PER HR: HCPCS

## 2018-11-14 PROCEDURE — 6370000000 HC RX 637 (ALT 250 FOR IP): Performed by: INTERNAL MEDICINE

## 2018-11-14 PROCEDURE — 96372 THER/PROPH/DIAG INJ SC/IM: CPT

## 2018-11-14 PROCEDURE — 6370000000 HC RX 637 (ALT 250 FOR IP): Performed by: PHYSICIAN ASSISTANT

## 2018-11-14 PROCEDURE — 70450 CT HEAD/BRAIN W/O DYE: CPT

## 2018-11-14 PROCEDURE — 82948 REAGENT STRIP/BLOOD GLUCOSE: CPT

## 2018-11-14 PROCEDURE — 6360000002 HC RX W HCPCS: Performed by: INTERNAL MEDICINE

## 2018-11-14 PROCEDURE — 87804 INFLUENZA ASSAY W/OPTIC: CPT

## 2018-11-14 PROCEDURE — 36415 COLL VENOUS BLD VENIPUNCTURE: CPT

## 2018-11-14 PROCEDURE — 84484 ASSAY OF TROPONIN QUANT: CPT

## 2018-11-14 PROCEDURE — 85025 COMPLETE CBC W/AUTO DIFF WBC: CPT

## 2018-11-14 PROCEDURE — 93005 ELECTROCARDIOGRAM TRACING: CPT | Performed by: PHYSICIAN ASSISTANT

## 2018-11-14 PROCEDURE — 99219 PR INITIAL OBSERVATION CARE/DAY 50 MINUTES: CPT | Performed by: INTERNAL MEDICINE

## 2018-11-14 PROCEDURE — 80307 DRUG TEST PRSMV CHEM ANLYZR: CPT

## 2018-11-14 PROCEDURE — 6360000004 HC RX CONTRAST MEDICATION: Performed by: PHYSICIAN ASSISTANT

## 2018-11-14 PROCEDURE — 84443 ASSAY THYROID STIM HORMONE: CPT

## 2018-11-14 PROCEDURE — 99285 EMERGENCY DEPT VISIT HI MDM: CPT

## 2018-11-14 PROCEDURE — 80048 BASIC METABOLIC PNL TOTAL CA: CPT

## 2018-11-14 PROCEDURE — 94640 AIRWAY INHALATION TREATMENT: CPT

## 2018-11-14 PROCEDURE — 70496 CT ANGIOGRAPHY HEAD: CPT

## 2018-11-14 PROCEDURE — 99220 PR INITIAL OBSERVATION CARE/DAY 70 MINUTES: CPT | Performed by: PSYCHIATRY & NEUROLOGY

## 2018-11-14 PROCEDURE — 2580000003 HC RX 258: Performed by: INTERNAL MEDICINE

## 2018-11-14 PROCEDURE — 82746 ASSAY OF FOLIC ACID SERUM: CPT

## 2018-11-14 PROCEDURE — 70551 MRI BRAIN STEM W/O DYE: CPT

## 2018-11-14 PROCEDURE — 71045 X-RAY EXAM CHEST 1 VIEW: CPT

## 2018-11-14 PROCEDURE — 2709999900 HC NON-CHARGEABLE SUPPLY

## 2018-11-14 PROCEDURE — 82607 VITAMIN B-12: CPT

## 2018-11-14 PROCEDURE — 82306 VITAMIN D 25 HYDROXY: CPT

## 2018-11-14 PROCEDURE — 81003 URINALYSIS AUTO W/O SCOPE: CPT

## 2018-11-14 PROCEDURE — 70498 CT ANGIOGRAPHY NECK: CPT

## 2018-11-14 PROCEDURE — 93010 ELECTROCARDIOGRAM REPORT: CPT | Performed by: INTERNAL MEDICINE

## 2018-11-14 RX ORDER — IBUPROFEN 800 MG/1
800 TABLET ORAL EVERY 8 HOURS PRN
Status: DISCONTINUED | OUTPATIENT
Start: 2018-11-14 | End: 2018-11-15 | Stop reason: HOSPADM

## 2018-11-14 RX ORDER — ALBUTEROL SULFATE 2.5 MG/3ML
SOLUTION RESPIRATORY (INHALATION)
Status: DISPENSED
Start: 2018-11-14 | End: 2018-11-14

## 2018-11-14 RX ORDER — SODIUM CHLORIDE 0.9 % (FLUSH) 0.9 %
10 SYRINGE (ML) INJECTION PRN
Status: DISCONTINUED | OUTPATIENT
Start: 2018-11-14 | End: 2018-11-15 | Stop reason: HOSPADM

## 2018-11-14 RX ORDER — PSEUDOEPHEDRINE HCL 120 MG/1
120 TABLET, FILM COATED, EXTENDED RELEASE ORAL 2 TIMES DAILY
Status: DISCONTINUED | OUTPATIENT
Start: 2018-11-14 | End: 2018-11-15 | Stop reason: HOSPADM

## 2018-11-14 RX ORDER — MIRTAZAPINE 30 MG/1
30 TABLET, FILM COATED ORAL NIGHTLY
Status: DISCONTINUED | OUTPATIENT
Start: 2018-11-14 | End: 2018-11-15 | Stop reason: HOSPADM

## 2018-11-14 RX ORDER — NICOTINE 21 MG/24HR
1 PATCH, TRANSDERMAL 24 HOURS TRANSDERMAL EVERY 24 HOURS
Status: DISCONTINUED | OUTPATIENT
Start: 2018-11-14 | End: 2018-11-15 | Stop reason: HOSPADM

## 2018-11-14 RX ORDER — PAROXETINE 30 MG/1
30 TABLET, FILM COATED ORAL DAILY
Status: DISCONTINUED | OUTPATIENT
Start: 2018-11-14 | End: 2018-11-15 | Stop reason: HOSPADM

## 2018-11-14 RX ORDER — POTASSIUM CHLORIDE 20MEQ/15ML
40 LIQUID (ML) ORAL PRN
Status: DISCONTINUED | OUTPATIENT
Start: 2018-11-14 | End: 2018-11-15 | Stop reason: HOSPADM

## 2018-11-14 RX ORDER — IPRATROPIUM BROMIDE AND ALBUTEROL SULFATE 2.5; .5 MG/3ML; MG/3ML
1 SOLUTION RESPIRATORY (INHALATION) 4 TIMES DAILY
Status: DISCONTINUED | OUTPATIENT
Start: 2018-11-14 | End: 2018-11-15 | Stop reason: HOSPADM

## 2018-11-14 RX ORDER — ALBUTEROL SULFATE 90 UG/1
2 AEROSOL, METERED RESPIRATORY (INHALATION) EVERY 4 HOURS PRN
Status: DISCONTINUED | OUTPATIENT
Start: 2018-11-14 | End: 2018-11-14 | Stop reason: SDUPTHER

## 2018-11-14 RX ORDER — SODIUM CHLORIDE 0.9 % (FLUSH) 0.9 %
10 SYRINGE (ML) INJECTION EVERY 12 HOURS SCHEDULED
Status: DISCONTINUED | OUTPATIENT
Start: 2018-11-14 | End: 2018-11-15 | Stop reason: HOSPADM

## 2018-11-14 RX ORDER — ONDANSETRON 2 MG/ML
4 INJECTION INTRAMUSCULAR; INTRAVENOUS EVERY 4 HOURS PRN
Status: DISCONTINUED | OUTPATIENT
Start: 2018-11-14 | End: 2018-11-15 | Stop reason: HOSPADM

## 2018-11-14 RX ORDER — POTASSIUM CHLORIDE 7.45 MG/ML
10 INJECTION INTRAVENOUS PRN
Status: DISCONTINUED | OUTPATIENT
Start: 2018-11-14 | End: 2018-11-15 | Stop reason: HOSPADM

## 2018-11-14 RX ORDER — TRAZODONE HYDROCHLORIDE 100 MG/1
100 TABLET ORAL NIGHTLY
Status: DISCONTINUED | OUTPATIENT
Start: 2018-11-14 | End: 2018-11-15 | Stop reason: HOSPADM

## 2018-11-14 RX ORDER — BENZONATATE 100 MG/1
100 CAPSULE ORAL 3 TIMES DAILY
Status: DISCONTINUED | OUTPATIENT
Start: 2018-11-14 | End: 2018-11-15 | Stop reason: HOSPADM

## 2018-11-14 RX ORDER — CYANOCOBALAMIN 1000 UG/ML
1000 INJECTION INTRAMUSCULAR; SUBCUTANEOUS DAILY
Status: DISCONTINUED | OUTPATIENT
Start: 2018-11-14 | End: 2018-11-15

## 2018-11-14 RX ORDER — CHOLECALCIFEROL (VITAMIN D3) 125 MCG
500 CAPSULE ORAL DAILY
Qty: 30 TABLET | Refills: 3 | Status: ON HOLD | OUTPATIENT
Start: 2018-11-14 | End: 2019-06-30

## 2018-11-14 RX ORDER — BUPROPION HYDROCHLORIDE 100 MG/1
100 TABLET ORAL 2 TIMES DAILY
Status: DISCONTINUED | OUTPATIENT
Start: 2018-11-14 | End: 2018-11-15 | Stop reason: HOSPADM

## 2018-11-14 RX ORDER — CETIRIZINE HYDROCHLORIDE 5 MG/1
5 TABLET ORAL 2 TIMES DAILY
Status: DISCONTINUED | OUTPATIENT
Start: 2018-11-14 | End: 2018-11-15 | Stop reason: HOSPADM

## 2018-11-14 RX ORDER — PANTOPRAZOLE SODIUM 40 MG/1
40 TABLET, DELAYED RELEASE ORAL
Status: DISCONTINUED | OUTPATIENT
Start: 2018-11-15 | End: 2018-11-15 | Stop reason: HOSPADM

## 2018-11-14 RX ORDER — CETIRIZINE HYDROCHLORIDE, PSEUDOEPHEDRINE HYDROCHLORIDE 5; 120 MG/1; MG/1
1 TABLET, FILM COATED, EXTENDED RELEASE ORAL 2 TIMES DAILY
Status: DISCONTINUED | OUTPATIENT
Start: 2018-11-14 | End: 2018-11-14 | Stop reason: CLARIF

## 2018-11-14 RX ORDER — EFAVIRENZ, EMTRICITABINE AND TENOFOVIR DISOPROXIL FUMARATE 600; 200; 300 MG/1; MG/1; MG/1
1 TABLET, FILM COATED ORAL NIGHTLY
Status: DISCONTINUED | OUTPATIENT
Start: 2018-11-14 | End: 2018-11-15 | Stop reason: HOSPADM

## 2018-11-14 RX ORDER — ASPIRIN 81 MG/1
81 TABLET ORAL DAILY
Status: DISCONTINUED | OUTPATIENT
Start: 2018-11-14 | End: 2018-11-15 | Stop reason: HOSPADM

## 2018-11-14 RX ORDER — ALBUTEROL SULFATE 90 UG/1
2 AEROSOL, METERED RESPIRATORY (INHALATION) EVERY 6 HOURS PRN
Status: DISCONTINUED | OUTPATIENT
Start: 2018-11-14 | End: 2018-11-15 | Stop reason: HOSPADM

## 2018-11-14 RX ORDER — RISPERIDONE 3 MG/1
3 TABLET, FILM COATED ORAL 2 TIMES DAILY
Status: DISCONTINUED | OUTPATIENT
Start: 2018-11-14 | End: 2018-11-15 | Stop reason: HOSPADM

## 2018-11-14 RX ORDER — MULTIVITAMIN WITH FOLIC ACID 400 MCG
1 TABLET ORAL DAILY
Status: DISCONTINUED | OUTPATIENT
Start: 2018-11-14 | End: 2018-11-15 | Stop reason: HOSPADM

## 2018-11-14 RX ORDER — POTASSIUM CHLORIDE 20 MEQ/1
40 TABLET, EXTENDED RELEASE ORAL PRN
Status: DISCONTINUED | OUTPATIENT
Start: 2018-11-14 | End: 2018-11-15 | Stop reason: HOSPADM

## 2018-11-14 RX ORDER — BENZONATATE 100 MG/1
200 CAPSULE ORAL ONCE
Status: COMPLETED | OUTPATIENT
Start: 2018-11-14 | End: 2018-11-14

## 2018-11-14 RX ADMIN — CETIRIZINE HYDROCHLORIDE 5 MG: 5 TABLET ORAL at 12:47

## 2018-11-14 RX ADMIN — EFAVIRENZ, EMTRICITABINE, AND TENOFOVIR DISOPROXIL FUMARATE 1 TABLET: 600; 200; 300 TABLET, FILM COATED ORAL at 20:33

## 2018-11-14 RX ADMIN — BUPROPION HYDROCHLORIDE 100 MG: 100 TABLET, FILM COATED ORAL at 20:30

## 2018-11-14 RX ADMIN — BENZONATATE 100 MG: 100 CAPSULE ORAL at 20:32

## 2018-11-14 RX ADMIN — Medication 10 ML: at 12:48

## 2018-11-14 RX ADMIN — THERA TABS 1 TABLET: TAB at 14:30

## 2018-11-14 RX ADMIN — ENOXAPARIN SODIUM 40 MG: 40 INJECTION SUBCUTANEOUS at 12:48

## 2018-11-14 RX ADMIN — RISPERIDONE 3 MG: 3 TABLET ORAL at 20:31

## 2018-11-14 RX ADMIN — CETIRIZINE HYDROCHLORIDE 5 MG: 5 TABLET ORAL at 20:31

## 2018-11-14 RX ADMIN — ASPIRIN 81 MG: 81 TABLET, COATED ORAL at 12:48

## 2018-11-14 RX ADMIN — PSEUDOEPHEDRINE HYDROCHLORIDE 120 MG: 120 TABLET, FILM COATED, EXTENDED RELEASE ORAL at 12:48

## 2018-11-14 RX ADMIN — IBUPROFEN 800 MG: 800 TABLET ORAL at 12:47

## 2018-11-14 RX ADMIN — MIRTAZAPINE 30 MG: 30 TABLET, FILM COATED ORAL at 20:30

## 2018-11-14 RX ADMIN — PSEUDOEPHEDRINE HYDROCHLORIDE 120 MG: 120 TABLET, FILM COATED, EXTENDED RELEASE ORAL at 20:33

## 2018-11-14 RX ADMIN — BENZONATATE 100 MG: 100 CAPSULE ORAL at 14:30

## 2018-11-14 RX ADMIN — PAROXETINE HYDROCHLORIDE 30 MG: 30 TABLET, FILM COATED ORAL at 14:31

## 2018-11-14 RX ADMIN — CYANOCOBALAMIN 1000 MCG: 1000 INJECTION, SOLUTION INTRAMUSCULAR at 16:35

## 2018-11-14 RX ADMIN — TRAZODONE HYDROCHLORIDE 100 MG: 100 TABLET ORAL at 20:32

## 2018-11-14 RX ADMIN — IOPAMIDOL 80 ML: 755 INJECTION, SOLUTION INTRAVENOUS at 07:44

## 2018-11-14 RX ADMIN — Medication 10 ML: at 20:34

## 2018-11-14 RX ADMIN — LURASIDONE HYDROCHLORIDE 40 MG: 40 TABLET, FILM COATED ORAL at 14:31

## 2018-11-14 RX ADMIN — IPRATROPIUM BROMIDE AND ALBUTEROL SULFATE 3 ML: .5; 3 SOLUTION RESPIRATORY (INHALATION) at 18:38

## 2018-11-14 RX ADMIN — BENZONATATE 200 MG: 100 CAPSULE ORAL at 06:41

## 2018-11-14 ASSESSMENT — PAIN DESCRIPTION - ORIENTATION
ORIENTATION_2: RIGHT;LEFT
ORIENTATION: RIGHT;LEFT
ORIENTATION_2: RIGHT;LEFT
ORIENTATION_2: RIGHT;LEFT
ORIENTATION: ANTERIOR
ORIENTATION: ANTERIOR
ORIENTATION_2: RIGHT;LEFT
ORIENTATION: LEFT;OTHER (COMMENT)
ORIENTATION: LEFT
ORIENTATION: LEFT;RIGHT
ORIENTATION_2: LEFT;RIGHT
ORIENTATION: RIGHT;LEFT

## 2018-11-14 ASSESSMENT — PAIN DESCRIPTION - LOCATION
LOCATION_2: FOOT
LOCATION: HEAD
LOCATION: FOOT
LOCATION_2: FOOT
LOCATION: FOOT
LOCATION: HEAD
LOCATION: GENERALIZED
LOCATION: HEAD
LOCATION: FOOT
LOCATION_2: FOOT
LOCATION: HEAD
LOCATION_2: FOOT
LOCATION: HEAD
LOCATION_2: FOOT
LOCATION: HEAD

## 2018-11-14 ASSESSMENT — ENCOUNTER SYMPTOMS
SHORTNESS OF BREATH: 0
EYE DISCHARGE: 0
SORE THROAT: 0
VOMITING: 0
DIARRHEA: 0
WHEEZING: 0
RHINORRHEA: 0
NAUSEA: 0
BACK PAIN: 0
COUGH: 0
EYE PAIN: 0
ABDOMINAL PAIN: 0
TROUBLE SWALLOWING: 0

## 2018-11-14 ASSESSMENT — PAIN DESCRIPTION - INTENSITY
RATING_2: 5

## 2018-11-14 ASSESSMENT — PAIN DESCRIPTION - PAIN TYPE
TYPE: CHRONIC PAIN
TYPE: ACUTE PAIN
TYPE_2: CHRONIC PAIN
TYPE_2: CHRONIC PAIN
TYPE: ACUTE PAIN
TYPE: ACUTE PAIN
TYPE_2: CHRONIC PAIN
TYPE: ACUTE PAIN
TYPE: CHRONIC PAIN
TYPE: ACUTE PAIN
TYPE: CHRONIC PAIN
TYPE_2: CHRONIC PAIN
TYPE_2: CHRONIC PAIN

## 2018-11-14 ASSESSMENT — PAIN DESCRIPTION - DESCRIPTORS
DESCRIPTORS: HEADACHE
DESCRIPTORS_2: ACHING
DESCRIPTORS: ACHING
DESCRIPTORS_2: ACHING
DESCRIPTORS: HEADACHE

## 2018-11-14 ASSESSMENT — PAIN SCALES - GENERAL
PAINLEVEL_OUTOF10: 5
PAINLEVEL_OUTOF10: 6
PAINLEVEL_OUTOF10: 5
PAINLEVEL_OUTOF10: 5
PAINLEVEL_OUTOF10: 6
PAINLEVEL_OUTOF10: 5
PAINLEVEL_OUTOF10: 0
PAINLEVEL_OUTOF10: 10
PAINLEVEL_OUTOF10: 8
PAINLEVEL_OUTOF10: 0
PAINLEVEL_OUTOF10: 0
PAINLEVEL_OUTOF10: 6

## 2018-11-14 ASSESSMENT — PAIN DESCRIPTION - FREQUENCY
FREQUENCY: INTERMITTENT
FREQUENCY: INTERMITTENT

## 2018-11-14 ASSESSMENT — PAIN DESCRIPTION - DURATION
DURATION_2: CONTINUOUS

## 2018-11-14 NOTE — PROGRESS NOTES
Introduced self to patient and discussed plan for the day, including assessments, medication administration, and daily cares. Patient agreeable to this plan. Patient sitting on edge of bed at this time. Respirations easy and unlabored. Patient denies pain and states her headache was relieved with medication and food intake. Call light within reach. No other needs expressed at this time.

## 2018-11-14 NOTE — PROGRESS NOTES
Hospitalist note  Vitamin B12 supplementation started on patient as Vitamin B12 low. No evidence of acute stroke.     Electronically signed by Mark Mock MD on 11/14/18 at 6:58 PM

## 2018-11-14 NOTE — PROGRESS NOTES
Send out labs ordered but send outs are already gone for the day per . Send outs will be drawn tomorrow AM with morning labs.

## 2018-11-14 NOTE — ED PROVIDER NOTES
[facility]  eMERGENCY dEPARTMENT eNCOUnter          CHIEF COMPLAINT       Chief Complaint   Patient presents with    Generalized Body Aches    Chest Pain       Nurses Notes reviewed and I agree except as noted inthe HPI. HISTORY OF PRESENT ILLNESS    Haseeb Gonzalez is a 62 y.o. female with a history of CAD, anxiety, arthritis, cancer, COPD, CKD, HIV and hyperlipidemia who presents to the Emergency Department for the evaluation of memory loss over the past few years. The patient states her memory has progressively been getting worse. She states she is forgetting simple things like her pills or seeing doctors. Patient states home health comes every week and sets up her pills for her. Patient states she had stroke like symptoms 2 weeks ago. She states the left side of her face felt numb and she was unable to open her mouth. She also had a headache and her vision was blurred bilaterally. Patient states symptoms lasted only a few minutes and she felt a better after she got up from a nap. Patient denies having extremity numbness or weakness or unsteady gait at that time or now. She denies experiencing any of those symptoms since the episode. Patient states she also had left sided neck pain that radiated to her chest and it caused the left side of her neck up to her forehead with the numbness as well as sensation of facial droop. She denies trouble swallowing, speaking. No further complaints at the time of the initial encounter. The HPI was provided by the patient. REVIEW OF SYSTEMS     Review of Systems   Constitutional: Negative for appetite change, chills, fatigue and fever. HENT: Negative for congestion, ear pain, rhinorrhea, sore throat and trouble swallowing. Eyes: Negative for pain, discharge and visual disturbance. Respiratory: Negative for cough, shortness of breath and wheezing. Cardiovascular: Negative for chest pain, palpitations and leg swelling.    Gastrointestinal: puffs into the lungs 2 times daily Rinse mouth after its use. BUPROPION (WELLBUTRIN) 100 MG TABLET    Take 100 mg by mouth 2 times daily    CETIRIZINE-PSUEDOEPHEDRINE (RA CETIRI-D) 5-120 MG PER EXTENDED RELEASE TABLET    take 1 tablet by mouth twice a day    GUAIFENESIN (MUCINEX) 600 MG EXTENDED RELEASE TABLET    TAKE ONE (1) TABLET BY MOUTH TWICE DAILY    IBUPROFEN (IBU) 800 MG TABLET    Take 1 tablet by mouth every 8 hours as needed for Pain Take with food. IPRATROPIUM-ALBUTEROL (DUONEB) 0.5-2.5 (3) MG/3ML SOLN NEBULIZER SOLUTION    Inhale 3 mLs into the lungs 4 times daily    LURASIDONE (LATUDA) 40 MG TABS TABLET    Take 40 mg by mouth daily    MIRTAZAPINE (REMERON) 30 MG TABLET    Take 0.5-1 tablets by mouth nightly As needed    MULTIPLE VITAMIN (MULTI VITAMIN DAILY PO)    Take by mouth    NICOTINE (NICODERM CQ) 21 MG/24HR    Place 1 patch onto the skin every 24 hours    OMEPRAZOLE (PRILOSEC) 40 MG DELAYED RELEASE CAPSULE    Take 1 capsule by mouth 2 times daily    OXYGEN    Inhale 2 L into the lungs nightly    PAROXETINE (PAXIL) 30 MG TABLET    Take 30 mg by mouth daily    RISPERIDONE (RISPERDAL) 3 MG TABLET    Take 3 mg by mouth 2 times daily    TRAZODONE HCL PO    Take by mouth       ALLERGIES     is allergic to aspirin; biaxin [clarithromycin]; levofloxacin; and vancomycin. FAMILY HISTORY     indicated that her mother is . She indicated that her father is . family history includes Asthma in her father and mother; Royce Servant in her father; Heart Disease in her mother; Uterine Cancer in her mother. SOCIAL HISTORY      reports that she has been smoking Cigarettes. She started smoking about 44 years ago. She has a 19.50 pack-year smoking history. She has never used smokeless tobacco. She reports that she does not drink alcohol or use drugs. PHYSICAL EXAM     INITIAL VITALS:  height is 5' 5\" (1.651 m) and weight is 170 lb (77.1 kg). Her oral temperature is 98.5 °F (36.9 °C).

## 2018-11-14 NOTE — PROGRESS NOTES
Patient admitted from ER at this time. Patient oriented to room and call light system. Patient states she has headache on left side of forehead, 8/10. Informed her that she has ibuprofen ordered for pain. She states she would like Vicodin instead. Offered ibuprofen again since that is all that is available at this time. Patient declined offer.
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
## 2018-11-14 NOTE — PLAN OF CARE
Problem: DISCHARGE BARRIERS  Goal: Patient's continuum of care needs are met  Outcome: Ongoing  Return home with current services. See SW progress notes.

## 2018-11-14 NOTE — H&P
use No    Drug use: No    Sexual activity: Not Asked     Other Topics Concern    None     Social History Narrative    None         TOBACCO:   reports that she has been smoking Cigarettes. She started smoking about 44 years ago. She has a 19.50 pack-year smoking history. She has never used smokeless tobacco.  ETOH:   reports that she does not drink alcohol. Review of systems     Pertinent positives as noted in the HPI. All other systemsreviewed and negative. Review of Systems      Physical examination     /61   Pulse 76   Temp 98.5 °F (36.9 °C) (Oral)   Resp 26   Ht 5' 5\" (1.651 m)   Wt 170 lb (77.1 kg)   SpO2 95%   BMI 28.29 kg/m²     General appearance:  No apparent distress. On entering the room today, the patient was pacing around the room. HEENT: Normocephalic. Pupils equal, round, and reactive to light. Extraocular motion intact. Conjunctivae clear. Nose symmetric without evidence of discharge. Oral mucosa moist without erythema or exudate. Neck: Supple. NoJVD. No carotid bruits. Trachea midline. No thyromegaly. Cardiovascular:  Regular rate and rhythm with normal S1/S2. No murmurs, rubs or gallops. Respiratory: Diffuse wheezing. Tachypneic. Intermittent coarse rhonchi. Positive cough. Abdomen: Soft, non-tender, non-distended with normal bowel sounds. Musculoskeletal:  Full range of motion without deformity. Neurologic:  No focal motor deficits. Reported sensory deficit to light touch at the left face. Cranial nerves: II-XII intact. Cerebellar function unremarkable- patient does not appear ataxic with ambulation. Lymphatic: No cervical lymphadenopathy. Psychiatric:  Alert and oriented. Vascular: Dorsalis pedis and radial pulses palpable bilaterally 2+. No peripheral edema. Genitourinary: Deferred. Skin: No visible rashes or lesions.       Labs and imaging     Recent Labs      11/14/18   0555   WBC  6.4   HGB  13.6   HCT  40.7   PLT  255     Recent Labs aneurysmal dilation. There is no stenosis at the origin of innominate artery, left common carotid artery or either subclavian artery. Right common carotid artery/ICA: The right common carotid artery is normal. The right carotid bulb is normal. The right internal carotid artery is normal. There is no stenosis. Left common carotid artery/ICA: The left common carotid artery is normal. There is mild atherosclerosis of the left carotid bulb. There is no associated stenosis. There is no stenosis of the left internal carotid artery. Vertebral arteries: The vertebral arteries are normal. The vertebral arteries are codominant. CTA HEAD: Internal carotid arteries: Normal. The ophthalmic artery origins are also normal. Middle cerebral arteries: Normal. The proximal branches are also normal. Anterior cerebral arteries: Normal. The proximal branches are normal. There is a normal small anterior communicating artery. Vertebral arteries: The vertebral arteries are normal. Basilar artery: Normal. Superior cerebellar arteries: Normal. Posterior cerebral arteries: Normal. The proximal branches are also normal. No aneurysms, stenoses or occlusions are noted. The superior sagittal sinus, vein of Raymundo, internal cerebral veins, straight sinus, transverse sinuses and sigmoid sinuses are patent. Axial source data: There are mild emphysematous changes in the lung apices. There is no cervical adenopathy. There are no gross abnormalities in the brain. 1. Normal CTA of the head. 2. No stenosis or atherosclerosis of the right carotid system. 3. Minimal atherosclerosis of the left carotid bulb. There is no stenosis. **This report has been created using voice recognition software. It may contain minor errors which are inherent in voice recognition technology. ** Final report electronically signed by Dr. Will Sanz on 11/14/2018 8:55 AM    Ct Head Wo Contrast    Result Date: 11/14/2018  PROCEDURE: CT HEAD WO CONTRAST CLINICAL INFORMATION:

## 2018-11-15 VITALS
SYSTOLIC BLOOD PRESSURE: 128 MMHG | TEMPERATURE: 98 F | HEART RATE: 95 BPM | HEIGHT: 65 IN | OXYGEN SATURATION: 93 % | WEIGHT: 174.8 LBS | DIASTOLIC BLOOD PRESSURE: 60 MMHG | BODY MASS INDEX: 29.12 KG/M2 | RESPIRATION RATE: 16 BRPM

## 2018-11-15 LAB
ANION GAP SERPL CALCULATED.3IONS-SCNC: 12 MEQ/L (ref 8–16)
BUN BLDV-MCNC: 12 MG/DL (ref 7–22)
CALCIUM SERPL-MCNC: 9 MG/DL (ref 8.5–10.5)
CHLORIDE BLD-SCNC: 96 MEQ/L (ref 98–111)
CHOLESTEROL, TOTAL: 213 MG/DL (ref 100–199)
CO2: 27 MEQ/L (ref 23–33)
CREAT SERPL-MCNC: 0.6 MG/DL (ref 0.4–1.2)
ERYTHROCYTE [DISTWIDTH] IN BLOOD BY AUTOMATED COUNT: 13.6 % (ref 11.5–14.5)
ERYTHROCYTE [DISTWIDTH] IN BLOOD BY AUTOMATED COUNT: 48.5 FL (ref 35–45)
GFR SERPL CREATININE-BSD FRML MDRD: > 90 ML/MIN/1.73M2
GLUCOSE BLD-MCNC: 113 MG/DL (ref 70–108)
HCT VFR BLD CALC: 38 % (ref 37–47)
HDLC SERPL-MCNC: 45 MG/DL
HEMOGLOBIN: 12.7 GM/DL (ref 12–16)
LDL CHOLESTEROL CALCULATED: ABNORMAL MG/DL
MAGNESIUM: 1.8 MG/DL (ref 1.6–2.4)
MCH RBC QN AUTO: 32.6 PG (ref 26–33)
MCHC RBC AUTO-ENTMCNC: 33.4 GM/DL (ref 32.2–35.5)
MCV RBC AUTO: 97.7 FL (ref 81–99)
PLATELET # BLD: 223 THOU/MM3 (ref 130–400)
PMV BLD AUTO: 9.6 FL (ref 9.4–12.4)
POTASSIUM REFLEX MAGNESIUM: 3.4 MEQ/L (ref 3.5–5.2)
RBC # BLD: 3.89 MILL/MM3 (ref 4.2–5.4)
SODIUM BLD-SCNC: 135 MEQ/L (ref 135–145)
STATUS: NORMAL
TRIGL SERPL-MCNC: 418 MG/DL (ref 0–199)
WBC # BLD: 3.9 THOU/MM3 (ref 4.8–10.8)

## 2018-11-15 PROCEDURE — G8978 MOBILITY CURRENT STATUS: HCPCS

## 2018-11-15 PROCEDURE — 86787 VARICELLA-ZOSTER ANTIBODY: CPT

## 2018-11-15 PROCEDURE — G0378 HOSPITAL OBSERVATION PER HR: HCPCS

## 2018-11-15 PROCEDURE — 85027 COMPLETE CBC AUTOMATED: CPT

## 2018-11-15 PROCEDURE — G8979 MOBILITY GOAL STATUS: HCPCS

## 2018-11-15 PROCEDURE — 86696 HERPES SIMPLEX TYPE 2 TEST: CPT

## 2018-11-15 PROCEDURE — 80048 BASIC METABOLIC PNL TOTAL CA: CPT

## 2018-11-15 PROCEDURE — 6370000000 HC RX 637 (ALT 250 FOR IP): Performed by: INTERNAL MEDICINE

## 2018-11-15 PROCEDURE — 36415 COLL VENOUS BLD VENIPUNCTURE: CPT

## 2018-11-15 PROCEDURE — G8980 MOBILITY D/C STATUS: HCPCS

## 2018-11-15 PROCEDURE — 83735 ASSAY OF MAGNESIUM: CPT

## 2018-11-15 PROCEDURE — 86695 HERPES SIMPLEX TYPE 1 TEST: CPT

## 2018-11-15 PROCEDURE — 99217 PR OBSERVATION CARE DISCHARGE MANAGEMENT: CPT | Performed by: INTERNAL MEDICINE

## 2018-11-15 PROCEDURE — 94640 AIRWAY INHALATION TREATMENT: CPT

## 2018-11-15 PROCEDURE — 86694 HERPES SIMPLEX NES ANTBDY: CPT

## 2018-11-15 PROCEDURE — 80061 LIPID PANEL: CPT

## 2018-11-15 PROCEDURE — 96372 THER/PROPH/DIAG INJ SC/IM: CPT

## 2018-11-15 PROCEDURE — 6360000002 HC RX W HCPCS: Performed by: INTERNAL MEDICINE

## 2018-11-15 PROCEDURE — 97530 THERAPEUTIC ACTIVITIES: CPT

## 2018-11-15 PROCEDURE — 97161 PT EVAL LOW COMPLEX 20 MIN: CPT

## 2018-11-15 RX ADMIN — PAROXETINE HYDROCHLORIDE 30 MG: 30 TABLET, FILM COATED ORAL at 09:45

## 2018-11-15 RX ADMIN — PANTOPRAZOLE SODIUM 40 MG: 40 TABLET, DELAYED RELEASE ORAL at 06:25

## 2018-11-15 RX ADMIN — IPRATROPIUM BROMIDE AND ALBUTEROL SULFATE 3 ML: .5; 3 SOLUTION RESPIRATORY (INHALATION) at 07:57

## 2018-11-15 RX ADMIN — Medication 2 PUFF: at 07:56

## 2018-11-15 RX ADMIN — ASPIRIN 81 MG: 81 TABLET, COATED ORAL at 09:45

## 2018-11-15 RX ADMIN — RISPERIDONE 3 MG: 3 TABLET ORAL at 09:44

## 2018-11-15 RX ADMIN — CETIRIZINE HYDROCHLORIDE 5 MG: 5 TABLET ORAL at 09:44

## 2018-11-15 RX ADMIN — CYANOCOBALAMIN 1000 MCG: 1000 INJECTION, SOLUTION INTRAMUSCULAR at 09:46

## 2018-11-15 RX ADMIN — LURASIDONE HYDROCHLORIDE 40 MG: 40 TABLET, FILM COATED ORAL at 09:44

## 2018-11-15 RX ADMIN — PSEUDOEPHEDRINE HYDROCHLORIDE 120 MG: 120 TABLET, FILM COATED, EXTENDED RELEASE ORAL at 09:44

## 2018-11-15 RX ADMIN — BUPROPION HYDROCHLORIDE 100 MG: 100 TABLET, FILM COATED ORAL at 09:44

## 2018-11-15 RX ADMIN — BENZONATATE 100 MG: 100 CAPSULE ORAL at 09:44

## 2018-11-15 RX ADMIN — THERA TABS 1 TABLET: TAB at 09:44

## 2018-11-15 ASSESSMENT — PAIN SCALES - GENERAL
PAINLEVEL_OUTOF10: 10
PAINLEVEL_OUTOF10: 0
PAINLEVEL_OUTOF10: 0

## 2018-11-15 ASSESSMENT — PAIN DESCRIPTION - PAIN TYPE: TYPE: CHRONIC PAIN

## 2018-11-15 ASSESSMENT — PAIN DESCRIPTION - LOCATION: LOCATION: FOOT;KNEE

## 2018-11-15 ASSESSMENT — PAIN DESCRIPTION - ORIENTATION: ORIENTATION: RIGHT;LEFT

## 2018-11-15 NOTE — PLAN OF CARE
Problem: Pain:  Goal: Pain level will decrease  Pain level will decrease   Outcome: Ongoing  Pain goal being met with PRN medication    Problem: ACTIVITY INTOLERANCE/IMPAIRED MOBILITY  Goal: Mobility/activity is maintained at optimum level for patient  Outcome: Ongoing  Patient ambulating in hallways without difficulty    Problem: COMMUNICATION IMPAIRMENT  Goal: Ability to express needs and understand communication  Outcome: Ongoing  Patient able to express needs. Education being reinforced due to impaired short term memory    Comments: Care plan reviewed with patient. Patient verbalizes understanding of the plan of care and is contributing to goal setting.

## 2018-11-15 NOTE — DISCHARGE SUMMARY
11/15/18 1033  Last data filed at 11/14/18 1923   Gross per 24 hour   Intake              960 ml   Output                0 ml   Net              960 ml         General appearance:  No apparent distress, appears stated age and cooperative. HEENT:  Normal cephalic, atraumatic without obvious deformity. Pupils equal, round, and reactive to light. Extra ocular muscles intact. Conjunctivae/corneas clear. Neck: Supple, with full range of motion. No jugular venous distention. Trachea midline. Respiratory:  Normal respiratory effort. Clear to auscultation, bilaterally without Rales/Wheezes/Rhonchi. Cardiovascular:  Regular rate and rhythm with normal S1/S2 without murmurs, rubs or gallops. Abdomen: Soft, non-tender, non-distended with normal bowel sounds. Musculoskeletal:  No clubbing, cyanosis or edema bilaterally. Full range of motion without deformity. Skin: Skin color, texture, turgor normal.  No rashes or lesions. Neurologic:  Neurovascularly intact without any focal sensory/motor deficits. Cranial nerves: II-XII intact, grossly non-focal.  Psychiatric:  Alert and oriented, thought content appropriate, normal insight  Capillary Refill: Brisk,< 3 seconds   Peripheral Pulses: +2 palpable, equal bilaterally       Labs: For convenience and continuity at follow-up the following most recent labs are provided:      CBC:    Lab Results   Component Value Date    WBC 3.9 11/15/2018    HGB 12.7 11/15/2018    HCT 38.0 11/15/2018     11/15/2018       Renal:  Lab Results   Component Value Date     11/15/2018    K 3.4 11/15/2018    CL 96 11/15/2018    CO2 27 11/15/2018    BUN 12 11/15/2018    CREATININE 0.6 11/15/2018    CALCIUM 9.0 11/15/2018         Significant Diagnostic Studies    Radiology:   MRI BRAIN WO CONTRAST   Final Result       1. No evidence of an acute infarct. 2. Minimal severity chronic small vessel ischemic changes. 3. 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                **This report has been created using voice recognition software. It may contain minor errors which are inherent in voice recognition technology. **      Final report electronically signed by Dr. Omayra Huffman on 11/14/2018 3:51 PM      CTA HEAD W WO CONTRAST   Final Result       1. Normal CTA of the head. 2. No stenosis or atherosclerosis of the right carotid system. 3. Minimal atherosclerosis of the left carotid bulb. There is no stenosis. **This report has been created using voice recognition software. It may contain minor errors which are inherent in voice recognition technology. **      Final report electronically signed by Dr. Omayra Huffman on 11/14/2018 8:55 AM      CTA [address]   Final Result       1. Normal CTA of the head. 2. No stenosis or atherosclerosis of the right carotid system. 3. Minimal atherosclerosis of the left carotid bulb. There is no stenosis. **This report has been created using voice recognition software. It may contain minor errors which are inherent in voice recognition technology. **      Final report electronically signed by Dr. Omayra Huffman on 11/14/2018 8:55 AM      XR CHEST PORTABLE   Final Result      No acute pneumonia. Right basilar atelectasis. COPD. **This report has been created using voice recognition software. It may contain minor errors which are inherent in voice recognition technology. **      Final report electronically signed by Dr. Adriane White on 11/14/2018 6:34 AM      CT HEAD WO CONTRAST   Final Result      No acute ischemic infarct, hemorrhage, or mass effect. Possible small old infarct in the right cerebellar hemisphere. **This report has been created using voice recognition software. It may contain minor errors which are inherent in voice recognition technology. **      Final report electronically signed by Dr. Adriane White on 11/14/2018 6:14 AM             Consults:     IP CONSULT TO NEUROLOGY  IP CONSULT TO SOCIAL WORK    Disposition:    [x] Home       [] TCU       [] Rehab       [] Psych       [] SNF       [] [place]       [] Other-    Condition at Discharge: Stable    Code Status:  Full Code     Patient Instructions: Activity: activity as tolerated  Diet: DIET GENERAL; No Added Salt (3-4 GM)      Follow-up visits:   Isabela Rebolledo 1, [address]          [facility]  [address] [address]  [phone]             Discharge Medications:      Mario Machuca   [place] Medication Instructions [id number]    Printed on:11/15/18 1045   Medication Information                      aclidinium (TUDORZA PRESSAIR) 400 MCG/ACT AEPB inhaler  inhale 1 puff INTO THE LUNGS twice a day             albuterol sulfate HFA (VENTOLIN HFA) 108 (90 Base) MCG/ACT inhaler  Inhale 2 puffs into the lungs every 4 hours as needed for Wheezing             ASPIR-LOW 81 MG EC tablet  take 1 tablet by mouth once daily             ATRIPLA 600-200-300 MG per tablet  TAKE 1 TABLET BY MOUTH NIGHTLY             budesonide-formoterol (SYMBICORT) 160-4.5 MCG/ACT AERO  Inhale 2 puffs into the lungs 2 times daily Rinse mouth after its use. buPROPion (WELLBUTRIN) 100 MG tablet  Take 100 mg by mouth 2 times daily             cetirizine-psuedoephedrine (RA CETIRI-D) 5-120 MG per extended release tablet  take 1 tablet by mouth twice a day             guaiFENesin (MUCINEX) 600 MG extended release tablet  TAKE ONE (1) TABLET BY MOUTH TWICE DAILY             ibuprofen (IBU) 800 MG tablet  Take 1 tablet by mouth every 8 hours as needed for Pain Take with food.              ipratropium-albuterol (DUONEB) 0.5-2.5 (3) MG/3ML SOLN nebulizer solution  Inhale 3 mLs into the lungs 4 times daily             lurasidone (LATUDA) 40 MG TABS tablet  Take 40 mg by mouth daily             mirtazapine (REMERON) 30 MG tablet  Take 0.5-1 tablets by mouth nightly
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
## 2018-11-15 NOTE — PROGRESS NOTES
Patient resting in bed. Eyes closed. Respirations easy and unlabored. No signs of distress noted. Bed exit alarmed. Call light within reach. This student RN leaving the floor at this time. Report handed off to primary RN Carlee Rm.

## 2018-11-16 ENCOUNTER — TELEPHONE (OUTPATIENT)
Dept: FAMILY MEDICINE CLINIC | Age: 58
End: 2018-11-16

## 2018-11-16 LAB — VZV IGG SER QL IA: 1.9

## 2018-11-17 LAB
HSV 1 GLYCOPROTEIN G AB IGG: 0.07 IV
HSV TYPE 2 GLYCOPROTEIN G-SPECIFIC AB, IGG: 3.34 IV
VARICELLA ZOSTER AB IGM: 0.05 ISR

## 2018-11-19 ENCOUNTER — TELEPHONE (OUTPATIENT)
Dept: PULMONOLOGY | Age: 58
End: 2018-11-19

## 2018-11-19 ENCOUNTER — TELEPHONE (OUTPATIENT)
Dept: FAMILY MEDICINE CLINIC | Age: 58
End: 2018-11-19

## 2018-11-19 ENCOUNTER — OFFICE VISIT (OUTPATIENT)
Dept: FAMILY MEDICINE CLINIC | Age: 58
End: 2018-11-19
Payer: COMMERCIAL

## 2018-11-19 ENCOUNTER — CARE COORDINATION (OUTPATIENT)
Dept: CARE COORDINATION | Age: 58
End: 2018-11-19

## 2018-11-19 VITALS
TEMPERATURE: 97.6 F | RESPIRATION RATE: 16 BRPM | WEIGHT: 169.1 LBS | OXYGEN SATURATION: 96 % | HEART RATE: 79 BPM | BODY MASS INDEX: 28.17 KG/M2 | SYSTOLIC BLOOD PRESSURE: 112 MMHG | DIASTOLIC BLOOD PRESSURE: 60 MMHG | HEIGHT: 65 IN

## 2018-11-19 DIAGNOSIS — G47.33 OSA (OBSTRUCTIVE SLEEP APNEA): Primary | ICD-10-CM

## 2018-11-19 DIAGNOSIS — R20.0 FACIAL NUMBNESS: Primary | ICD-10-CM

## 2018-11-19 DIAGNOSIS — F41.9 ANXIETY AND DEPRESSION: ICD-10-CM

## 2018-11-19 DIAGNOSIS — Z23 NEED FOR INFLUENZA VACCINATION: ICD-10-CM

## 2018-11-19 DIAGNOSIS — F32.A ANXIETY AND DEPRESSION: ICD-10-CM

## 2018-11-19 DIAGNOSIS — Z86.73 OLD CEREBELLAR INFARCT WITHOUT LATE EFFECT: ICD-10-CM

## 2018-11-19 DIAGNOSIS — B20 HIV (HUMAN IMMUNODEFICIENCY VIRUS INFECTION) (HCC): ICD-10-CM

## 2018-11-19 DIAGNOSIS — F17.200 TOBACCO DEPENDENCE: ICD-10-CM

## 2018-11-19 DIAGNOSIS — J44.9 CHRONIC OBSTRUCTIVE PULMONARY DISEASE, UNSPECIFIED COPD TYPE (HCC): ICD-10-CM

## 2018-11-19 DIAGNOSIS — E78.00 PURE HYPERCHOLESTEROLEMIA: ICD-10-CM

## 2018-11-19 DIAGNOSIS — R55 SYNCOPE, UNSPECIFIED SYNCOPE TYPE: ICD-10-CM

## 2018-11-19 DIAGNOSIS — I25.10 CORONARY ARTERY DISEASE INVOLVING NATIVE CORONARY ARTERY OF NATIVE HEART WITHOUT ANGINA PECTORIS: ICD-10-CM

## 2018-11-19 LAB
HERPES TYPE 1/2 IGM COMBINED: 0.43 IV
HERPES TYPE I/II IGG COMBINED: 18.3 IV

## 2018-11-19 PROCEDURE — 99495 TRANSJ CARE MGMT MOD F2F 14D: CPT | Performed by: FAMILY MEDICINE

## 2018-11-19 PROCEDURE — 90688 IIV4 VACCINE SPLT 0.5 ML IM: CPT | Performed by: FAMILY MEDICINE

## 2018-11-19 PROCEDURE — 90471 IMMUNIZATION ADMIN: CPT | Performed by: FAMILY MEDICINE

## 2018-11-19 NOTE — CARE COORDINATION
through [address] with customer rep. Fax number to send order is: [phone]. They can deliver and or ship product to pt. Script for Ensure faxed to [phone]. Care Coordination Interventions    Program Enrollment:  Complex Care  Referral from Primary Care Provider:  No  Suggested Interventions and [address]:  Completed (Comment: Continued Care. nurse 1x wk)  Meals on Wheels:  Not Started (Comment: contact number for [employer] and [employer] provided to pt)  Medi Set or Pill Pack:  Declined (Comment: pt does not use medi set. states that she needs to get one.  tried [facility], but did not like)  Other Services:  (Comment: has DEYANIRA Light with [address] [place] [address])  Transportation Support:  Completed  Zone Management Tools:  Completed (Comment: reviewed and mailed COPD zone mgmt tool sheet. 7/11 pt reports that she did not receive zone mgmt. in mail. new COPD zone mgmt tool sheet mailed to pt. )  Other Services or Interventions:  CHW through [address] Ad Abraham [facility]         Goals Addressed     None          Prior to Admission medications    Medication Sig Start Date End Date Taking? Authorizing Provider   Nutritional Supplements (ENSURE PLUS HN) LIQD Ensure plus TID 11/19/18   Patsy Greenwood DO   vitamin B-12 (CYANOCOBALAMIN) 500 MCG tablet Take 1 tablet by mouth daily 11/14/18 11/14/19  Danay Viera MD   budesonide-formoterol (SYMBICORT) 160-4.5 MCG/ACT AERO Inhale 2 puffs into the lungs 2 times daily Rinse mouth after its use.  10/30/18 10/30/19  JAMEY Barboza CNP   ipratropium-albuterol (DUONEB) 0.5-2.5 (3) MG/3ML SOLN nebulizer solution Inhale 3 mLs into the lungs 4 times daily 10/30/18   JAMEY Barboza CNP   ASPIR-LOW 81 MG EC tablet take 1 tablet by mouth once daily 10/22/18   Sanjay Ghotra MD   omeprazole (PRILOSEC) 40 MG delayed release capsule Take 1 capsule by mouth 2 Torsten Villarreal   1/3/2019 2:15 PM Kathy Bravo MD [facility] [place]   1/22/2019 2:15 PM RON Jang             Ensure prescribed at visit today. Pt wanted it to go to [address] [place] [address]. They do not carry supplement products. Will need to find another DME company. Contacted Reny (CHW with [employer]). Informed that [address] [place] [address] does supply Ensure. If pt is to take more than daily PA will have to be obtained. Spoke with Margeret Olszewski. She is ok with going through [address] with customer rep. Fax number to send order is: [phone]. They can deliver and or ship product to pt.     Script for Ensure faxed to [phone]

## 2018-11-19 NOTE — PROGRESS NOTES
Visit Information    Have you changed or started any medications since your last visit including any over-the-counter medicines, vitamins, or herbal medicines? yes - see med list   Are you having any side effects from any of your medications? -  no  Have you stopped taking any of your medications? Is so, why? -  no    Have you seen any other physician or provider since your last visit? Yes - Records Obtained  Have you had any other diagnostic tests since your last visit? Yes - Records Obtained  Have you been seen in the emergency room and/or had an admission to a hospital since we last saw you? Yes - Records Obtained  Have you had your routine dental cleaning in the past 6 months? no    Have you activated your [employer] account? If not, what are your barriers? No: declined      Patient Care Team:  Ren Pedraza DO as PCP - General (Family Medicine)  Mathew Whitaker MD as Consulting Physician (Pulmonology)  [facility], RN as Care Coordinator    Medical History Review  Past Medical, Family, and Social History reviewed and does contribute to the patient presenting condition    Health Maintenance   Topic Date Due    DTaP/Tdap/Td vaccine (1 - Tdap) 07/18/1979    Pneumococcal highest risk (1 of 3 - PCV13) 07/18/1979    Shingles Vaccine (1 of 2 - 2 Dose Series) 07/18/2010    Breast cancer screen  02/23/2018    Flu vaccine (1) 09/01/2018    Cervical cancer screen  02/23/2019    Diabetes screen  05/14/2021    Lipid screen  11/15/2023    Colon cancer screen colonoscopy  02/23/2026    Hepatitis C screen  Completed       Immunizations     Name Date Dose Route    Influenza, Quadv, 3 Years and older, IM (Fluzone 3 yrs and older or Afluria 5 yrs and older) 11/19/2018 0.5 mL Intramuscular    Site: Deltoid- Left    Lot: [id number]    NDC: 52503-819-25        After obtaining consent, and per orders of Dr. Yue Zavala, injection of Flu Vaccine 0.5 ML given IM in Left deltoid by Vinnie Garay ([facility]).

## 2018-11-19 NOTE — PATIENT INSTRUCTIONS
You may receive a survey about your visit with us today. The feedback from our patients helps us identify what is working well and where the service to all patients can be enhanced. Thank you! Tobacco Cessation Programs     Telephonic behavior modification  Kimo Morning ([phone])   Counseling service for those who are ready to quit using tobacco.     Available for uninsured [address] [place] [address] Dr residents, [place] recipients, pregnant women, or patients whose health plans or employers are members of the [address] [place] [address] behavior modification   http://[place]. Quitlogix. org   Online support program to help patients through each step of the quitting process. Available 24 hours a day 7 days a week. Provides up to date researched based tool, step-by-step guides, and motivational messages. Online behavior modification   www.lungusa.org/stop-smoking/how-to-quit   HelpLine: 1-800-LUNG-USA (289-4741)   Email questions to:  Isac@Flickr. InMobi    Website offers resources to help tobacco users figure out their reasons for quitting and then take the big step of quitting for good. Hypnosis   Location: [facility], [facility], [place]   Contact: Ernesto Benoit, PhD at [phone]   Hypnosis for tobacco cessation   Cost $225 for the initial session and $175 for each session afterwards. Most patients require 6-8 sessions. There is the option to submit through the patients insurance. Hypnosis and behavior modification   Location: [place] [address],  [address] [facility] [address].[place], [place]   Contact: Saud Flowers, PhD at [phone]  [address] [place] [address] Counseling and hypnosis for nicotine addition   Cost: For uninsured patients:  Please call above phone number  Cost for insured patients depends on patients insurance plan.     Behavior modification   Location: [facility], [address] [facility] [address] [place] [address], [address]: [place] [address] include four one-on-one appointments between the patient and a

## 2018-11-19 NOTE — TELEPHONE ENCOUNTER
[facility] Transitions Initial Follow Up Call    Outreach made within 2 business days of discharge: Yes    Patient: Latasha Robb Patient : 1960   MRN: [id number]  Reason for Admission: There are no discharge diagnoses documented for the most recent discharge. Discharge Date: 11/15/18       Spoke with: Pt    Discharge department/facility: [facility]    TCM Interactive Patient Contact:  Was patient able to fill all prescriptions: Yes  Was patient instructed to bring all medications to the follow-up visit: Yes  Is patient taking all medications as directed in the discharge summary?  Yes  Does patient understand their discharge instructions: Yes  Does patient have questions or concerns that need addressed prior to 7-14 day follow up office visit: no    Scheduled appointment with PCP within 7-14 days    Follow Up  Future Appointments  Date Time Provider Bertin Hoffman   2018 3:00 PM Marvin Titus parrish   2018 3:30 PM MD [place]INA Vincent [place] Urology [facility]   2018 1:00 PM STR CT IMAGING RM1  [facility] OUT EXP STR Radiolog   1/3/2019 1:00 PM Luis Angel Parsons APRN - CNP Pulm Med [facility] [place]   1/3/2019 2:15 PM Channing Pineda MD [address] Heart [facility] [place]   2019 2:15 PM Dima Castillo, [address] ([address] [place] [address]

## 2018-11-21 ENCOUNTER — TELEPHONE (OUTPATIENT)
Dept: FAMILY MEDICINE CLINIC | Age: 58
End: 2018-11-21

## 2018-11-26 ENCOUNTER — TELEPHONE (OUTPATIENT)
Dept: SLEEP CENTER | Age: 58
End: 2018-11-26

## 2018-12-11 ENCOUNTER — TELEPHONE (OUTPATIENT)
Dept: FAMILY MEDICINE CLINIC | Age: 58
End: 2018-12-11

## 2018-12-11 RX ORDER — BENZONATATE 200 MG/1
CAPSULE ORAL
Qty: 30 CAPSULE | Refills: 0 | OUTPATIENT
Start: 2018-12-11

## 2018-12-12 RX ORDER — IBUPROFEN 800 MG/1
800 TABLET ORAL EVERY 8 HOURS PRN
Qty: 90 TABLET | Refills: 0 | Status: SHIPPED | OUTPATIENT
Start: 2018-12-12 | End: 2019-02-26

## 2018-12-17 ENCOUNTER — HOSPITAL ENCOUNTER (OUTPATIENT)
Dept: CT IMAGING | Age: 58
Discharge: HOME | End: 2018-12-17
Payer: COMMERCIAL

## 2018-12-17 DIAGNOSIS — Z87.891 PERSONAL HISTORY OF TOBACCO USE: ICD-10-CM

## 2018-12-17 DIAGNOSIS — F17.218 CIGARETTE NICOTINE DEPENDENCE WITH OTHER NICOTINE-INDUCED DISORDER: ICD-10-CM

## 2018-12-17 PROCEDURE — G0297 LDCT FOR LUNG CA SCREEN: HCPCS

## 2018-12-19 ENCOUNTER — TELEPHONE (OUTPATIENT)
Dept: PULMONOLOGY | Age: 58
End: 2018-12-19

## 2018-12-24 ENCOUNTER — CARE COORDINATION (OUTPATIENT)
Dept: CARE COORDINATION | Age: 58
End: 2018-12-24

## 2018-12-24 ASSESSMENT — ENCOUNTER SYMPTOMS: DYSPNEA ASSOCIATED WITH: EXERTION

## 2019-01-03 ENCOUNTER — OFFICE VISIT (OUTPATIENT)
Dept: PULMONOLOGY | Age: 59
End: 2019-01-03
Payer: COMMERCIAL

## 2019-01-03 ENCOUNTER — OFFICE VISIT (OUTPATIENT)
Dept: CARDIOLOGY CLINIC | Age: 59
End: 2019-01-03
Payer: COMMERCIAL

## 2019-01-03 VITALS
HEIGHT: 65 IN | WEIGHT: 169 LBS | DIASTOLIC BLOOD PRESSURE: 51 MMHG | SYSTOLIC BLOOD PRESSURE: 75 MMHG | BODY MASS INDEX: 28.16 KG/M2 | HEART RATE: 75 BPM

## 2019-01-03 VITALS
HEART RATE: 76 BPM | WEIGHT: 171 LBS | BODY MASS INDEX: 28.49 KG/M2 | OXYGEN SATURATION: 96 % | DIASTOLIC BLOOD PRESSURE: 64 MMHG | HEIGHT: 65 IN | TEMPERATURE: 99.1 F | SYSTOLIC BLOOD PRESSURE: 108 MMHG

## 2019-01-03 DIAGNOSIS — Z21 HIV POSITIVE (HCC): ICD-10-CM

## 2019-01-03 DIAGNOSIS — I25.10 ASCVD (ARTERIOSCLEROTIC CARDIOVASCULAR DISEASE): Primary | ICD-10-CM

## 2019-01-03 DIAGNOSIS — F31.9 BIPOLAR DISEASE, CHRONIC (HCC): ICD-10-CM

## 2019-01-03 DIAGNOSIS — Z72.0 TOBACCO ABUSE: ICD-10-CM

## 2019-01-03 DIAGNOSIS — D84.9 IMMUNOCOMPROMISED STATE (HCC): Chronic | ICD-10-CM

## 2019-01-03 DIAGNOSIS — R91.1 INCIDENTAL PULMONARY NODULE, GREATER THAN OR EQUAL TO 8MM: Primary | ICD-10-CM

## 2019-01-03 DIAGNOSIS — J44.9 COPD, MODERATE (HCC): ICD-10-CM

## 2019-01-03 PROCEDURE — G8598 ASA/ANTIPLAT THER USED: HCPCS | Performed by: NURSE PRACTITIONER

## 2019-01-03 PROCEDURE — G8427 DOCREV CUR MEDS BY ELIG CLIN: HCPCS | Performed by: INTERNAL MEDICINE

## 2019-01-03 PROCEDURE — 3017F COLORECTAL CA SCREEN DOC REV: CPT | Performed by: NURSE PRACTITIONER

## 2019-01-03 PROCEDURE — G8482 FLU IMMUNIZE ORDER/ADMIN: HCPCS | Performed by: NURSE PRACTITIONER

## 2019-01-03 PROCEDURE — G8926 SPIRO NO PERF OR DOC: HCPCS | Performed by: NURSE PRACTITIONER

## 2019-01-03 PROCEDURE — 99214 OFFICE O/P EST MOD 30 MIN: CPT | Performed by: NURSE PRACTITIONER

## 2019-01-03 PROCEDURE — 3017F COLORECTAL CA SCREEN DOC REV: CPT | Performed by: INTERNAL MEDICINE

## 2019-01-03 PROCEDURE — G8417 CALC BMI ABV UP PARAM F/U: HCPCS | Performed by: NURSE PRACTITIONER

## 2019-01-03 PROCEDURE — G8598 ASA/ANTIPLAT THER USED: HCPCS | Performed by: INTERNAL MEDICINE

## 2019-01-03 PROCEDURE — 99213 OFFICE O/P EST LOW 20 MIN: CPT | Performed by: INTERNAL MEDICINE

## 2019-01-03 PROCEDURE — 3023F SPIROM DOC REV: CPT | Performed by: NURSE PRACTITIONER

## 2019-01-03 PROCEDURE — G8482 FLU IMMUNIZE ORDER/ADMIN: HCPCS | Performed by: INTERNAL MEDICINE

## 2019-01-03 PROCEDURE — 4004F PT TOBACCO SCREEN RCVD TLK: CPT | Performed by: NURSE PRACTITIONER

## 2019-01-03 PROCEDURE — G8427 DOCREV CUR MEDS BY ELIG CLIN: HCPCS | Performed by: NURSE PRACTITIONER

## 2019-01-03 PROCEDURE — 4004F PT TOBACCO SCREEN RCVD TLK: CPT | Performed by: INTERNAL MEDICINE

## 2019-01-03 PROCEDURE — G8417 CALC BMI ABV UP PARAM F/U: HCPCS | Performed by: INTERNAL MEDICINE

## 2019-01-03 RX ORDER — FLUTICASONE FUROATE AND VILANTEROL 200; 25 UG/1; UG/1
1 POWDER RESPIRATORY (INHALATION) DAILY
Qty: 1 EACH | Refills: 11 | Status: SHIPPED | OUTPATIENT
Start: 2019-01-03 | End: 2020-01-03

## 2019-01-03 RX ORDER — PREDNISONE 10 MG/1
10 TABLET ORAL DAILY
Qty: 18 TABLET | Refills: 0 | Status: SHIPPED | OUTPATIENT
Start: 2019-01-03 | End: 2019-01-03 | Stop reason: SDUPTHER

## 2019-01-03 RX ORDER — FLUTICASONE PROPIONATE 50 MCG
2 SPRAY, SUSPENSION (ML) NASAL DAILY
Qty: 3 BOTTLE | Refills: 1 | Status: SHIPPED | OUTPATIENT
Start: 2019-01-03 | End: 2020-06-24 | Stop reason: SDUPTHER

## 2019-01-03 RX ORDER — PREDNISONE 10 MG/1
10 TABLET ORAL DAILY
Qty: 18 TABLET | Refills: 0 | Status: SHIPPED | OUTPATIENT
Start: 2019-01-03 | End: 2019-02-02

## 2019-01-03 ASSESSMENT — ENCOUNTER SYMPTOMS
EYE PAIN: 0
STRIDOR: 0
ORTHOPNEA: 0
SINUS PRESSURE: 1
CHEST TIGHTNESS: 1
BOWEL INCONTINENCE: 0
EYES NEGATIVE: 1
COUGH: 1
CONSTIPATION: 0
DIARRHEA: 0
SPUTUM PRODUCTION: 0
COUGH: 0
BACK PAIN: 0
DOUBLE VISION: 0
CHANGE IN BOWEL HABIT: 0
SHORTNESS OF BREATH: 1
SHORTNESS OF BREATH: 0
BACK PAIN: 0
HEMOPTYSIS: 0
WHEEZING: 0
HOARSE VOICE: 0
DIARRHEA: 0
NAUSEA: 0
BLOATING: 0
EYE DISCHARGE: 0
ALLERGIC/IMMUNOLOGIC NEGATIVE: 1
ABDOMINAL PAIN: 0
BLURRED VISION: 0

## 2019-01-28 ENCOUNTER — OFFICE VISIT (OUTPATIENT)
Dept: UROLOGY | Age: 59
End: 2019-01-28
Payer: COMMERCIAL

## 2019-01-28 ENCOUNTER — TELEPHONE (OUTPATIENT)
Dept: PULMONOLOGY | Age: 59
End: 2019-01-28

## 2019-01-28 VITALS
DIASTOLIC BLOOD PRESSURE: 68 MMHG | WEIGHT: 172 LBS | HEIGHT: 65 IN | BODY MASS INDEX: 28.66 KG/M2 | SYSTOLIC BLOOD PRESSURE: 122 MMHG

## 2019-01-28 DIAGNOSIS — N81.89 GENITAL PROLAPSE, OLD LACERATION OF MUSCLES OF PELVIC FLOOR: ICD-10-CM

## 2019-01-28 DIAGNOSIS — N81.9 MIXED URINARY INCONTINENCE DUE TO FEMALE GENITAL PROLAPSE: Primary | ICD-10-CM

## 2019-01-28 DIAGNOSIS — N39.46 MIXED URINARY INCONTINENCE DUE TO FEMALE GENITAL PROLAPSE: Primary | ICD-10-CM

## 2019-01-28 PROCEDURE — G8427 DOCREV CUR MEDS BY ELIG CLIN: HCPCS | Performed by: UROLOGY

## 2019-01-28 PROCEDURE — 99214 OFFICE O/P EST MOD 30 MIN: CPT | Performed by: UROLOGY

## 2019-01-28 PROCEDURE — G8598 ASA/ANTIPLAT THER USED: HCPCS | Performed by: UROLOGY

## 2019-01-28 PROCEDURE — 4004F PT TOBACCO SCREEN RCVD TLK: CPT | Performed by: UROLOGY

## 2019-01-28 PROCEDURE — G8417 CALC BMI ABV UP PARAM F/U: HCPCS | Performed by: UROLOGY

## 2019-01-28 PROCEDURE — 3017F COLORECTAL CA SCREEN DOC REV: CPT | Performed by: UROLOGY

## 2019-01-28 PROCEDURE — G8482 FLU IMMUNIZE ORDER/ADMIN: HCPCS | Performed by: UROLOGY

## 2019-01-28 RX ORDER — OXYBUTYNIN CHLORIDE 5 MG/1
5 TABLET ORAL 3 TIMES DAILY
Qty: 270 TABLET | Refills: 1 | Status: SHIPPED | OUTPATIENT
Start: 2019-01-28 | End: 2019-03-28 | Stop reason: SDUPTHER

## 2019-01-28 ASSESSMENT — ENCOUNTER SYMPTOMS
NAUSEA: 0
EYE ITCHING: 0
EYE PAIN: 0
CHOKING: 0
CHEST TIGHTNESS: 0
COLOR CHANGE: 0
DIARRHEA: 0

## 2019-01-30 ENCOUNTER — CARE COORDINATION (OUTPATIENT)
Dept: CARE COORDINATION | Age: 59
End: 2019-01-30

## 2019-01-30 ASSESSMENT — ENCOUNTER SYMPTOMS: DYSPNEA ASSOCIATED WITH: EXERTION

## 2019-02-02 ENCOUNTER — HOSPITAL ENCOUNTER (OUTPATIENT)
Age: 59
Setting detail: OBSERVATION
Discharge: HOME | End: 2019-02-03
Attending: FAMILY MEDICINE | Admitting: INTERNAL MEDICINE
Payer: COMMERCIAL

## 2019-02-02 ENCOUNTER — APPOINTMENT (OUTPATIENT)
Dept: GENERAL RADIOLOGY | Age: 59
End: 2019-02-02
Payer: COMMERCIAL

## 2019-02-02 DIAGNOSIS — J44.1 ACUTE EXACERBATION OF CHRONIC OBSTRUCTIVE PULMONARY DISEASE (COPD) (HCC): Primary | ICD-10-CM

## 2019-02-02 LAB
ALLEN TEST: POSITIVE
ANION GAP SERPL CALCULATED.3IONS-SCNC: 16 MEQ/L (ref 8–16)
BASE EXCESS (CALCULATED): -2.6 MMOL/L (ref -2.5–2.5)
BASOPHILS # BLD: 0.8 %
BASOPHILS ABSOLUTE: 0 THOU/MM3 (ref 0–0.1)
BUN BLDV-MCNC: 10 MG/DL (ref 7–22)
CALCIUM SERPL-MCNC: 8.6 MG/DL (ref 8.5–10.5)
CHLORIDE BLD-SCNC: 100 MEQ/L (ref 98–111)
CO2: 20 MEQ/L (ref 23–33)
COLLECTED BY:: ABNORMAL
CREAT SERPL-MCNC: 1 MG/DL (ref 0.4–1.2)
DEVICE: ABNORMAL
EKG ATRIAL RATE: 85 BPM
EKG P AXIS: 78 DEGREES
EKG P-R INTERVAL: 142 MS
EKG Q-T INTERVAL: 410 MS
EKG QRS DURATION: 100 MS
EKG QTC CALCULATION (BAZETT): 487 MS
EKG R AXIS: 68 DEGREES
EKG T AXIS: 66 DEGREES
EKG VENTRICULAR RATE: 85 BPM
EOSINOPHIL # BLD: 0.8 %
EOSINOPHILS ABSOLUTE: 0 THOU/MM3 (ref 0–0.4)
ERYTHROCYTE [DISTWIDTH] IN BLOOD BY AUTOMATED COUNT: 12.9 % (ref 11.5–14.5)
ERYTHROCYTE [DISTWIDTH] IN BLOOD BY AUTOMATED COUNT: 46.1 FL (ref 35–45)
FLU A ANTIGEN: NEGATIVE
FLU B ANTIGEN: NEGATIVE
GFR SERPL CREATININE-BSD FRML MDRD: 57 ML/MIN/1.73M2
GLUCOSE BLD-MCNC: 107 MG/DL (ref 70–108)
HCO3: 23 MMOL/L (ref 23–28)
HCT VFR BLD CALC: 39.8 % (ref 37–47)
HEMOGLOBIN: 13.2 GM/DL (ref 12–16)
IMMATURE GRANS (ABS): 0.02 THOU/MM3 (ref 0–0.07)
IMMATURE GRANULOCYTES: 0.3 %
LACTIC ACID, SEPSIS: 1.8 MMOL/L (ref 0.5–1.9)
LYMPHOCYTES # BLD: 28.9 %
LYMPHOCYTES ABSOLUTE: 1.8 THOU/MM3 (ref 1–4.8)
MCH RBC QN AUTO: 32.4 PG (ref 26–33)
MCHC RBC AUTO-ENTMCNC: 33.2 GM/DL (ref 32.2–35.5)
MCV RBC AUTO: 97.8 FL (ref 81–99)
MONOCYTES # BLD: 9.4 %
MONOCYTES ABSOLUTE: 0.6 THOU/MM3 (ref 0.4–1.3)
NUCLEATED RED BLOOD CELLS: 0 /100 WBC
O2 SATURATION: 98 %
OSMOLALITY CALCULATION: 271.5 MOSMOL/KG (ref 275–300)
PCO2: 44 MMHG (ref 35–45)
PH BLOOD GAS: 7.33 (ref 7.35–7.45)
PLATELET # BLD: 234 THOU/MM3 (ref 130–400)
PMV BLD AUTO: 9.7 FL (ref 9.4–12.4)
PO2: 113 MMHG (ref 71–104)
POTASSIUM SERPL-SCNC: 3.6 MEQ/L (ref 3.5–5.2)
PROCALCITONIN: 0.04 NG/ML (ref 0.01–0.09)
RBC # BLD: 4.07 MILL/MM3 (ref 4.2–5.4)
SEG NEUTROPHILS: 59.8 %
SEGMENTED NEUTROPHILS ABSOLUTE COUNT: 3.7 THOU/MM3 (ref 1.8–7.7)
SODIUM BLD-SCNC: 136 MEQ/L (ref 135–145)
SOURCE, BLOOD GAS: ABNORMAL
TROPONIN T: < 0.01 NG/ML
WBC # BLD: 6.2 THOU/MM3 (ref 4.8–10.8)

## 2019-02-02 PROCEDURE — 6370000000 HC RX 637 (ALT 250 FOR IP): Performed by: FAMILY MEDICINE

## 2019-02-02 PROCEDURE — 82803 BLOOD GASES ANY COMBINATION: CPT

## 2019-02-02 PROCEDURE — 2580000003 HC RX 258: Performed by: INTERNAL MEDICINE

## 2019-02-02 PROCEDURE — 87040 BLOOD CULTURE FOR BACTERIA: CPT

## 2019-02-02 PROCEDURE — 2709999900 HC NON-CHARGEABLE SUPPLY

## 2019-02-02 PROCEDURE — 94760 N-INVAS EAR/PLS OXIMETRY 1: CPT

## 2019-02-02 PROCEDURE — 96372 THER/PROPH/DIAG INJ SC/IM: CPT

## 2019-02-02 PROCEDURE — 6370000000 HC RX 637 (ALT 250 FOR IP): Performed by: INTERNAL MEDICINE

## 2019-02-02 PROCEDURE — 84145 PROCALCITONIN (PCT): CPT

## 2019-02-02 PROCEDURE — 96374 THER/PROPH/DIAG INJ IV PUSH: CPT

## 2019-02-02 PROCEDURE — 6360000002 HC RX W HCPCS: Performed by: FAMILY MEDICINE

## 2019-02-02 PROCEDURE — 6370000000 HC RX 637 (ALT 250 FOR IP): Performed by: HOSPITALIST

## 2019-02-02 PROCEDURE — 80048 BASIC METABOLIC PNL TOTAL CA: CPT

## 2019-02-02 PROCEDURE — 2700000000 HC OXYGEN THERAPY PER DAY

## 2019-02-02 PROCEDURE — 6360000002 HC RX W HCPCS: Performed by: INTERNAL MEDICINE

## 2019-02-02 PROCEDURE — G0378 HOSPITAL OBSERVATION PER HR: HCPCS

## 2019-02-02 PROCEDURE — 83605 ASSAY OF LACTIC ACID: CPT

## 2019-02-02 PROCEDURE — 36600 WITHDRAWAL OF ARTERIAL BLOOD: CPT

## 2019-02-02 PROCEDURE — 87804 INFLUENZA ASSAY W/OPTIC: CPT

## 2019-02-02 PROCEDURE — 94644 CONT INHLJ TX 1ST HOUR: CPT

## 2019-02-02 PROCEDURE — 84484 ASSAY OF TROPONIN QUANT: CPT

## 2019-02-02 PROCEDURE — 99285 EMERGENCY DEPT VISIT HI MDM: CPT

## 2019-02-02 PROCEDURE — 36415 COLL VENOUS BLD VENIPUNCTURE: CPT

## 2019-02-02 PROCEDURE — 94640 AIRWAY INHALATION TREATMENT: CPT

## 2019-02-02 PROCEDURE — 71045 X-RAY EXAM CHEST 1 VIEW: CPT

## 2019-02-02 PROCEDURE — 93005 ELECTROCARDIOGRAM TRACING: CPT | Performed by: FAMILY MEDICINE

## 2019-02-02 PROCEDURE — 93010 ELECTROCARDIOGRAM REPORT: CPT | Performed by: INTERNAL MEDICINE

## 2019-02-02 PROCEDURE — 99223 1ST HOSP IP/OBS HIGH 75: CPT | Performed by: INTERNAL MEDICINE

## 2019-02-02 PROCEDURE — 2580000003 HC RX 258: Performed by: FAMILY MEDICINE

## 2019-02-02 PROCEDURE — 85025 COMPLETE CBC W/AUTO DIFF WBC: CPT

## 2019-02-02 RX ORDER — DOXYCYCLINE HYCLATE 100 MG
100 TABLET ORAL EVERY 12 HOURS
Status: DISCONTINUED | OUTPATIENT
Start: 2019-02-02 | End: 2019-02-03 | Stop reason: HOSPADM

## 2019-02-02 RX ORDER — NICOTINE 21 MG/24HR
1 PATCH, TRANSDERMAL 24 HOURS TRANSDERMAL EVERY 24 HOURS
Status: DISCONTINUED | OUTPATIENT
Start: 2019-02-02 | End: 2019-02-03 | Stop reason: HOSPADM

## 2019-02-02 RX ORDER — BUPROPION HYDROCHLORIDE 100 MG/1
100 TABLET ORAL 2 TIMES DAILY
Status: DISCONTINUED | OUTPATIENT
Start: 2019-02-02 | End: 2019-02-03 | Stop reason: HOSPADM

## 2019-02-02 RX ORDER — EFAVIRENZ, EMTRICITABINE AND TENOFOVIR DISOPROXIL FUMARATE 600; 200; 300 MG/1; MG/1; MG/1
1 TABLET, FILM COATED ORAL NIGHTLY
Status: DISCONTINUED | OUTPATIENT
Start: 2019-02-02 | End: 2019-02-03 | Stop reason: HOSPADM

## 2019-02-02 RX ORDER — OXYBUTYNIN CHLORIDE 5 MG/1
5 TABLET ORAL 3 TIMES DAILY
Status: DISCONTINUED | OUTPATIENT
Start: 2019-02-02 | End: 2019-02-03 | Stop reason: HOSPADM

## 2019-02-02 RX ORDER — FLUTICASONE FUROATE AND VILANTEROL 200; 25 UG/1; UG/1
1 POWDER RESPIRATORY (INHALATION) DAILY
Status: DISCONTINUED | OUTPATIENT
Start: 2019-02-02 | End: 2019-02-02 | Stop reason: RX

## 2019-02-02 RX ORDER — PAROXETINE 30 MG/1
30 TABLET, FILM COATED ORAL DAILY
Status: DISCONTINUED | OUTPATIENT
Start: 2019-02-02 | End: 2019-02-03 | Stop reason: HOSPADM

## 2019-02-02 RX ORDER — SODIUM CHLORIDE 9 MG/ML
INJECTION, SOLUTION INTRAVENOUS CONTINUOUS
Status: DISCONTINUED | OUTPATIENT
Start: 2019-02-02 | End: 2019-02-02 | Stop reason: SDUPTHER

## 2019-02-02 RX ORDER — GUAIFENESIN 600 MG/1
600 TABLET, EXTENDED RELEASE ORAL 2 TIMES DAILY
Status: DISCONTINUED | OUTPATIENT
Start: 2019-02-02 | End: 2019-02-03 | Stop reason: HOSPADM

## 2019-02-02 RX ORDER — ALBUTEROL SULFATE 2.5 MG/3ML
2.5 SOLUTION RESPIRATORY (INHALATION) 4 TIMES DAILY
Status: DISCONTINUED | OUTPATIENT
Start: 2019-02-02 | End: 2019-02-03 | Stop reason: HOSPADM

## 2019-02-02 RX ORDER — IPRATROPIUM BROMIDE AND ALBUTEROL SULFATE 2.5; .5 MG/3ML; MG/3ML
1 SOLUTION RESPIRATORY (INHALATION) ONCE
Status: COMPLETED | OUTPATIENT
Start: 2019-02-02 | End: 2019-02-02

## 2019-02-02 RX ORDER — 0.9 % SODIUM CHLORIDE 0.9 %
1000 INTRAVENOUS SOLUTION INTRAVENOUS ONCE
Status: COMPLETED | OUTPATIENT
Start: 2019-02-02 | End: 2019-02-02

## 2019-02-02 RX ORDER — PANTOPRAZOLE SODIUM 40 MG/1
40 TABLET, DELAYED RELEASE ORAL
Status: DISCONTINUED | OUTPATIENT
Start: 2019-02-03 | End: 2019-02-03 | Stop reason: HOSPADM

## 2019-02-02 RX ORDER — IPRATROPIUM BROMIDE AND ALBUTEROL SULFATE 2.5; .5 MG/3ML; MG/3ML
1 SOLUTION RESPIRATORY (INHALATION) 4 TIMES DAILY
Status: DISCONTINUED | OUTPATIENT
Start: 2019-02-02 | End: 2019-02-02 | Stop reason: ALTCHOICE

## 2019-02-02 RX ORDER — RISPERIDONE 1 MG/1
3 TABLET, FILM COATED ORAL 2 TIMES DAILY
Status: DISCONTINUED | OUTPATIENT
Start: 2019-02-03 | End: 2019-02-03 | Stop reason: HOSPADM

## 2019-02-02 RX ORDER — HYDROCODONE BITARTRATE AND ACETAMINOPHEN 5; 325 MG/1; MG/1
1 TABLET ORAL EVERY 6 HOURS PRN
Status: DISCONTINUED | OUTPATIENT
Start: 2019-02-02 | End: 2019-02-03 | Stop reason: HOSPADM

## 2019-02-02 RX ORDER — TRAZODONE HYDROCHLORIDE 50 MG/1
50 TABLET ORAL NIGHTLY
Status: DISCONTINUED | OUTPATIENT
Start: 2019-02-02 | End: 2019-02-03 | Stop reason: HOSPADM

## 2019-02-02 RX ORDER — ASPIRIN 81 MG/1
81 TABLET ORAL DAILY
Status: DISCONTINUED | OUTPATIENT
Start: 2019-02-02 | End: 2019-02-03 | Stop reason: HOSPADM

## 2019-02-02 RX ORDER — METHYLPREDNISOLONE SODIUM SUCCINATE 125 MG/2ML
125 INJECTION, POWDER, LYOPHILIZED, FOR SOLUTION INTRAMUSCULAR; INTRAVENOUS ONCE
Status: COMPLETED | OUTPATIENT
Start: 2019-02-02 | End: 2019-02-02

## 2019-02-02 RX ORDER — IPRATROPIUM BROMIDE AND ALBUTEROL SULFATE 2.5; .5 MG/3ML; MG/3ML
1 SOLUTION RESPIRATORY (INHALATION)
Status: DISCONTINUED | OUTPATIENT
Start: 2019-02-03 | End: 2019-02-02

## 2019-02-02 RX ORDER — METHYLPREDNISOLONE SODIUM SUCCINATE 40 MG/ML
40 INJECTION, POWDER, LYOPHILIZED, FOR SOLUTION INTRAMUSCULAR; INTRAVENOUS DAILY
Status: DISCONTINUED | OUTPATIENT
Start: 2019-02-02 | End: 2019-02-03 | Stop reason: HOSPADM

## 2019-02-02 RX ORDER — SODIUM CHLORIDE 0.9 % (FLUSH) 0.9 %
10 SYRINGE (ML) INJECTION PRN
Status: DISCONTINUED | OUTPATIENT
Start: 2019-02-02 | End: 2019-02-03 | Stop reason: HOSPADM

## 2019-02-02 RX ORDER — SODIUM CHLORIDE 0.9 % (FLUSH) 0.9 %
10 SYRINGE (ML) INJECTION EVERY 12 HOURS SCHEDULED
Status: DISCONTINUED | OUTPATIENT
Start: 2019-02-02 | End: 2019-02-03 | Stop reason: HOSPADM

## 2019-02-02 RX ORDER — MIRTAZAPINE 15 MG/1
15 TABLET, FILM COATED ORAL NIGHTLY
Status: DISCONTINUED | OUTPATIENT
Start: 2019-02-02 | End: 2019-02-03 | Stop reason: HOSPADM

## 2019-02-02 RX ORDER — RISPERIDONE 3 MG/1
3 TABLET, FILM COATED ORAL 2 TIMES DAILY
Status: DISCONTINUED | OUTPATIENT
Start: 2019-02-02 | End: 2019-02-02

## 2019-02-02 RX ORDER — ONDANSETRON 2 MG/ML
4 INJECTION INTRAMUSCULAR; INTRAVENOUS EVERY 6 HOURS PRN
Status: DISCONTINUED | OUTPATIENT
Start: 2019-02-02 | End: 2019-02-03 | Stop reason: HOSPADM

## 2019-02-02 RX ORDER — IPRATROPIUM BROMIDE AND ALBUTEROL SULFATE 2.5; .5 MG/3ML; MG/3ML
1 SOLUTION RESPIRATORY (INHALATION) EVERY 6 HOURS PRN
Status: DISCONTINUED | OUTPATIENT
Start: 2019-02-02 | End: 2019-02-03 | Stop reason: HOSPADM

## 2019-02-02 RX ADMIN — MIRTAZAPINE 15 MG: 15 TABLET, FILM COATED ORAL at 21:28

## 2019-02-02 RX ADMIN — EFAVIRENZ, EMTRICITABINE, AND TENOFOVIR DISOPROXIL FUMARATE 1 TABLET: 600; 200; 300 TABLET, FILM COATED ORAL at 21:28

## 2019-02-02 RX ADMIN — BUPROPION HYDROCHLORIDE 100 MG: 100 TABLET, FILM COATED ORAL at 21:27

## 2019-02-02 RX ADMIN — HYDROCODONE BITARTRATE AND ACETAMINOPHEN 1 TABLET: 5; 325 TABLET ORAL at 22:57

## 2019-02-02 RX ADMIN — IPRATROPIUM BROMIDE AND ALBUTEROL SULFATE 1 AMPULE: .5; 3 SOLUTION RESPIRATORY (INHALATION) at 23:48

## 2019-02-02 RX ADMIN — GUAIFENESIN 600 MG: 600 TABLET, EXTENDED RELEASE ORAL at 21:28

## 2019-02-02 RX ADMIN — OXYBUTYNIN CHLORIDE 5 MG: 5 TABLET ORAL at 16:49

## 2019-02-02 RX ADMIN — LURASIDONE HYDROCHLORIDE 40 MG: 40 TABLET, FILM COATED ORAL at 16:49

## 2019-02-02 RX ADMIN — METHYLPREDNISOLONE SODIUM SUCCINATE 125 MG: 125 INJECTION, POWDER, FOR SOLUTION INTRAMUSCULAR; INTRAVENOUS at 10:50

## 2019-02-02 RX ADMIN — OXYBUTYNIN CHLORIDE 5 MG: 5 TABLET ORAL at 21:28

## 2019-02-02 RX ADMIN — IPRATROPIUM BROMIDE AND ALBUTEROL SULFATE 1 AMPULE: .5; 3 SOLUTION RESPIRATORY (INHALATION) at 10:43

## 2019-02-02 RX ADMIN — Medication 2 PUFF: at 20:56

## 2019-02-02 RX ADMIN — ALBUTEROL SULFATE 0.5 MG/KG/HR: 2.5 SOLUTION RESPIRATORY (INHALATION) at 10:58

## 2019-02-02 RX ADMIN — DOXYCYCLINE HYCLATE 100 MG: 100 TABLET, COATED ORAL at 16:49

## 2019-02-02 RX ADMIN — SODIUM CHLORIDE 1000 ML: 9 INJECTION, SOLUTION INTRAVENOUS at 10:50

## 2019-02-02 RX ADMIN — ALBUTEROL SULFATE 2.5 MG: 2.5 SOLUTION RESPIRATORY (INHALATION) at 15:56

## 2019-02-02 RX ADMIN — TRAZODONE HYDROCHLORIDE 50 MG: 50 TABLET ORAL at 21:28

## 2019-02-02 RX ADMIN — HYDROCODONE BITARTRATE AND ACETAMINOPHEN 1 TABLET: 5; 325 TABLET ORAL at 16:25

## 2019-02-02 RX ADMIN — ENOXAPARIN SODIUM 40 MG: 40 INJECTION SUBCUTANEOUS at 16:50

## 2019-02-02 RX ADMIN — Medication 10 ML: at 21:00

## 2019-02-02 RX ADMIN — ALBUTEROL SULFATE 2.5 MG: 2.5 SOLUTION RESPIRATORY (INHALATION) at 20:55

## 2019-02-02 RX ADMIN — RISPERIDONE 3 MG: 3 TABLET ORAL at 21:28

## 2019-02-02 ASSESSMENT — PAIN DESCRIPTION - PAIN TYPE
TYPE: ACUTE PAIN
TYPE: ACUTE PAIN

## 2019-02-02 ASSESSMENT — ENCOUNTER SYMPTOMS
COUGH: 1
EYE DISCHARGE: 0
SHORTNESS OF BREATH: 1
WHEEZING: 1
ABDOMINAL PAIN: 0

## 2019-02-02 ASSESSMENT — PAIN DESCRIPTION - ONSET: ONSET: ON-GOING

## 2019-02-02 ASSESSMENT — PAIN SCALES - GENERAL
PAINLEVEL_OUTOF10: 9
PAINLEVEL_OUTOF10: 9
PAINLEVEL_OUTOF10: 0
PAINLEVEL_OUTOF10: 8

## 2019-02-02 ASSESSMENT — PAIN DESCRIPTION - DESCRIPTORS: DESCRIPTORS: HEADACHE

## 2019-02-02 ASSESSMENT — PAIN DESCRIPTION - LOCATION
LOCATION: HEAD
LOCATION: HEAD

## 2019-02-02 ASSESSMENT — PAIN DESCRIPTION - FREQUENCY: FREQUENCY: CONTINUOUS

## 2019-02-02 ASSESSMENT — PAIN DESCRIPTION - PROGRESSION: CLINICAL_PROGRESSION: GRADUALLY WORSENING

## 2019-02-02 ASSESSMENT — PAIN - FUNCTIONAL ASSESSMENT: PAIN_FUNCTIONAL_ASSESSMENT: ACTIVITIES ARE NOT PREVENTED

## 2019-02-03 ENCOUNTER — TELEPHONE (OUTPATIENT)
Dept: FAMILY MEDICINE CLINIC | Age: 59
End: 2019-02-03

## 2019-02-03 VITALS
WEIGHT: 169.8 LBS | SYSTOLIC BLOOD PRESSURE: 137 MMHG | OXYGEN SATURATION: 92 % | HEART RATE: 98 BPM | RESPIRATION RATE: 24 BRPM | HEIGHT: 65 IN | TEMPERATURE: 98.3 F | DIASTOLIC BLOOD PRESSURE: 65 MMHG | BODY MASS INDEX: 28.29 KG/M2

## 2019-02-03 LAB
ALBUMIN SERPL-MCNC: 3.8 G/DL (ref 3.5–5.1)
ALP BLD-CCNC: 136 U/L (ref 38–126)
ALT SERPL-CCNC: 13 U/L (ref 11–66)
ANION GAP SERPL CALCULATED.3IONS-SCNC: 14 MEQ/L (ref 8–16)
AST SERPL-CCNC: 20 U/L (ref 5–40)
BASOPHILS # BLD: 0.5 %
BASOPHILS ABSOLUTE: 0 THOU/MM3 (ref 0–0.1)
BILIRUB SERPL-MCNC: < 0.2 MG/DL (ref 0.3–1.2)
BILIRUBIN DIRECT: < 0.2 MG/DL (ref 0–0.3)
BUN BLDV-MCNC: 9 MG/DL (ref 7–22)
CALCIUM SERPL-MCNC: 8.4 MG/DL (ref 8.5–10.5)
CHLORIDE BLD-SCNC: 106 MEQ/L (ref 98–111)
CO2: 19 MEQ/L (ref 23–33)
CREAT SERPL-MCNC: 0.8 MG/DL (ref 0.4–1.2)
EOSINOPHIL # BLD: 0.5 %
EOSINOPHILS ABSOLUTE: 0 THOU/MM3 (ref 0–0.4)
ERYTHROCYTE [DISTWIDTH] IN BLOOD BY AUTOMATED COUNT: 13.2 % (ref 11.5–14.5)
ERYTHROCYTE [DISTWIDTH] IN BLOOD BY AUTOMATED COUNT: 48.4 FL (ref 35–45)
GFR SERPL CREATININE-BSD FRML MDRD: 74 ML/MIN/1.73M2
GLUCOSE BLD-MCNC: 106 MG/DL (ref 70–108)
HCT VFR BLD CALC: 33.1 % (ref 37–47)
HEMOGLOBIN: 10.7 GM/DL (ref 12–16)
IMMATURE GRANS (ABS): 0.03 THOU/MM3 (ref 0–0.07)
IMMATURE GRANULOCYTES: 0.5 %
LYMPHOCYTES # BLD: 30.7 %
LYMPHOCYTES ABSOLUTE: 1.9 THOU/MM3 (ref 1–4.8)
MAGNESIUM: 1.7 MG/DL (ref 1.6–2.4)
MCH RBC QN AUTO: 32.3 PG (ref 26–33)
MCHC RBC AUTO-ENTMCNC: 32.3 GM/DL (ref 32.2–35.5)
MCV RBC AUTO: 100 FL (ref 81–99)
MONOCYTES # BLD: 9.7 %
MONOCYTES ABSOLUTE: 0.6 THOU/MM3 (ref 0.4–1.3)
NUCLEATED RED BLOOD CELLS: 0 /100 WBC
PLATELET # BLD: 202 THOU/MM3 (ref 130–400)
PMV BLD AUTO: 10.1 FL (ref 9.4–12.4)
POTASSIUM REFLEX MAGNESIUM: 3.4 MEQ/L (ref 3.5–5.2)
RBC # BLD: 3.31 MILL/MM3 (ref 4.2–5.4)
SEG NEUTROPHILS: 58.1 %
SEGMENTED NEUTROPHILS ABSOLUTE COUNT: 3.6 THOU/MM3 (ref 1.8–7.7)
SODIUM BLD-SCNC: 139 MEQ/L (ref 135–145)
TOTAL PROTEIN: 6.1 G/DL (ref 6.1–8)
WBC # BLD: 6.2 THOU/MM3 (ref 4.8–10.8)

## 2019-02-03 PROCEDURE — 6370000000 HC RX 637 (ALT 250 FOR IP): Performed by: HOSPITALIST

## 2019-02-03 PROCEDURE — 99238 HOSP IP/OBS DSCHRG MGMT 30/<: CPT | Performed by: HOSPITALIST

## 2019-02-03 PROCEDURE — 85025 COMPLETE CBC W/AUTO DIFF WBC: CPT

## 2019-02-03 PROCEDURE — 94761 N-INVAS EAR/PLS OXIMETRY MLT: CPT

## 2019-02-03 PROCEDURE — 96376 TX/PRO/DX INJ SAME DRUG ADON: CPT

## 2019-02-03 PROCEDURE — 80048 BASIC METABOLIC PNL TOTAL CA: CPT

## 2019-02-03 PROCEDURE — G0378 HOSPITAL OBSERVATION PER HR: HCPCS

## 2019-02-03 PROCEDURE — 6360000002 HC RX W HCPCS: Performed by: INTERNAL MEDICINE

## 2019-02-03 PROCEDURE — 94640 AIRWAY INHALATION TREATMENT: CPT

## 2019-02-03 PROCEDURE — 80076 HEPATIC FUNCTION PANEL: CPT

## 2019-02-03 PROCEDURE — 2700000000 HC OXYGEN THERAPY PER DAY

## 2019-02-03 PROCEDURE — 6370000000 HC RX 637 (ALT 250 FOR IP): Performed by: INTERNAL MEDICINE

## 2019-02-03 PROCEDURE — 83735 ASSAY OF MAGNESIUM: CPT

## 2019-02-03 PROCEDURE — 2709999900 HC NON-CHARGEABLE SUPPLY

## 2019-02-03 PROCEDURE — 36415 COLL VENOUS BLD VENIPUNCTURE: CPT

## 2019-02-03 PROCEDURE — 2580000003 HC RX 258: Performed by: INTERNAL MEDICINE

## 2019-02-03 RX ORDER — FLUTICASONE PROPIONATE 50 MCG
1 SPRAY, SUSPENSION (ML) NASAL DAILY
Status: DISCONTINUED | OUTPATIENT
Start: 2019-02-03 | End: 2019-02-03 | Stop reason: HOSPADM

## 2019-02-03 RX ORDER — PREDNISONE 20 MG/1
40 TABLET ORAL DAILY
Qty: 8 TABLET | Refills: 0 | Status: SHIPPED | OUTPATIENT
Start: 2019-02-03 | End: 2019-02-07

## 2019-02-03 RX ORDER — DOXYCYCLINE HYCLATE 100 MG
100 TABLET ORAL EVERY 12 HOURS
Qty: 12 TABLET | Refills: 0 | Status: SHIPPED | OUTPATIENT
Start: 2019-02-03 | End: 2019-02-09

## 2019-02-03 RX ORDER — POTASSIUM CHLORIDE 20 MEQ/1
40 TABLET, EXTENDED RELEASE ORAL ONCE
Status: COMPLETED | OUTPATIENT
Start: 2019-02-03 | End: 2019-02-03

## 2019-02-03 RX ADMIN — ASPIRIN 81 MG: 81 TABLET, COATED ORAL at 08:16

## 2019-02-03 RX ADMIN — FLUTICASONE PROPIONATE 1 SPRAY: 50 SPRAY, METERED NASAL at 08:17

## 2019-02-03 RX ADMIN — POTASSIUM CHLORIDE 40 MEQ: 20 TABLET, EXTENDED RELEASE ORAL at 12:10

## 2019-02-03 RX ADMIN — LURASIDONE HYDROCHLORIDE 40 MG: 40 TABLET, FILM COATED ORAL at 08:16

## 2019-02-03 RX ADMIN — OXYBUTYNIN CHLORIDE 5 MG: 5 TABLET ORAL at 08:16

## 2019-02-03 RX ADMIN — Medication 10 ML: at 08:16

## 2019-02-03 RX ADMIN — DOXYCYCLINE HYCLATE 100 MG: 100 TABLET, COATED ORAL at 03:08

## 2019-02-03 RX ADMIN — HYDROCODONE BITARTRATE AND ACETAMINOPHEN 1 TABLET: 5; 325 TABLET ORAL at 09:01

## 2019-02-03 RX ADMIN — BUPROPION HYDROCHLORIDE 100 MG: 100 TABLET, FILM COATED ORAL at 08:16

## 2019-02-03 RX ADMIN — RISPERIDONE 3 MG: 3 TABLET ORAL at 08:16

## 2019-02-03 RX ADMIN — GUAIFENESIN 600 MG: 600 TABLET, EXTENDED RELEASE ORAL at 08:16

## 2019-02-03 RX ADMIN — Medication 2 PUFF: at 07:51

## 2019-02-03 RX ADMIN — METHYLPREDNISOLONE SODIUM SUCCINATE 40 MG: 40 INJECTION, POWDER, FOR SOLUTION INTRAMUSCULAR; INTRAVENOUS at 08:17

## 2019-02-03 RX ADMIN — PAROXETINE HYDROCHLORIDE 30 MG: 30 TABLET, FILM COATED ORAL at 08:16

## 2019-02-03 RX ADMIN — PANTOPRAZOLE SODIUM 40 MG: 40 TABLET, DELAYED RELEASE ORAL at 06:21

## 2019-02-03 RX ADMIN — ALBUTEROL SULFATE 2.5 MG: 2.5 SOLUTION RESPIRATORY (INHALATION) at 07:51

## 2019-02-03 RX ADMIN — TIOTROPIUM BROMIDE 18 MCG: 18 CAPSULE ORAL; RESPIRATORY (INHALATION) at 07:51

## 2019-02-03 ASSESSMENT — PAIN SCALES - GENERAL
PAINLEVEL_OUTOF10: 5
PAINLEVEL_OUTOF10: 7

## 2019-02-03 ASSESSMENT — PAIN DESCRIPTION - PAIN TYPE: TYPE: ACUTE PAIN;CHRONIC PAIN

## 2019-02-03 ASSESSMENT — PAIN DESCRIPTION - LOCATION: LOCATION: BACK

## 2019-02-04 ENCOUNTER — TELEPHONE (OUTPATIENT)
Dept: FAMILY MEDICINE CLINIC | Age: 59
End: 2019-02-04

## 2019-02-07 LAB — BLOOD CULTURE, ROUTINE: NORMAL

## 2019-02-08 LAB — BLOOD CULTURE, ROUTINE: NORMAL

## 2019-02-13 ENCOUNTER — TELEPHONE (OUTPATIENT)
Dept: FAMILY MEDICINE CLINIC | Age: 59
End: 2019-02-13

## 2019-02-18 ENCOUNTER — OFFICE VISIT (OUTPATIENT)
Dept: FAMILY MEDICINE CLINIC | Age: 59
End: 2019-02-18
Payer: COMMERCIAL

## 2019-02-18 VITALS
RESPIRATION RATE: 16 BRPM | SYSTOLIC BLOOD PRESSURE: 128 MMHG | HEART RATE: 74 BPM | WEIGHT: 165.7 LBS | BODY MASS INDEX: 27.61 KG/M2 | HEIGHT: 65 IN | DIASTOLIC BLOOD PRESSURE: 74 MMHG

## 2019-02-18 DIAGNOSIS — R91.1 PULMONARY NODULE: ICD-10-CM

## 2019-02-18 DIAGNOSIS — D84.9 IMMUNOCOMPROMISED STATE (HCC): Chronic | ICD-10-CM

## 2019-02-18 DIAGNOSIS — J44.1 COPD WITH ACUTE EXACERBATION (HCC): Primary | ICD-10-CM

## 2019-02-18 PROCEDURE — 4004F PT TOBACCO SCREEN RCVD TLK: CPT | Performed by: FAMILY MEDICINE

## 2019-02-18 PROCEDURE — G8417 CALC BMI ABV UP PARAM F/U: HCPCS | Performed by: FAMILY MEDICINE

## 2019-02-18 PROCEDURE — G8427 DOCREV CUR MEDS BY ELIG CLIN: HCPCS | Performed by: FAMILY MEDICINE

## 2019-02-18 PROCEDURE — 3023F SPIROM DOC REV: CPT | Performed by: FAMILY MEDICINE

## 2019-02-18 PROCEDURE — G8926 SPIRO NO PERF OR DOC: HCPCS | Performed by: FAMILY MEDICINE

## 2019-02-18 PROCEDURE — 99214 OFFICE O/P EST MOD 30 MIN: CPT | Performed by: FAMILY MEDICINE

## 2019-02-18 PROCEDURE — G8598 ASA/ANTIPLAT THER USED: HCPCS | Performed by: FAMILY MEDICINE

## 2019-02-18 PROCEDURE — G8482 FLU IMMUNIZE ORDER/ADMIN: HCPCS | Performed by: FAMILY MEDICINE

## 2019-02-18 PROCEDURE — 3017F COLORECTAL CA SCREEN DOC REV: CPT | Performed by: FAMILY MEDICINE

## 2019-02-18 RX ORDER — PREDNISONE 20 MG/1
20 TABLET ORAL 2 TIMES DAILY
Qty: 10 TABLET | Refills: 0 | Status: SHIPPED | OUTPATIENT
Start: 2019-02-18 | End: 2019-02-23

## 2019-02-18 RX ORDER — DOXYCYCLINE HYCLATE 100 MG
100 TABLET ORAL 2 TIMES DAILY
Qty: 14 TABLET | Refills: 0 | Status: SHIPPED | OUTPATIENT
Start: 2019-02-18 | End: 2019-02-25

## 2019-02-18 ASSESSMENT — ENCOUNTER SYMPTOMS
WHEEZING: 1
GASTROINTESTINAL NEGATIVE: 1
COUGH: 1
SHORTNESS OF BREATH: 1
CHEST TIGHTNESS: 1

## 2019-02-26 ENCOUNTER — OFFICE VISIT (OUTPATIENT)
Dept: UROLOGY | Age: 59
End: 2019-02-26
Payer: COMMERCIAL

## 2019-02-26 VITALS
WEIGHT: 168 LBS | SYSTOLIC BLOOD PRESSURE: 122 MMHG | BODY MASS INDEX: 27.99 KG/M2 | DIASTOLIC BLOOD PRESSURE: 64 MMHG | HEIGHT: 65 IN

## 2019-02-26 DIAGNOSIS — R32 URINARY INCONTINENCE, UNSPECIFIED TYPE: Primary | ICD-10-CM

## 2019-02-26 DIAGNOSIS — N81.10 FEMALE BLADDER PROLAPSE: ICD-10-CM

## 2019-02-26 LAB — POST VOID RESIDUAL (PVR): 15 ML

## 2019-02-26 PROCEDURE — 1036F TOBACCO NON-USER: CPT | Performed by: NURSE PRACTITIONER

## 2019-02-26 PROCEDURE — G8482 FLU IMMUNIZE ORDER/ADMIN: HCPCS | Performed by: NURSE PRACTITIONER

## 2019-02-26 PROCEDURE — 51798 US URINE CAPACITY MEASURE: CPT | Performed by: NURSE PRACTITIONER

## 2019-02-26 PROCEDURE — G8417 CALC BMI ABV UP PARAM F/U: HCPCS | Performed by: NURSE PRACTITIONER

## 2019-02-26 PROCEDURE — G8598 ASA/ANTIPLAT THER USED: HCPCS | Performed by: NURSE PRACTITIONER

## 2019-02-26 PROCEDURE — G8427 DOCREV CUR MEDS BY ELIG CLIN: HCPCS | Performed by: NURSE PRACTITIONER

## 2019-02-26 PROCEDURE — 3017F COLORECTAL CA SCREEN DOC REV: CPT | Performed by: NURSE PRACTITIONER

## 2019-02-26 PROCEDURE — 99214 OFFICE O/P EST MOD 30 MIN: CPT | Performed by: NURSE PRACTITIONER

## 2019-03-13 RX ORDER — IBUPROFEN 800 MG/1
800 TABLET ORAL EVERY 8 HOURS PRN
Qty: 90 TABLET | Refills: 0 | OUTPATIENT
Start: 2019-03-13

## 2019-03-19 ENCOUNTER — TELEPHONE (OUTPATIENT)
Dept: FAMILY MEDICINE CLINIC | Age: 59
End: 2019-03-19

## 2019-03-19 ENCOUNTER — HOSPITAL ENCOUNTER (EMERGENCY)
Age: 59
Discharge: HOME | End: 2019-03-19
Attending: EMERGENCY MEDICINE
Payer: COMMERCIAL

## 2019-03-19 ENCOUNTER — APPOINTMENT (OUTPATIENT)
Dept: GENERAL RADIOLOGY | Age: 59
End: 2019-03-19
Payer: COMMERCIAL

## 2019-03-19 VITALS
HEART RATE: 73 BPM | TEMPERATURE: 97.4 F | RESPIRATION RATE: 17 BRPM | WEIGHT: 159 LBS | HEIGHT: 65 IN | DIASTOLIC BLOOD PRESSURE: 80 MMHG | BODY MASS INDEX: 26.49 KG/M2 | OXYGEN SATURATION: 96 % | SYSTOLIC BLOOD PRESSURE: 106 MMHG

## 2019-03-19 DIAGNOSIS — I95.1 ORTHOSTATIC HYPOTENSION: ICD-10-CM

## 2019-03-19 DIAGNOSIS — R42 ORTHOSTATIC DIZZINESS: Primary | ICD-10-CM

## 2019-03-19 LAB
ANION GAP SERPL CALCULATED.3IONS-SCNC: 12 MEQ/L (ref 8–16)
BASOPHILS # BLD: 1.8 %
BASOPHILS ABSOLUTE: 0.1 THOU/MM3 (ref 0–0.1)
BUN BLDV-MCNC: 15 MG/DL (ref 7–22)
CALCIUM SERPL-MCNC: 8.8 MG/DL (ref 8.5–10.5)
CHLORIDE BLD-SCNC: 101 MEQ/L (ref 98–111)
CO2: 26 MEQ/L (ref 23–33)
CREAT SERPL-MCNC: 0.8 MG/DL (ref 0.4–1.2)
D-DIMER QUANTITATIVE: 239 NG/ML FEU (ref 0–500)
EKG ATRIAL RATE: 65 BPM
EKG P AXIS: 80 DEGREES
EKG P-R INTERVAL: 178 MS
EKG Q-T INTERVAL: 462 MS
EKG QRS DURATION: 106 MS
EKG QTC CALCULATION (BAZETT): 480 MS
EKG R AXIS: 73 DEGREES
EKG T AXIS: 66 DEGREES
EKG VENTRICULAR RATE: 65 BPM
EOSINOPHIL # BLD: 2.9 %
EOSINOPHILS ABSOLUTE: 0.1 THOU/MM3 (ref 0–0.4)
ERYTHROCYTE [DISTWIDTH] IN BLOOD BY AUTOMATED COUNT: 12.9 % (ref 11.5–14.5)
ERYTHROCYTE [DISTWIDTH] IN BLOOD BY AUTOMATED COUNT: 44.5 FL (ref 35–45)
GFR SERPL CREATININE-BSD FRML MDRD: 73 ML/MIN/1.73M2
GLUCOSE BLD-MCNC: 124 MG/DL (ref 70–108)
GLUCOSE BLD-MCNC: 130 MG/DL (ref 70–108)
HCT VFR BLD CALC: 39.8 % (ref 37–47)
HEMOGLOBIN: 14 GM/DL (ref 12–16)
IMMATURE GRANS (ABS): 0.02 THOU/MM3 (ref 0–0.07)
IMMATURE GRANULOCYTES: 0.4 %
LYMPHOCYTES # BLD: 51.4 %
LYMPHOCYTES ABSOLUTE: 2.6 THOU/MM3 (ref 1–4.8)
MCH RBC QN AUTO: 33.2 PG (ref 26–33)
MCHC RBC AUTO-ENTMCNC: 35.2 GM/DL (ref 32.2–35.5)
MCV RBC AUTO: 94.3 FL (ref 81–99)
MONOCYTES # BLD: 8.8 %
MONOCYTES ABSOLUTE: 0.4 THOU/MM3 (ref 0.4–1.3)
NUCLEATED RED BLOOD CELLS: 0 /100 WBC
OSMOLALITY CALCULATION: 279.8 MOSMOL/KG (ref 275–300)
PLATELET # BLD: 254 THOU/MM3 (ref 130–400)
PMV BLD AUTO: 9.7 FL (ref 9.4–12.4)
POTASSIUM REFLEX MAGNESIUM: 3.9 MEQ/L (ref 3.5–5.2)
POTASSIUM SERPL-SCNC: 3.9 MEQ/L (ref 3.5–5.2)
PRO-BNP: 82.8 PG/ML (ref 0–900)
RBC # BLD: 4.22 MILL/MM3 (ref 4.2–5.4)
SEG NEUTROPHILS: 34.7 %
SEGMENTED NEUTROPHILS ABSOLUTE COUNT: 1.8 THOU/MM3 (ref 1.8–7.7)
SODIUM BLD-SCNC: 139 MEQ/L (ref 135–145)
TROPONIN T: < 0.01 NG/ML
TROPONIN T: < 0.01 NG/ML
WBC # BLD: 5.1 THOU/MM3 (ref 4.8–10.8)

## 2019-03-19 PROCEDURE — 99284 EMERGENCY DEPT VISIT MOD MDM: CPT

## 2019-03-19 PROCEDURE — 85025 COMPLETE CBC W/AUTO DIFF WBC: CPT

## 2019-03-19 PROCEDURE — 85379 FIBRIN DEGRADATION QUANT: CPT

## 2019-03-19 PROCEDURE — 71045 X-RAY EXAM CHEST 1 VIEW: CPT

## 2019-03-19 PROCEDURE — 82948 REAGENT STRIP/BLOOD GLUCOSE: CPT

## 2019-03-19 PROCEDURE — 93010 ELECTROCARDIOGRAM REPORT: CPT | Performed by: INTERNAL MEDICINE

## 2019-03-19 PROCEDURE — 80048 BASIC METABOLIC PNL TOTAL CA: CPT

## 2019-03-19 PROCEDURE — 93005 ELECTROCARDIOGRAM TRACING: CPT | Performed by: EMERGENCY MEDICINE

## 2019-03-19 PROCEDURE — 2580000003 HC RX 258: Performed by: EMERGENCY MEDICINE

## 2019-03-19 PROCEDURE — 36415 COLL VENOUS BLD VENIPUNCTURE: CPT

## 2019-03-19 PROCEDURE — 83880 ASSAY OF NATRIURETIC PEPTIDE: CPT

## 2019-03-19 PROCEDURE — 84484 ASSAY OF TROPONIN QUANT: CPT

## 2019-03-19 RX ORDER — 0.9 % SODIUM CHLORIDE 0.9 %
1000 INTRAVENOUS SOLUTION INTRAVENOUS ONCE
Status: COMPLETED | OUTPATIENT
Start: 2019-03-19 | End: 2019-03-19

## 2019-03-19 RX ADMIN — SODIUM CHLORIDE 1000 ML: 9 INJECTION, SOLUTION INTRAVENOUS at 02:04

## 2019-03-19 ASSESSMENT — ENCOUNTER SYMPTOMS
RHINORRHEA: 0
COUGH: 0
DIARRHEA: 0
SORE THROAT: 0
VOMITING: 0
ABDOMINAL PAIN: 1
EYE PAIN: 0
WHEEZING: 0
NAUSEA: 1
EYE DISCHARGE: 0
BACK PAIN: 0
SHORTNESS OF BREATH: 0

## 2019-03-20 ENCOUNTER — TELEPHONE (OUTPATIENT)
Dept: FAMILY MEDICINE CLINIC | Age: 59
End: 2019-03-20

## 2019-03-20 ENCOUNTER — CARE COORDINATION (OUTPATIENT)
Dept: CARE COORDINATION | Age: 59
End: 2019-03-20

## 2019-03-20 RX ORDER — ADHESIVE BANDAGE 3/4"
BANDAGE TOPICAL
Qty: 1 EACH | Refills: 0 | Status: SHIPPED | OUTPATIENT
Start: 2019-03-20 | End: 2021-03-09

## 2019-03-20 ASSESSMENT — ENCOUNTER SYMPTOMS: DYSPNEA ASSOCIATED WITH: EXERTION

## 2019-03-25 ENCOUNTER — CARE COORDINATION (OUTPATIENT)
Dept: CARE COORDINATION | Age: 59
End: 2019-03-25

## 2019-03-26 ENCOUNTER — OFFICE VISIT (OUTPATIENT)
Dept: FAMILY MEDICINE CLINIC | Age: 59
End: 2019-03-26
Payer: COMMERCIAL

## 2019-03-26 VITALS
BODY MASS INDEX: 27.21 KG/M2 | SYSTOLIC BLOOD PRESSURE: 126 MMHG | TEMPERATURE: 97.8 F | DIASTOLIC BLOOD PRESSURE: 82 MMHG | HEART RATE: 68 BPM | RESPIRATION RATE: 12 BRPM | WEIGHT: 163.5 LBS | OXYGEN SATURATION: 96 %

## 2019-03-26 DIAGNOSIS — R50.9 FEBRILE ILLNESS, ACUTE: Primary | ICD-10-CM

## 2019-03-26 LAB
INFLUENZA A ANTIBODY: NORMAL
INFLUENZA B ANTIBODY: NORMAL

## 2019-03-26 PROCEDURE — G8598 ASA/ANTIPLAT THER USED: HCPCS | Performed by: FAMILY MEDICINE

## 2019-03-26 PROCEDURE — 1036F TOBACCO NON-USER: CPT | Performed by: FAMILY MEDICINE

## 2019-03-26 PROCEDURE — 99213 OFFICE O/P EST LOW 20 MIN: CPT | Performed by: FAMILY MEDICINE

## 2019-03-26 PROCEDURE — G8427 DOCREV CUR MEDS BY ELIG CLIN: HCPCS | Performed by: FAMILY MEDICINE

## 2019-03-26 PROCEDURE — G8482 FLU IMMUNIZE ORDER/ADMIN: HCPCS | Performed by: FAMILY MEDICINE

## 2019-03-26 PROCEDURE — 87804 INFLUENZA ASSAY W/OPTIC: CPT | Performed by: FAMILY MEDICINE

## 2019-03-26 PROCEDURE — G8417 CALC BMI ABV UP PARAM F/U: HCPCS | Performed by: FAMILY MEDICINE

## 2019-03-26 PROCEDURE — 3017F COLORECTAL CA SCREEN DOC REV: CPT | Performed by: FAMILY MEDICINE

## 2019-03-26 ASSESSMENT — ENCOUNTER SYMPTOMS
WHEEZING: 1
SINUS PRESSURE: 1
RHINORRHEA: 1
COUGH: 1
GASTROINTESTINAL NEGATIVE: 1

## 2019-03-28 RX ORDER — OXYBUTYNIN CHLORIDE 5 MG/1
5 TABLET ORAL 3 TIMES DAILY
Qty: 270 TABLET | Refills: 1 | Status: SHIPPED | OUTPATIENT
Start: 2019-03-28 | End: 2019-06-27

## 2019-04-04 ENCOUNTER — TELEPHONE (OUTPATIENT)
Dept: FAMILY MEDICINE CLINIC | Age: 59
End: 2019-04-04

## 2019-04-04 ENCOUNTER — APPOINTMENT (OUTPATIENT)
Dept: CT IMAGING | Age: 59
End: 2019-04-04
Payer: COMMERCIAL

## 2019-04-04 ENCOUNTER — HOSPITAL ENCOUNTER (OUTPATIENT)
Dept: PULMONOLOGY | Age: 59
Discharge: HOME | End: 2019-04-04
Payer: COMMERCIAL

## 2019-04-04 DIAGNOSIS — J44.9 COPD, MODERATE (HCC): ICD-10-CM

## 2019-04-04 DIAGNOSIS — F31.9 BIPOLAR DISEASE, CHRONIC (HCC): ICD-10-CM

## 2019-04-04 DIAGNOSIS — R91.1 INCIDENTAL PULMONARY NODULE, GREATER THAN OR EQUAL TO 8MM: ICD-10-CM

## 2019-04-04 DIAGNOSIS — Z72.0 TOBACCO ABUSE: ICD-10-CM

## 2019-04-04 PROCEDURE — 94726 PLETHYSMOGRAPHY LUNG VOLUMES: CPT

## 2019-04-04 PROCEDURE — 94729 DIFFUSING CAPACITY: CPT

## 2019-04-04 PROCEDURE — 94060 EVALUATION OF WHEEZING: CPT

## 2019-04-04 PROCEDURE — 2709999900 HC NON-CHARGEABLE SUPPLY

## 2019-04-04 RX ORDER — IBUPROFEN 600 MG/1
600 TABLET ORAL 3 TIMES DAILY PRN
Qty: 90 TABLET | Refills: 5 | Status: ON HOLD | OUTPATIENT
Start: 2019-04-04 | End: 2019-06-30

## 2019-04-04 NOTE — LETTER
[address] [place] [address] [address]  Phone: [phone]  Fax: [address],         April 4, 2019     Patient: Alma Gayle   YOB: 1960   Date of Visit: 4/4/2019       To Whom It May Concern: It is my medical opinion that Pelon Prom would benefit from an emotional support animal.    If you have any questions or concerns, please don't hesitate to call.     Sincerely,        Maria Isabel Ruano, DO

## 2019-04-04 NOTE — TELEPHONE ENCOUNTER
Pt is requesting 2 letters. One letter stating she requires a 2 bedroom handicap appt for her medical needs. Second letter is for her to have a cat for emotional support.     Also needs a refill of her ibuprofen - [facility]

## 2019-04-11 ENCOUNTER — OFFICE VISIT (OUTPATIENT)
Dept: PULMONOLOGY | Age: 59
End: 2019-04-11
Payer: COMMERCIAL

## 2019-04-11 ENCOUNTER — HOSPITAL ENCOUNTER (OUTPATIENT)
Dept: CT IMAGING | Age: 59
Discharge: HOME | End: 2019-04-11
Payer: COMMERCIAL

## 2019-04-11 VITALS
TEMPERATURE: 100.7 F | OXYGEN SATURATION: 96 % | DIASTOLIC BLOOD PRESSURE: 76 MMHG | RESPIRATION RATE: 17 BRPM | HEIGHT: 65 IN | BODY MASS INDEX: 27.72 KG/M2 | HEART RATE: 87 BPM | WEIGHT: 166.4 LBS | SYSTOLIC BLOOD PRESSURE: 128 MMHG

## 2019-04-11 DIAGNOSIS — R91.1 INCIDENTAL PULMONARY NODULE, GREATER THAN OR EQUAL TO 8MM: ICD-10-CM

## 2019-04-11 DIAGNOSIS — F31.9 BIPOLAR DISEASE, CHRONIC (HCC): ICD-10-CM

## 2019-04-11 DIAGNOSIS — Z87.891 PERSONAL HISTORY OF TOBACCO USE: Primary | ICD-10-CM

## 2019-04-11 DIAGNOSIS — J43.9 PULMONARY EMPHYSEMA, UNSPECIFIED EMPHYSEMA TYPE (HCC): ICD-10-CM

## 2019-04-11 DIAGNOSIS — Z21 HIV POSITIVE (HCC): ICD-10-CM

## 2019-04-11 DIAGNOSIS — F20.9 SCHIZOPHRENIA, UNSPECIFIED TYPE (HCC): ICD-10-CM

## 2019-04-11 PROCEDURE — G8926 SPIRO NO PERF OR DOC: HCPCS | Performed by: NURSE PRACTITIONER

## 2019-04-11 PROCEDURE — 3023F SPIROM DOC REV: CPT | Performed by: NURSE PRACTITIONER

## 2019-04-11 PROCEDURE — 1036F TOBACCO NON-USER: CPT | Performed by: NURSE PRACTITIONER

## 2019-04-11 PROCEDURE — 3017F COLORECTAL CA SCREEN DOC REV: CPT | Performed by: NURSE PRACTITIONER

## 2019-04-11 PROCEDURE — 6360000004 HC RX CONTRAST MEDICATION: Performed by: NURSE PRACTITIONER

## 2019-04-11 PROCEDURE — G8598 ASA/ANTIPLAT THER USED: HCPCS | Performed by: NURSE PRACTITIONER

## 2019-04-11 PROCEDURE — 99213 OFFICE O/P EST LOW 20 MIN: CPT | Performed by: NURSE PRACTITIONER

## 2019-04-11 PROCEDURE — 71260 CT THORAX DX C+: CPT

## 2019-04-11 PROCEDURE — G8417 CALC BMI ABV UP PARAM F/U: HCPCS | Performed by: NURSE PRACTITIONER

## 2019-04-11 PROCEDURE — G8427 DOCREV CUR MEDS BY ELIG CLIN: HCPCS | Performed by: NURSE PRACTITIONER

## 2019-04-11 RX ADMIN — IOPAMIDOL 85 ML: 755 INJECTION, SOLUTION INTRAVENOUS at 07:22

## 2019-04-11 ASSESSMENT — ENCOUNTER SYMPTOMS
NAUSEA: 0
BACK PAIN: 0
CHEST TIGHTNESS: 0
STRIDOR: 0
COUGH: 0
WHEEZING: 0
SHORTNESS OF BREATH: 1
DIARRHEA: 0
EYES NEGATIVE: 1

## 2019-04-11 NOTE — PROGRESS NOTES
lurasidone (LATUDA) 40 MG TABS tablet Take 40 mg by mouth daily      TRAZODONE HCL PO Take by mouth      Multiple Vitamin (MULTI VITAMIN DAILY PO) Take by mouth      aclidinium (TUDORZA PRESSAIR) 400 MCG/ACT AEPB inhaler inhale 1 puff INTO THE LUNGS twice a day 1 each 11    mirtazapine (REMERON) 30 MG tablet Take 0.5-1 tablets by mouth nightly As needed 30 tablet 11    guaiFENesin (MUCINEX) 600 MG extended release tablet TAKE ONE (1) TABLET BY MOUTH TWICE DAILY 60 tablet 11    cetirizine-psuedoephedrine (RA CETIRI-D) 5-120 MG per extended release tablet take 1 tablet by mouth twice a day 60 tablet 11    ATRIPLA 600-200-300 MG per tablet TAKE 1 TABLET BY MOUTH NIGHTLY 30 tablet 2    OXYGEN Inhale 2 L into the lungs nightly       No current facility-administered medications for this visit. Facility-Administered Medications Ordered in Other Visits   Medication Dose Route Frequency Provider Last Rate Last Dose    0.9 % sodium chloride infusion   Intravenous Continuous Ne Herzog PA-C         .    CAROL   Review of Systems   Constitutional: Negative for activity change, appetite change, chills, fever and unexpected weight change. HENT: Negative for congestion and postnasal drip. Eyes: Negative. Respiratory: Positive for shortness of breath. Negative for cough, chest tightness, wheezing and stridor. Cardiovascular: Negative for chest pain and leg swelling. Gastrointestinal: Negative for diarrhea and nausea. Endocrine: Negative. Genitourinary: Negative. Musculoskeletal: Negative. Negative for arthralgias and back pain. Skin: Negative. Allergic/Immunologic: Positive for immunocompromised state. Neurological: Negative. Negative for dizziness and light-headedness. Psychiatric/Behavioral: Positive for decreased concentration. Negative for sleep disturbance and suicidal ideas. The patient is nervous/anxious. All other systems reviewed and are negative.        Physical exam /76 (Site: Left Upper Arm, Position: Sitting, Cuff Size: Medium Adult)   Pulse 87   Temp 100.7 °F (38.2 °C)   Resp 17   Ht 5' 5\" (1.651 m)   Wt 166 lb 6.4 oz (75.5 kg)   LMP  (Exact Date)   SpO2 96% Comment: on RA  BMI 27.69 kg/m²        Physical Exam   Constitutional: She is oriented to person, place, and time. She appears well-developed and well-nourished. No distress. Eyes: Conjunctivae are normal.   Neck: No tracheal deviation present. Pulmonary/Chest: Effort normal. No respiratory distress. She exhibits no tenderness. Abdominal: She exhibits no distension. There is no tenderness. Neurological: She is alert and oriented to person, place, and time. Skin: Skin is warm and dry. Psychiatric: Her speech is normal. Judgment and thought content normal. Her mood appears anxious. She is hyperactive. Cognition and memory are normal.   Nursing note and vitals reviewed. Patient so excited to get her results, she jumped up thanked me and left before I could examine her lungs or heart sounds. Test results   Lung Nodule Screening     [x] Qualifies    []Does not qualify   [] Declined    [x] Completed     FINDINGS:           The heart size is normal. No pericardial effusion. There is no significant coronary calcification.       There is a focal plaque near the left subclavian artery origin. No aortic aneurysm.       There is no gross abnormality of the pulmonary artery and its proximal branches.        There is no mediastinal, axillary or hilar adenopathy.        There are no pleural effusions.        Moderate emphysema. There are 2 areas of mild linear scarring or atelectasis within the right lower lobe. The posterior focus becomes somewhat nodular inferiorly where it measures 9 mm in greatest transverse dimension. This is similar to previous.       No suspicious osseous lesions are present.       Previous cholecystectomy.  Mild hypertrophy of the left adrenal gland is similar to previous.             Impression   Nodular scar is favored at the right lung base. No worrisome lung nodule. Patient can return to annual screening.            **This report has been created using voice recognition software. It may contain minor errors which are inherent in voice recognition technology. **       Final report electronically signed by Dr. Adwoa Quiroz on 4/11/2019 9:33 AM     Image compared to Jan 2019        Assessment      Diagnosis Orders   1. Personal history of tobacco use  CT Lung Screening (Annual)   2. Bipolar disease, chronic ([address]     3. Pulmonary emphysema, unspecified emphysema type ([address]     4. Schizophrenia, unspecified type ([address]     5. HIV positive ([facility]        Plan   Area of concern on previous CT c/w scarring, continue Annual screening- order placed  Continue Tudorza/ Breo  encouragement given to continue to abstain from smoking   Follows with psych for her Bipolar/ Schizophrenia.      Will see Cathi Fabian in: 1 year    Electronically signed by JAMEY Quintana CNP on 4/11/2019 at 1:58 PM

## 2019-04-16 ENCOUNTER — OFFICE VISIT (OUTPATIENT)
Dept: FAMILY MEDICINE CLINIC | Age: 59
End: 2019-04-16
Payer: COMMERCIAL

## 2019-04-16 VITALS
WEIGHT: 166.1 LBS | BODY MASS INDEX: 27.64 KG/M2 | HEART RATE: 77 BPM | DIASTOLIC BLOOD PRESSURE: 80 MMHG | SYSTOLIC BLOOD PRESSURE: 118 MMHG | OXYGEN SATURATION: 98 % | TEMPERATURE: 97.7 F | RESPIRATION RATE: 15 BRPM

## 2019-04-16 DIAGNOSIS — R60.0 PERIORBITAL EDEMA OF RIGHT EYE: Primary | ICD-10-CM

## 2019-04-16 DIAGNOSIS — H11.31 SUBCONJUNCTIVAL HEMORRHAGE OF RIGHT EYE: ICD-10-CM

## 2019-04-16 DIAGNOSIS — H00.012 HORDEOLUM EXTERNUM OF RIGHT LOWER EYELID: ICD-10-CM

## 2019-04-16 DIAGNOSIS — J06.9 ACUTE URI: ICD-10-CM

## 2019-04-16 PROCEDURE — 3017F COLORECTAL CA SCREEN DOC REV: CPT | Performed by: FAMILY MEDICINE

## 2019-04-16 PROCEDURE — G8598 ASA/ANTIPLAT THER USED: HCPCS | Performed by: FAMILY MEDICINE

## 2019-04-16 PROCEDURE — 99213 OFFICE O/P EST LOW 20 MIN: CPT | Performed by: FAMILY MEDICINE

## 2019-04-16 PROCEDURE — 1036F TOBACCO NON-USER: CPT | Performed by: FAMILY MEDICINE

## 2019-04-16 PROCEDURE — G8417 CALC BMI ABV UP PARAM F/U: HCPCS | Performed by: FAMILY MEDICINE

## 2019-04-16 PROCEDURE — G8427 DOCREV CUR MEDS BY ELIG CLIN: HCPCS | Performed by: FAMILY MEDICINE

## 2019-04-16 RX ORDER — POLYMYXIN B SULFATE AND TRIMETHOPRIM 1; 10000 MG/ML; [USP'U]/ML
1 SOLUTION OPHTHALMIC EVERY 6 HOURS
Qty: 1 BOTTLE | Refills: 0 | Status: SHIPPED | OUTPATIENT
Start: 2019-04-16 | End: 2019-04-23

## 2019-04-16 ASSESSMENT — ENCOUNTER SYMPTOMS
PHOTOPHOBIA: 0
EYE PAIN: 0
EYE REDNESS: 1
COUGH: 1
RHINORRHEA: 1
GASTROINTESTINAL NEGATIVE: 1
EYE DISCHARGE: 0

## 2019-04-16 NOTE — PROGRESS NOTES
Visit Information    Have you changed or started any medications since your last visit including any over-the-counter medicines, vitamins, or herbal medicines? no   Are you having any side effects from any of your medications? -  no  Have you stopped taking any of your medications? Is so, why? -  no    Have you seen any other physician or provider since your last visit? Yes - Records Obtained  Have you had any other diagnostic tests since your last visit? Yes - Records Obtained  Have you been seen in the emergency room and/or had an admission to a hospital since we last saw you? No  Have you had your routine dental cleaning in the past 6 months? na    Have you activated your [employer] account? If not, what are your barriers?  No: declined      Patient Care Team:  Katia Garay DO as PCP - General (Family Medicine)  Katia Garay DO as PCP - [facility] Attributed Provider  [facility], RN as Care Coordinator  Marcus Valenzuela MD as Consulting Physician (Pulmonary Disease)    Medical History Review  Past Medical, Family, and Social History reviewed and does not contribute to the patient presenting condition    Health Maintenance   Topic Date Due    Meningococcal (ACWY) Vaccine (1 - Risk 2-dose series) 07/18/1962    Pneumococcal 0-64 years Vaccine (1 of 3 - PCV13) 07/18/1966    Scheryl Littler (MMR) vaccine (1 of 2 - Risk 2-dose series) 07/18/1978    Hepatitis B Vaccine (1 of 3 - Risk 3-dose series) 07/18/1979    DTaP/Tdap/Td vaccine (1 - Tdap) 07/18/1979    Shingles Vaccine (1 of 2) 07/18/2010    Breast cancer screen  02/23/2018    Cervical cancer screen  02/23/2019    Diabetes screen  05/14/2021    Lipid screen  11/15/2023    Colon cancer screen colonoscopy  02/23/2026    Flu vaccine  Completed    Hepatitis C screen  Completed    Hib Vaccine  Aged Out    HPV vaccine  Aged Out

## 2019-04-16 NOTE — PROGRESS NOTES
Subjective:      Patient ID: David Wilks is a 62 y.o. female. HPI:    Chief Complaint   Patient presents with    Eye Problem     red, swollen, bilateral draining  present for 4 days not taking OTC medication for symptoms     Nasal Congestion     sinus pressure,pain     Cough     productive yellow/green drainage     Headache    Chills    Sweats    Generalized Body Aches     Pt here for right eye redness, swelling for the last 4 days. Has a lump on the lower eyelid. Pt also c/o sinus pressure and congestion during the time frame also. No fevers. Does have some occasional body aches, chills, and slight HA.     Patient Active Problem List   Diagnosis    Asthma    Hyperlipemia    Chronic pain syndrome    Bipolar disease, chronic (HCC)    Schizophrenia (HCC)    Tobacco abuse    PTSD (post-traumatic stress disorder)    Environmental allergies    Asthma with COPD ([address]    Chronic sinusitis    Vaginal dysplasia    COPD (chronic obstructive pulmonary disease) ([address]    Dizziness and giddiness    HIV (human immunodeficiency virus infection) ([address]    Vomiting    Acid reflux    Weight loss    Chronic cholecystitis with calculus    COPD with acute exacerbation (HCC)    Chest pain on breathing    Immunocompromised state ([address]    Acute exacerbation of chronic obstructive pulmonary disease (COPD) ([address]    Acute on chronic respiratory failure with hypoxia and hypercapnia (HCC)    Angina, class II ([facility])    Abnormal stress test    Left facial numbness    Memory deficits    Obstructive sleep apnea    Tobacco dependence    Anxiety and depression    History of CVA (cerebrovascular accident)     Past Surgical History:   Procedure Laterality Date    CHOLECYSTECTOMY, LAPAROSCOPIC  5/5/16    Corie    COLONOSCOPY  2014    ENDOSCOPY, COLON, DIAGNOSTIC      FOOT SURGERY  unsure    HERNIA REPAIR  unsure    HYSTERECTOMY  98    JOINT REPLACEMENT  unsure    Right knee    UPPER GASTROINTESTINAL ENDOSCOPY  2014     Prior to Admission medications    Medication Sig Start Date End Date Taking? Authorizing Provider   ibuprofen (ADVIL;MOTRIN) 600 MG tablet Take 1 tablet by mouth 3 times daily as needed for Pain 4/4/19  Yes Anibal Jon DO   oxybutynin (DITROPAN) 5 MG tablet Take 1 tablet by mouth 3 times daily 3/28/19  Yes JAMEY Lyle CNP   Blood Pressure Monitoring (BLOOD PRESSURE CUFF) MISC Automated blood pressure cuff. Dx: hypotension 3/20/19  Yes Anibal Jon DO   metoprolol tartrate (LOPRESSOR) 25 MG tablet TAKE 1/2 TABLET BY MOUTH TWICE A DAY 3/5/19  Yes Mary Jo Blackburn PA-C   Fluticasone Furoate-Vilanterol (BREO ELLIPTA) 200-25 MCG/INH AEPB Inhale 1 puff into the lungs daily Rinse mouth after its use.  1/3/19 1/3/20 Yes JAMEY Barboza CNP   fluticasone (FLONASE) 50 MCG/ACT nasal spray 2 sprays by Each Nare route daily 1/3/19  Yes JAMEY Barboza CNP   VENTOLIN  (90 Base) MCG/ACT inhaler inhale 2 puffs INTO THE LUNGS every 4 hours if needed for WHEEZING 12/11/18  Yes Anibal Jon DO   vitamin B-12 (CYANOCOBALAMIN) 500 MCG tablet Take 1 tablet by mouth daily 11/14/18 11/14/19 Yes Tyrone Villa MD   ipratropium-albuterol (DUONEB) 0.5-2.5 (3) MG/3ML SOLN nebulizer solution Inhale 3 mLs into the lungs 4 times daily 10/30/18  Yes JAMEY Mccall CNP   omeprazole (PRILOSEC) 40 MG delayed release capsule Take 1 capsule by mouth 2 times daily 9/26/18  Yes Anibal Jon DO   buPROPion (WELLBUTRIN) 100 MG tablet Take 100 mg by mouth 2 times daily   Yes Historical Provider, MD   lurasidone (LATUDA) 40 MG TABS tablet Take 40 mg by mouth daily   Yes Historical Provider, MD   TRAZODONE HCL PO Take by mouth   Yes Historical Provider, MD   Multiple Vitamin (MULTI VITAMIN DAILY PO) Take by mouth   Yes Historical Provider, MD   aclidinium (TUDORZA PRESSAIR) 400 MCG/ACT AEPB inhaler inhale 1 puff INTO THE LUNGS twice a day 8/9/18 Yes Park Areola, DO   mirtazapine (REMERON) 30 MG tablet Take 0.5-1 tablets by mouth nightly As needed 6/14/18  Yes Park Areola, DO   guaiFENesin [facility]) 600 MG extended release tablet TAKE ONE (1) TABLET BY MOUTH TWICE DAILY 6/14/18  Yes Park Areola, DO   cetirizine-psuedoephedrine (RA CETIRI-D) 5-120 MG per extended release tablet take 1 tablet by mouth twice a day 6/14/18  Yes Park Areola, DO   ATRIPLA 600-200-300 MG per tablet TAKE 1 TABLET BY MOUTH NIGHTLY 6/27/17  Yes Park Areola, DO   OXYGEN Inhale 2 L into the lungs nightly   Yes Historical Provider, MD         Review of Systems   Constitutional: Positive for chills. Negative for fever. HENT: Positive for congestion, postnasal drip and rhinorrhea. Eyes: Positive for redness. Negative for photophobia, pain, discharge and visual disturbance. Respiratory: Positive for cough. Cardiovascular: Negative. Gastrointestinal: Negative. Musculoskeletal: Negative. All other systems reviewed and are negative. Objective:   Physical Exam   Constitutional: She is oriented to person, place, and time. She appears well-developed and well-nourished. HENT:   Head: Normocephalic and atraumatic. Right Ear: A middle ear effusion is present. Left Ear: A middle ear effusion is present. Nose: Mucosal edema and rhinorrhea present. Mouth/Throat: Oropharynx is clear and moist and mucous membranes are normal.   Eyes: Pupils are equal, round, and reactive to light. EOM are normal. Right eye exhibits hordeolum. Right conjunctiva has a hemorrhage. Cardiovascular: Normal rate, regular rhythm and normal heart sounds. No murmur heard. Pulmonary/Chest: Effort normal and breath sounds normal.   Abdominal: Soft. Bowel sounds are normal.   Musculoskeletal: She exhibits no edema. Neurological: She is alert and oriented to person, place, and time. Skin: Skin is warm and dry.    Psychiatric: She has a normal mood

## 2019-04-24 ENCOUNTER — CARE COORDINATION (OUTPATIENT)
Dept: CARE COORDINATION | Age: 59
End: 2019-04-24

## 2019-04-24 RX ORDER — DOXYCYCLINE HYCLATE 100 MG
100 TABLET ORAL 2 TIMES DAILY
Qty: 14 TABLET | Refills: 0 | Status: SHIPPED | OUTPATIENT
Start: 2019-04-24 | End: 2019-05-01

## 2019-04-24 NOTE — CARE COORDINATION
Patient admits to productive cough, yellow sputum and wheezing. Patient was coughing throughout our conversation. Patient was seen last week in PCP office for nasal congestion and cough and the symptoms continue. Please advise.

## 2019-04-24 NOTE — CARE COORDINATION
Ambulatory Care Coordination Note  4/24/2019  CM Risk Score: 10  Liat Mortality Risk Score:      ACC: Sushma Barrientos    Summary Note: I spoke with the patient for continued Care Coordination follow up and education. Patient states she is starting to have a COPD exacerbation. Patient admits to productive cough, yellow sputum and wheezing. Patient was coughing throughout our conversation. Patient was seen last week in PCP office for nasal congestion and cough. Breathing is at baseline. Educated on how to identify sx's that are worse than the baseline and the importance of early symptom recognition and reporting to prevent exacerbation which may lead to ED visits and hospital admissions. We discussed Zone management tools for chronic disease(s) and patient denies current questions re: s/sx at this time. Patient eye was red and swollen and patient states it has cleared up. Patient was appreciative of call. Encouraged to use PCP office versus ED treatment for sx's of chronic conditions. Patient voiced understanding. I advised patient to contact PCP office if needed. No further needs at this time. Care Coordination Interventions    Program Enrollment:  Complex Care  Referral from Primary Care Provider:  No  Suggested Interventions and [address]:  Completed (Comment: Continued Care. nurse 1x wk)  Meals on Wheels:  Not Started (Comment: contact number for [employer] and Mom's Meals provided to pt)  Medi Set or Pill Pack:  Declined (Comment: pt does not use medi set. states that she needs to get one.  tried [facility], but did not like)  Other Services:   (Comment: has DEYANIRA Donahue Esters with [employer])  Transportation Support:  Completed  Zone Management Tools:  Completed (Comment: reviewed and mailed COPD zone mgmt tool sheet. 7/11 pt reports that she did not receive zone mgmt. in mail.  new COPD zone mgmt tool sheet mailed to pt. )  Other Services or Interventions:  CHW through Marichuy Briggs, [address] with [facility]         Goals Addressed     None          Prior to Admission medications    Medication Sig Start Date End Date Taking? Authorizing Provider   ibuprofen (ADVIL;MOTRIN) 600 MG tablet Take 1 tablet by mouth 3 times daily as needed for Pain 4/4/19   Anibal Jon, DO   oxybutynin (DITROPAN) 5 MG tablet Take 1 tablet by mouth 3 times daily 3/28/19   JAMEY Lyle CNP   Blood Pressure Monitoring (BLOOD PRESSURE CUFF) MISC Automated blood pressure cuff. Dx: hypotension 3/20/19   Frankceci Jon, DO   metoprolol tartrate (LOPRESSOR) 25 MG tablet TAKE 1/2 TABLET BY MOUTH TWICE A DAY 3/5/19   Mary Jo Blackburn PA-C   Fluticasone Furoate-Vilanterol (BREO ELLIPTA) 200-25 MCG/INH AEPB Inhale 1 puff into the lungs daily Rinse mouth after its use.  1/3/19 1/3/20  JAMEY Barboza CNP   fluticasone (FLONASE) 50 MCG/ACT nasal spray 2 sprays by Each Nare route daily 1/3/19   JAMEY Barboza CNP   VENTOLIN  (90 Base) MCG/ACT inhaler inhale 2 puffs INTO THE LUNGS every 4 hours if needed for WHEEZING 12/11/18   Anibal Jon DO   vitamin B-12 (CYANOCOBALAMIN) 500 MCG tablet Take 1 tablet by mouth daily 11/14/18 11/14/19  Tyrone Villa MD   ipratropium-albuterol (DUONEB) 0.5-2.5 (3) MG/3ML SOLN nebulizer solution Inhale 3 mLs into the lungs 4 times daily 10/30/18   JAMEY Mccall CNP   omeprazole (PRILOSEC) 40 MG delayed release capsule Take 1 capsule by mouth 2 times daily 9/26/18   Anibal Jon DO   buPROPion (WELLBUTRIN) 100 MG tablet Take 100 mg by mouth 2 times daily    Historical Provider, MD   lurasidone (LATUDA) 40 MG TABS tablet Take 40 mg by mouth daily    Historical Provider, MD   TRAZODONE HCL PO Take by mouth    Historical Provider, MD   Multiple Vitamin (MULTI VITAMIN DAILY PO) Take by mouth    Historical Provider, MD   aclidinium (TUDORZA PRESSAIR) 400 MCG/ACT AEPB inhaler inhale 1 puff INTO THE LUNGS twice a day 8/9/18   Gonzalo Izquierdo,    mirtazapine (REMERON) 30 MG tablet Take 0.5-1 tablets by mouth nightly As needed 6/14/18   Gonzalo Izquierdo,    guaiFENesin [facility]) 600 MG extended release tablet TAKE ONE (1) TABLET BY MOUTH TWICE DAILY 6/14/18   Gonzalo Izquierdo,    cetirizine-psuedoephedrine (RA CETIRI-D) 5-120 MG per extended release tablet take 1 tablet by mouth twice a day 6/14/18   Gonzalo Izquierdo, DO   ATRIPLA [id number] MG per tablet TAKE 1 TABLET BY MOUTH NIGHTLY 6/27/17   Gonzalo Izquierdo, DO   OXYGEN Inhale 2 L into the lungs nightly    Historical Provider, MD       Future Appointments   Date Time Provider Bertin Hoffman   6/26/2019  9:45 AM Juwan Ponce [facility]   4/13/2020  1:00 PM STR CT IMAGING RM1  [facility] EXP STR Radiolog   4/16/2020  1:00 PM JAMEY Marques - CNP Pulm Med [address] [facility]

## 2019-06-05 RX ORDER — ALBUTEROL SULFATE 90 UG/1
AEROSOL, METERED RESPIRATORY (INHALATION)
Qty: 18 G | Refills: 3 | Status: SHIPPED | OUTPATIENT
Start: 2019-06-05 | End: 2019-10-18 | Stop reason: SDUPTHER

## 2019-06-11 ENCOUNTER — CARE COORDINATION (OUTPATIENT)
Dept: CARE COORDINATION | Age: 59
End: 2019-06-11

## 2019-06-11 ASSESSMENT — ENCOUNTER SYMPTOMS: DYSPNEA ASSOCIATED WITH: EXERTION

## 2019-06-11 NOTE — CARE COORDINATION
Ambulatory Care Coordination Note  6/11/2019  CM Risk Score: 10  Liat Mortality Risk Score:      ACC: Óscar Hoffman RN    Summary Note: Melissa Pompa is being followed by Care Coordination for education and assistance in managing her COPD. Spoke with Melissa Pompa today for f/u. COPD: reports that breathing is at baseline. Denies increased cough or congestion. Quit smoking appx 2 mths ago. Congratulated pt on this. Was seen by PCP in April for right eye issue. Reports that sty has resolved. Had f/u with Dr. Carly Falk recently. Denies any changes to meds. Has f/u with urology on 6/26. Reminded of date and time of appt. Plan of Care:  Pt is doing well. No new barriers. Has been in care coordination for extended period of time. Has scooter that she gets around on. Quit smoking 2 mths ago. Last admission was in Feb for COPD exacerbation. no issues since. 1 ED visit in March for orthostatic dizziness. Now has bp cuff at home to monitor. Denies problems. Continues to f/u with specialists and PCP routinely and as needed. Goals met. Will graduate from care coordination upon PCP approval.         Care Coordination Interventions    Program Enrollment:  Complex Care  Referral from Primary Care Provider:  No  Suggested Interventions and [address]:  Completed (Comment: Continued Care. nurse 1x wk)  Meals on Wheels:  Declined (Comment: contact number for [employer] and [employer] Meals provided to pt)  Medi Set or Pill Pack:  Declined (Comment: pt does not use medi set. states that she needs to get one.  tried [facility], but did not like)  Other Services:   (Comment: has CM Yuridia Sportsman with [employer])  Transportation Support:  Completed  Zone Management Tools:  Completed (Comment: reviewed and mailed COPD zone mgmt tool sheet. 7/11 pt reports that she did not receive zone mgmt. in mail.  new COPD zone mgmt tool sheet mailed to pt. )  Other Services or Interventions:  CHW through [facility], [address] with [employer]         Goals Addressed                 This Visit's Progress     COMPLETED: Conditions and Symptoms        I will schedule office visits, as directed by my provider. I will keep my appointment or reschedule if I have to cancel. I will notify my provider of any barriers to my plan of care. I will follow my Zone Management tool to seek urgent or emergent care. I will notify my provider of any symptoms that indicate a worsening of my condition. Barriers: lack of support and overwhelmed by complexity of regimen  Plan for overcoming my barriers: support from Care Coordinator, [address] with [employer], and [employer]  Confidence: 6/10  Anticipated Goal Completion Date: 4/30/19         COMPLETED: Stop Cigarette/Tobacco use             Prior to Admission medications    Medication Sig Start Date End Date Taking? Authorizing Provider   albuterol sulfate HFA (VENTOLIN HFA) 108 (90 Base) MCG/ACT inhaler inhale 2 puffs INTO THE LUNGS every 4 hours if needed for WHEEZING 6/5/19   Little Resendiz, DO   ibuprofen (ADVIL;MOTRIN) 600 MG tablet Take 1 tablet by mouth 3 times daily as needed for Pain 4/4/19   Little Resendiz, DO   oxybutynin (DITROPAN) 5 MG tablet Take 1 tablet by mouth 3 times daily 3/28/19   JAMEY Mosqueda CNP   Blood Pressure Monitoring (BLOOD PRESSURE CUFF) MISC Automated blood pressure cuff. Dx: hypotension 3/20/19   Little Resendiz, DO   metoprolol tartrate (LOPRESSOR) 25 MG tablet TAKE 1/2 TABLET BY MOUTH TWICE A DAY 3/5/19   Francisco Tesfaye PA-C   Fluticasone Furoate-Vilanterol (BREO ELLIPTA) 200-25 MCG/INH AEPB Inhale 1 puff into the lungs daily Rinse mouth after its use.  1/3/19 1/3/20  JAMEY Jha CNP   fluticasone (FLONASE) 50 MCG/ACT nasal spray 2 sprays by Each Nare route daily 1/3/19   JAMEY Jha CNP   vitamin B-12 (CYANOCOBALAMIN) 500 MCG tablet Take 1 tablet by mouth daily 11/14/18 11/14/19  Jeremiah MD Donna   ipratropium-albuterol (DUONEB) 0.5-2.5 (3) MG/3ML SOLN nebulizer solution Inhale 3 mLs into the lungs 4 times daily 10/30/18   JAMEY Keys - CNP   omeprazole (PRILOSEC) 40 MG delayed release capsule Take 1 capsule by mouth 2 times daily 9/26/18   Deyanne Pleas, DO   buPROPion (WELLBUTRIN) 100 MG tablet Take 100 mg by mouth 2 times daily    Historical Provider, MD   lurasidone (LATUDA) 40 MG TABS tablet Take 40 mg by mouth daily    Historical Provider, MD   TRAZODONE HCL PO Take by mouth    Historical Provider, MD   Multiple Vitamin (MULTI VITAMIN DAILY PO) Take by mouth    Historical Provider, MD   aclidinium (TUDORZA PRESSAIR) 400 MCG/ACT AEPB inhaler inhale 1 puff INTO THE LUNGS twice a day 8/9/18   Deyanne Pleas, DO   mirtazapine (REMERON) 30 MG tablet Take 0.5-1 tablets by mouth nightly As needed 6/14/18   Deyanne Pleas, DO   guaiFENesin (MUCINEX) 600 MG extended release tablet TAKE ONE (1) TABLET BY MOUTH TWICE DAILY 6/14/18   Deyanne Pleas, DO   cetirizine-psuedoephedrine (RA CETIRI-D) 5-120 MG per extended release tablet take 1 tablet by mouth twice a day 6/14/18   Deyanne Pleas, DO   ATRIPLA [phone] MG per tablet TAKE 1 TABLET BY MOUTH NIGHTLY 6/27/17   Deyanne Pleas, DO   OXYGEN Inhale 2 L into the lungs nightly    Historical Provider, MD       Future Appointments   Date Time Provider Bertin Hoffman   6/26/2019  9:45 AM [facility]   4/13/2020  1:00 PM STR CT IMAGING RM1  [facility] Radiolog   4/16/2020  1:00 PM JAMEY Keys CNP Pulm Med [address] [facility]

## 2019-06-25 RX ORDER — CETIRIZINE HYDROCHLORIDE, PSEUDOEPHEDRINE HYDROCHLORIDE 5; 120 MG/1; MG/1
TABLET, FILM COATED, EXTENDED RELEASE ORAL
Qty: 60 TABLET | Refills: 11 | Status: SHIPPED | OUTPATIENT
Start: 2019-06-25 | End: 2021-10-11 | Stop reason: SDUPTHER

## 2019-06-27 ENCOUNTER — OFFICE VISIT (OUTPATIENT)
Dept: UROLOGY | Age: 59
End: 2019-06-27
Payer: COMMERCIAL

## 2019-06-27 ENCOUNTER — TELEPHONE (OUTPATIENT)
Dept: UROLOGY | Age: 59
End: 2019-06-27

## 2019-06-27 VITALS
WEIGHT: 168 LBS | BODY MASS INDEX: 27.99 KG/M2 | HEIGHT: 65 IN | DIASTOLIC BLOOD PRESSURE: 68 MMHG | SYSTOLIC BLOOD PRESSURE: 112 MMHG

## 2019-06-27 DIAGNOSIS — N81.6 RECTOCELE: ICD-10-CM

## 2019-06-27 DIAGNOSIS — R32 URINARY INCONTINENCE, UNSPECIFIED TYPE: Primary | ICD-10-CM

## 2019-06-27 LAB — POST VOID RESIDUAL (PVR): 76 ML

## 2019-06-27 PROCEDURE — 3017F COLORECTAL CA SCREEN DOC REV: CPT | Performed by: NURSE PRACTITIONER

## 2019-06-27 PROCEDURE — 51798 US URINE CAPACITY MEASURE: CPT | Performed by: NURSE PRACTITIONER

## 2019-06-27 PROCEDURE — 4004F PT TOBACCO SCREEN RCVD TLK: CPT | Performed by: NURSE PRACTITIONER

## 2019-06-27 PROCEDURE — G8598 ASA/ANTIPLAT THER USED: HCPCS | Performed by: NURSE PRACTITIONER

## 2019-06-27 PROCEDURE — G8427 DOCREV CUR MEDS BY ELIG CLIN: HCPCS | Performed by: NURSE PRACTITIONER

## 2019-06-27 PROCEDURE — G8417 CALC BMI ABV UP PARAM F/U: HCPCS | Performed by: NURSE PRACTITIONER

## 2019-06-27 PROCEDURE — 99213 OFFICE O/P EST LOW 20 MIN: CPT | Performed by: NURSE PRACTITIONER

## 2019-06-27 NOTE — TELEPHONE ENCOUNTER
Patient is scheduled to see Dr Fatoumata Puente on July 15th @ 2pm.  They requested last office note which I will fax.

## 2019-06-27 NOTE — PROGRESS NOTES
Medication Sig Dispense Refill    Mirabegron ER (MYRBETRIQ) 25 MG TB24 Take 1 tablet by mouth daily 28 tablet 0    Mirabegron ER (MYRBETRIQ) 25 MG TB24 Take 1 tablet by mouth daily 30 tablet 3    cetirizine-psuedoephedrine (ZYRTEC-D) 5-120 MG per extended release tablet take 1 tablet by mouth twice a day 60 tablet 11    ibuprofen (ADVIL;MOTRIN) 600 MG tablet Take 1 tablet by mouth 3 times daily as needed for Pain 90 tablet 5    Blood Pressure Monitoring (BLOOD PRESSURE CUFF) MISC Automated blood pressure cuff. Dx: hypotension 1 each 0    metoprolol tartrate (LOPRESSOR) 25 MG tablet TAKE 1/2 TABLET BY MOUTH TWICE A DAY 60 tablet 10    Fluticasone Furoate-Vilanterol (BREO ELLIPTA) 200-25 MCG/INH AEPB Inhale 1 puff into the lungs daily Rinse mouth after its use.  1 each 11    fluticasone (FLONASE) 50 MCG/ACT nasal spray 2 sprays by Each Nare route daily 3 Bottle 1    vitamin B-12 (CYANOCOBALAMIN) 500 MCG tablet Take 1 tablet by mouth daily 30 tablet 3    ipratropium-albuterol (DUONEB) 0.5-2.5 (3) MG/3ML SOLN nebulizer solution Inhale 3 mLs into the lungs 4 times daily 360 mL 11    omeprazole (PRILOSEC) 40 MG delayed release capsule Take 1 capsule by mouth 2 times daily 60 capsule 11    buPROPion (WELLBUTRIN) 100 MG tablet Take 100 mg by mouth 2 times daily      lurasidone (LATUDA) 40 MG TABS tablet Take 40 mg by mouth daily      TRAZODONE HCL PO Take by mouth      Multiple Vitamin (MULTI VITAMIN DAILY PO) Take by mouth      mirtazapine (REMERON) 30 MG tablet Take 0.5-1 tablets by mouth nightly As needed 30 tablet 11    guaiFENesin (MUCINEX) 600 MG extended release tablet TAKE ONE (1) TABLET BY MOUTH TWICE DAILY 60 tablet 11    ATRIPLA 600-200-300 MG per tablet TAKE 1 TABLET BY MOUTH NIGHTLY 30 tablet 2    OXYGEN Inhale 2 L into the lungs nightly      albuterol sulfate HFA (VENTOLIN HFA) 108 (90 Base) MCG/ACT inhaler inhale 2 puffs INTO THE LUNGS every 4 hours if needed for WHEEZING 18 g 3    aclidinium (TUDORZA PRESSAIR) 400 MCG/ACT AEPB inhaler inhale 1 puff INTO THE LUNGS twice a day 1 each 11     No current facility-administered medications for this visit. Facility-Administered Medications Ordered in Other Visits   Medication Dose Route Frequency Provider Last Rate Last Dose    0.9 % sodium chloride infusion   Intravenous Continuous Nicki Grullon PA-C           Past Medical History  Avi Reece  has a past medical history of Acid reflux, Anxiety, Arthritis, Asthma, Bipolar disorder ([address] CAD (coronary artery disease), Cancer ([address] Chronic back pain, Chronic kidney disease, COPD (chronic obstructive pulmonary disease) ([address] Depression, Emphysema of lung ([address] Headache(784.0), Hearing deficit, Heart disease, HIV disease ([address] Hyperlipidemia, Immune deficiency disorder ([address] Pleurisy, PTSD (post-traumatic stress disorder), Sleep apnea, Tobacco abuse, and UTI (lower urinary tract infection). Past Surgical History  The patient  has a past surgical history that includes Foot surgery (unsure); Hysterectomy (98); hernia repair (unsure); joint replacement (unsure); Colonoscopy (2014); Upper gastrointestinal endoscopy (2014); Cholecystectomy, laparoscopic (5/5/16); and Endoscopy, colon, diagnostic. Family History  This patient's family history includes Asthma in her father and mother; Maryl Lose in her father; Heart Disease in her mother; Uterine Cancer in her mother. Social History  Avi Reece  reports that she has been smoking cigarettes. She started smoking about 45 years ago. She has smoked for the past 39.00 years. She has never used smokeless tobacco. She reports that she does not drink alcohol or use drugs. Subjective:     Review of Systems  No problems with ears, nose or throat. No problems with eyes. No chest pain, shortness of breath, abdominal pain, extremity pain or weakness, and no neurological deficits. No rashes.  symptoms per HPI.   The remainder of the review of symptoms is negative. Objective:     PE:   Vitals:    06/27/19 1331   BP: 112/68   Weight: 168 lb (76.2 kg)   Height: 5' 5\" (1.651 m)       Constitutional: Alert and oriented times 3, no acute distress and cooperative to examination with appropriate mood and affect. HENT:   Head:        Normocephalic and atraumatic. Mouth/Throat:         Mucous membranes are normal.   Eyes:         EOM are normal. No scleral icterus. PERRLA. Neck:        Supple, symmetrical, trachea midline  Cardiovascular:        Normal rate, regular rhythm, S1 S2 heart sounds. No murmurs, rub, or gallops. Pulmonary/Chest:      Chest symmetric with normal A/P diameter,  CTA with no wheezes, rales, or rhonchi noted. Normal respiratory rate and rhthym. No use of accessory muscles. Abdominal:         Soft. No tenderness. Bowel sounds present. : grade 1-2 rectocele. No leaking of urine when cough is elicited. Extremities: No cyanosis, clubbing, or edema present. Neurological:        Alert and oriented. No cranial nerve deficit. Salvatore Terrell Psychiatric:        Normal mood and affect. Labs   Urine dip demonstrates   Results for POC orders placed in visit on 06/27/19   poct post void residual   Result Value Ref Range    post void residual 76 ml        Recent BUN/Creatinine:  Lab Results   Component Value Date    BUN 15 03/19/2019    CREATININE 0.8 03/19/2019         Assessment & Plan:       Mixed incontinence  Urinary frequency and urgency  Rectocele     Stop Oxybutynin, patient reports it did not help symptoms. Start Myrbetriq 25 mg daily. Patient may need advance therapies for OAB. Will schedule follow up with Dr. Melo Casarez, patient took Pessary out 2 weeks after being placed due to pain and bleeding. Patient still reports feeling a bulge and difficulty evacuating stool at times. Follow up in 4 months for incontinence, urgency and frequency.      JAMEY Red  Urology

## 2019-06-30 ENCOUNTER — APPOINTMENT (OUTPATIENT)
Dept: GENERAL RADIOLOGY | Age: 59
End: 2019-06-30
Payer: COMMERCIAL

## 2019-06-30 ENCOUNTER — APPOINTMENT (OUTPATIENT)
Dept: CT IMAGING | Age: 59
End: 2019-06-30
Payer: COMMERCIAL

## 2019-06-30 ENCOUNTER — HOSPITAL ENCOUNTER (OUTPATIENT)
Age: 59
Setting detail: OBSERVATION
Discharge: HOME | End: 2019-07-02
Attending: INTERNAL MEDICINE | Admitting: INTERNAL MEDICINE
Payer: COMMERCIAL

## 2019-06-30 DIAGNOSIS — S92.301A MULTIPLE CLOSED FRACTURES OF METATARSAL BONE OF RIGHT FOOT, INITIAL ENCOUNTER: ICD-10-CM

## 2019-06-30 DIAGNOSIS — I95.1 ORTHOSTATIC HYPOTENSION: ICD-10-CM

## 2019-06-30 DIAGNOSIS — R29.898 LEFT ARM WEAKNESS: ICD-10-CM

## 2019-06-30 DIAGNOSIS — S93.401A SPRAIN OF RIGHT ANKLE, UNSPECIFIED LIGAMENT, INITIAL ENCOUNTER: ICD-10-CM

## 2019-06-30 DIAGNOSIS — R55 SYNCOPE AND COLLAPSE: Primary | ICD-10-CM

## 2019-06-30 DIAGNOSIS — I95.9 HYPOTENSION, UNSPECIFIED HYPOTENSION TYPE: ICD-10-CM

## 2019-06-30 PROBLEM — M25.472 LEFT ANKLE SWELLING: Status: ACTIVE | Noted: 2019-06-30

## 2019-06-30 PROBLEM — S92.901A CLOSED FRACTURE OF BONE OF RIGHT FOOT: Status: ACTIVE | Noted: 2019-06-30

## 2019-06-30 LAB
ALBUMIN SERPL-MCNC: 4 G/DL (ref 3.5–5.1)
ALP BLD-CCNC: 175 U/L (ref 38–126)
ALT SERPL-CCNC: 17 U/L (ref 11–66)
ANION GAP SERPL CALCULATED.3IONS-SCNC: 11 MEQ/L (ref 8–16)
AST SERPL-CCNC: 19 U/L (ref 5–40)
BASOPHILS # BLD: 0.9 %
BASOPHILS ABSOLUTE: 0.1 THOU/MM3 (ref 0–0.1)
BILIRUB SERPL-MCNC: 0.2 MG/DL (ref 0.3–1.2)
BUN BLDV-MCNC: 9 MG/DL (ref 7–22)
CALCIUM SERPL-MCNC: 9.3 MG/DL (ref 8.5–10.5)
CHLORIDE BLD-SCNC: 104 MEQ/L (ref 98–111)
CO2: 24 MEQ/L (ref 23–33)
CREAT SERPL-MCNC: 1.1 MG/DL (ref 0.4–1.2)
EKG ATRIAL RATE: 71 BPM
EKG P AXIS: 72 DEGREES
EKG P-R INTERVAL: 150 MS
EKG Q-T INTERVAL: 436 MS
EKG QRS DURATION: 104 MS
EKG QTC CALCULATION (BAZETT): 473 MS
EKG R AXIS: 62 DEGREES
EKG T AXIS: 64 DEGREES
EKG VENTRICULAR RATE: 71 BPM
EOSINOPHIL # BLD: 1.4 %
EOSINOPHILS ABSOLUTE: 0.1 THOU/MM3 (ref 0–0.4)
ERYTHROCYTE [DISTWIDTH] IN BLOOD BY AUTOMATED COUNT: 13.4 % (ref 11.5–14.5)
ERYTHROCYTE [DISTWIDTH] IN BLOOD BY AUTOMATED COUNT: 45.6 FL (ref 35–45)
GFR SERPL CREATININE-BSD FRML MDRD: 51 ML/MIN/1.73M2
GLUCOSE BLD-MCNC: 138 MG/DL (ref 70–108)
HCT VFR BLD CALC: 38.4 % (ref 37–47)
HEMOGLOBIN: 12.9 GM/DL (ref 12–16)
IMMATURE GRANS (ABS): 0.02 THOU/MM3 (ref 0–0.07)
IMMATURE GRANULOCYTES: 0.3 %
LYMPHOCYTES # BLD: 22.4 %
LYMPHOCYTES ABSOLUTE: 1.6 THOU/MM3 (ref 1–4.8)
MCH RBC QN AUTO: 31.5 PG (ref 26–33)
MCHC RBC AUTO-ENTMCNC: 33.6 GM/DL (ref 32.2–35.5)
MCV RBC AUTO: 93.7 FL (ref 81–99)
MONOCYTES # BLD: 8.9 %
MONOCYTES ABSOLUTE: 0.6 THOU/MM3 (ref 0.4–1.3)
NUCLEATED RED BLOOD CELLS: 0 /100 WBC
OSMOLALITY CALCULATION: 278.4 MOSMOL/KG (ref 275–300)
PLATELET # BLD: 252 THOU/MM3 (ref 130–400)
PMV BLD AUTO: 9.5 FL (ref 9.4–12.4)
POTASSIUM SERPL-SCNC: 3.5 MEQ/L (ref 3.5–5.2)
RBC # BLD: 4.1 MILL/MM3 (ref 4.2–5.4)
SEG NEUTROPHILS: 66.1 %
SEGMENTED NEUTROPHILS ABSOLUTE COUNT: 4.6 THOU/MM3 (ref 1.8–7.7)
SODIUM BLD-SCNC: 139 MEQ/L (ref 135–145)
TOTAL PROTEIN: 6.9 G/DL (ref 6.1–8)
TROPONIN T: < 0.01 NG/ML
WBC # BLD: 7 THOU/MM3 (ref 4.8–10.8)

## 2019-06-30 PROCEDURE — 73610 X-RAY EXAM OF ANKLE: CPT

## 2019-06-30 PROCEDURE — 70450 CT HEAD/BRAIN W/O DYE: CPT

## 2019-06-30 PROCEDURE — 29515 APPLICATION SHORT LEG SPLINT: CPT

## 2019-06-30 PROCEDURE — 99220 PR INITIAL OBSERVATION CARE/DAY 70 MINUTES: CPT | Performed by: PHYSICIAN ASSISTANT

## 2019-06-30 PROCEDURE — 94640 AIRWAY INHALATION TREATMENT: CPT

## 2019-06-30 PROCEDURE — 6370000000 HC RX 637 (ALT 250 FOR IP): Performed by: PHYSICIAN ASSISTANT

## 2019-06-30 PROCEDURE — 73630 X-RAY EXAM OF FOOT: CPT

## 2019-06-30 PROCEDURE — 84484 ASSAY OF TROPONIN QUANT: CPT

## 2019-06-30 PROCEDURE — 72125 CT NECK SPINE W/O DYE: CPT

## 2019-06-30 PROCEDURE — 80053 COMPREHEN METABOLIC PANEL: CPT

## 2019-06-30 PROCEDURE — 96360 HYDRATION IV INFUSION INIT: CPT

## 2019-06-30 PROCEDURE — 36415 COLL VENOUS BLD VENIPUNCTURE: CPT

## 2019-06-30 PROCEDURE — 2709999900 HC NON-CHARGEABLE SUPPLY

## 2019-06-30 PROCEDURE — 85025 COMPLETE CBC W/AUTO DIFF WBC: CPT

## 2019-06-30 PROCEDURE — G0378 HOSPITAL OBSERVATION PER HR: HCPCS

## 2019-06-30 PROCEDURE — 2580000003 HC RX 258: Performed by: PHYSICIAN ASSISTANT

## 2019-06-30 PROCEDURE — 96361 HYDRATE IV INFUSION ADD-ON: CPT

## 2019-06-30 PROCEDURE — 93005 ELECTROCARDIOGRAM TRACING: CPT | Performed by: PHYSICIAN ASSISTANT

## 2019-06-30 PROCEDURE — 99285 EMERGENCY DEPT VISIT HI MDM: CPT

## 2019-06-30 RX ORDER — ONDANSETRON 2 MG/ML
4 INJECTION INTRAMUSCULAR; INTRAVENOUS EVERY 6 HOURS PRN
Status: DISCONTINUED | OUTPATIENT
Start: 2019-06-30 | End: 2019-07-02 | Stop reason: HOSPADM

## 2019-06-30 RX ORDER — HYDROCODONE BITARTRATE AND ACETAMINOPHEN 5; 325 MG/1; MG/1
1 TABLET ORAL ONCE
Status: COMPLETED | OUTPATIENT
Start: 2019-06-30 | End: 2019-06-30

## 2019-06-30 RX ORDER — SODIUM CHLORIDE 0.9 % (FLUSH) 0.9 %
10 SYRINGE (ML) INJECTION EVERY 12 HOURS SCHEDULED
Status: DISCONTINUED | OUTPATIENT
Start: 2019-06-30 | End: 2019-07-02 | Stop reason: HOSPADM

## 2019-06-30 RX ORDER — TRAZODONE HYDROCHLORIDE 50 MG/1
150 TABLET ORAL NIGHTLY PRN
Status: DISCONTINUED | OUTPATIENT
Start: 2019-06-30 | End: 2019-07-02 | Stop reason: HOSPADM

## 2019-06-30 RX ORDER — ACETAMINOPHEN 325 MG/1
650 TABLET ORAL EVERY 4 HOURS PRN
Status: DISCONTINUED | OUTPATIENT
Start: 2019-06-30 | End: 2019-07-02 | Stop reason: HOSPADM

## 2019-06-30 RX ORDER — TRAMADOL HYDROCHLORIDE 50 MG/1
50 TABLET ORAL EVERY 6 HOURS PRN
Status: DISCONTINUED | OUTPATIENT
Start: 2019-06-30 | End: 2019-07-02 | Stop reason: HOSPADM

## 2019-06-30 RX ORDER — NICOTINE 21 MG/24HR
1 PATCH, TRANSDERMAL 24 HOURS TRANSDERMAL DAILY
Status: DISCONTINUED | OUTPATIENT
Start: 2019-06-30 | End: 2019-07-02 | Stop reason: HOSPADM

## 2019-06-30 RX ORDER — SODIUM CHLORIDE 9 MG/ML
INJECTION, SOLUTION INTRAVENOUS CONTINUOUS
Status: DISCONTINUED | OUTPATIENT
Start: 2019-06-30 | End: 2019-07-02 | Stop reason: HOSPADM

## 2019-06-30 RX ORDER — IPRATROPIUM BROMIDE AND ALBUTEROL SULFATE 2.5; .5 MG/3ML; MG/3ML
1 SOLUTION RESPIRATORY (INHALATION) 4 TIMES DAILY
Status: DISCONTINUED | OUTPATIENT
Start: 2019-06-30 | End: 2019-07-02 | Stop reason: HOSPADM

## 2019-06-30 RX ORDER — HYDROCODONE BITARTRATE AND ACETAMINOPHEN 5; 325 MG/1; MG/1
1 TABLET ORAL EVERY 6 HOURS PRN
Status: DISCONTINUED | OUTPATIENT
Start: 2019-06-30 | End: 2019-07-02 | Stop reason: HOSPADM

## 2019-06-30 RX ORDER — BUPROPION HYDROCHLORIDE 150 MG/1
150 TABLET ORAL DAILY
Status: DISCONTINUED | OUTPATIENT
Start: 2019-07-01 | End: 2019-07-02 | Stop reason: HOSPADM

## 2019-06-30 RX ORDER — POTASSIUM CHLORIDE 7.45 MG/ML
10 INJECTION INTRAVENOUS PRN
Status: DISCONTINUED | OUTPATIENT
Start: 2019-06-30 | End: 2019-07-02 | Stop reason: HOSPADM

## 2019-06-30 RX ORDER — EFAVIRENZ, EMTRICITABINE AND TENOFOVIR DISOPROXIL FUMARATE 600; 200; 300 MG/1; MG/1; MG/1
1 TABLET, FILM COATED ORAL NIGHTLY
Status: DISCONTINUED | OUTPATIENT
Start: 2019-06-30 | End: 2019-07-02 | Stop reason: HOSPADM

## 2019-06-30 RX ORDER — SODIUM CHLORIDE 0.9 % (FLUSH) 0.9 %
10 SYRINGE (ML) INJECTION PRN
Status: DISCONTINUED | OUTPATIENT
Start: 2019-06-30 | End: 2019-07-02 | Stop reason: HOSPADM

## 2019-06-30 RX ORDER — ALBUTEROL SULFATE 90 UG/1
1 AEROSOL, METERED RESPIRATORY (INHALATION) EVERY 4 HOURS PRN
Status: DISCONTINUED | OUTPATIENT
Start: 2019-06-30 | End: 2019-07-02 | Stop reason: HOSPADM

## 2019-06-30 RX ORDER — 0.9 % SODIUM CHLORIDE 0.9 %
1000 INTRAVENOUS SOLUTION INTRAVENOUS ONCE
Status: COMPLETED | OUTPATIENT
Start: 2019-06-30 | End: 2019-06-30

## 2019-06-30 RX ORDER — BUPROPION HYDROCHLORIDE 150 MG/1
150 TABLET ORAL EVERY MORNING
COMMUNITY

## 2019-06-30 RX ORDER — TRAZODONE HYDROCHLORIDE 100 MG/1
150 TABLET ORAL NIGHTLY PRN
COMMUNITY
End: 2020-06-24

## 2019-06-30 RX ORDER — GUAIFENESIN 600 MG/1
600 TABLET, EXTENDED RELEASE ORAL 2 TIMES DAILY
Status: DISCONTINUED | OUTPATIENT
Start: 2019-06-30 | End: 2019-07-02 | Stop reason: HOSPADM

## 2019-06-30 RX ADMIN — SODIUM CHLORIDE: 9 INJECTION, SOLUTION INTRAVENOUS at 20:55

## 2019-06-30 RX ADMIN — HYDROCODONE BITARTRATE AND ACETAMINOPHEN 1 TABLET: 5; 325 TABLET ORAL at 20:43

## 2019-06-30 RX ADMIN — EFAVIRENZ, EMTRICITABINE, AND TENOFOVIR DISOPROXIL FUMARATE 1 TABLET: 600; 200; 300 TABLET, FILM COATED ORAL at 22:53

## 2019-06-30 RX ADMIN — HYDROCODONE BITARTRATE AND ACETAMINOPHEN 1 TABLET: 5; 325 TABLET ORAL at 19:24

## 2019-06-30 RX ADMIN — IPRATROPIUM BROMIDE AND ALBUTEROL SULFATE 3 ML: .5; 3 SOLUTION RESPIRATORY (INHALATION) at 20:50

## 2019-06-30 RX ADMIN — SODIUM CHLORIDE 1000 ML: 9 INJECTION, SOLUTION INTRAVENOUS at 17:16

## 2019-06-30 RX ADMIN — GUAIFENESIN 600 MG: 600 TABLET, EXTENDED RELEASE ORAL at 22:51

## 2019-06-30 ASSESSMENT — PAIN - FUNCTIONAL ASSESSMENT
PAIN_FUNCTIONAL_ASSESSMENT: PREVENTS OR INTERFERES WITH MANY ACTIVE NOT PASSIVE ACTIVITIES

## 2019-06-30 ASSESSMENT — PAIN DESCRIPTION - ONSET
ONSET: ON-GOING
ONSET: SUDDEN
ONSET: ON-GOING

## 2019-06-30 ASSESSMENT — PAIN DESCRIPTION - DESCRIPTORS
DESCRIPTORS: THROBBING
DESCRIPTORS: THROBBING
DESCRIPTORS: STABBING;SHARP;SHOOTING

## 2019-06-30 ASSESSMENT — PAIN SCALES - GENERAL
PAINLEVEL_OUTOF10: 10

## 2019-06-30 ASSESSMENT — ENCOUNTER SYMPTOMS
SORE THROAT: 0
RHINORRHEA: 0
ABDOMINAL PAIN: 0
EYE PAIN: 0
COUGH: 0
EYE DISCHARGE: 0
VOMITING: 0
NAUSEA: 0
BACK PAIN: 0
WHEEZING: 0
SHORTNESS OF BREATH: 0
DIARRHEA: 0

## 2019-06-30 ASSESSMENT — PAIN DESCRIPTION - PROGRESSION
CLINICAL_PROGRESSION: NOT CHANGED
CLINICAL_PROGRESSION: GRADUALLY WORSENING
CLINICAL_PROGRESSION: NOT CHANGED

## 2019-06-30 ASSESSMENT — PAIN DESCRIPTION - LOCATION
LOCATION: FOOT

## 2019-06-30 ASSESSMENT — PAIN DESCRIPTION - FREQUENCY
FREQUENCY: CONTINUOUS

## 2019-06-30 ASSESSMENT — PAIN DESCRIPTION - ORIENTATION
ORIENTATION: RIGHT;LEFT

## 2019-06-30 ASSESSMENT — PAIN DESCRIPTION - PAIN TYPE
TYPE: ACUTE PAIN

## 2019-06-30 NOTE — ED PROVIDER NOTES
right ankle, unspecified ligament, initial encounter    4. Hypotension, unspecified hypotension type          (Please note that portions of this note were completed with a voice recognition program.  Efforts were made to edit the dictations but occasionally words are mis-transcribed.)    Scribe:  Ama Guerrero 6/30/19 4:55 PM Scribing for and in the presence of Tito Orellana PA-C. Signed by: Fidelina Jones 06/30/19 6:25 PM    Provider:  I personally performed the services described in the documentation, reviewed and edited the documentation which was dictated to the scribe in my presence, and it accurately records my words and actions.     Tito Orellana PA-C 06/30/19 6:25 PM    Caren Castelan PA-C  06/30/19 1943

## 2019-06-30 NOTE — ED NOTES
Patient updated on plan of care, room assigned. Call light in reach. resp reg.      Bruno Rome RN  06/30/19 7511

## 2019-06-30 NOTE — H&P
negative. PHYSICAL EXAM:    BP (!) 92/59   Pulse 74   Temp 98 °F (36.7 °C) (Oral)   Resp 18   Ht 5' 5\" (1.651 m)   Wt 167 lb (75.8 kg)   LMP  (Exact Date)   SpO2 98%   BMI 27.79 kg/m²     General appearance:  No apparent distress, appears stated age and cooperative. HEENT:  Normal cephalic, atraumatic without obvious deformity. Pupils equal, round, and reactive to light. Extra ocular muscles intact. Conjunctivae/corneas clear. Neck: Supple, with full range of motion. No jugular venous distention. Trachea midline. Respiratory:  Normal respiratory effort. Clear to auscultation, bilaterally with Wheezes. No Rhonchi/Rales  Cardiovascular:  Regular rate and rhythm with normal S1/S2 without murmurs, rubs or gallops. Abdomen: Soft, non-tender, non-distended with normal bowel sounds. Musculoskeletal:  No clubbing, cyanosis or edema bilaterally. Full range of motion without deformity. Minimal left ankle swelling, ecchymosis. Reduced ROM of left ankle. Ecchymosis of distal dorsum of right foot with minor swelling. Skin: Skin color, texture, turgor normal.  No rashes or lesions. Neurologic:  Neurovascularly intact without any focal sensory/motor deficits. Cranial nerves: II-XII intact, grossly non-focal.  Psychiatric:  Alert and oriented, thought content appropriate, normal insight  Capillary Refill: Brisk,< 3 seconds   Peripheral Pulses: +2 palpable, equal bilaterally       Labs:     Recent Labs     06/30/19  1704   WBC 7.0   HGB 12.9   HCT 38.4        Recent Labs     06/30/19  1704      K 3.5      CO2 24   BUN 9   CREATININE 1.1   CALCIUM 9.3     Recent Labs     06/30/19  1704   AST 19   ALT 17   BILITOT 0.2*   ALKPHOS 175*     No results for input(s): INR in the last 72 hours. No results for input(s): Susan Nela in the last 72 hours.     Urinalysis:      Lab Results   Component Value Date    NITRU NEGATIVE 11/14/2018    WBCUA 25-50 08/14/2015    WBCUA 10-15 08/11/2011

## 2019-06-30 NOTE — ED NOTES
Lenny Courser RON in room to update pt on results and POC. Pt given turkey sandwich and marcell mist. Denies other needs. Resp regular. Call light in reach.       Mahesh Noriega RN  06/30/19 7560

## 2019-06-30 NOTE — ED NOTES
Patient transported to  [facility] by cart in stable condition. IV infusing and line is patent.         Mateo Desai, EMT-P  06/30/19 1943

## 2019-07-01 ENCOUNTER — TELEPHONE (OUTPATIENT)
Dept: FAMILY MEDICINE CLINIC | Age: 59
End: 2019-07-01

## 2019-07-01 LAB
ANION GAP SERPL CALCULATED.3IONS-SCNC: 11 MEQ/L (ref 8–16)
BUN BLDV-MCNC: 8 MG/DL (ref 7–22)
CALCIUM SERPL-MCNC: 8.6 MG/DL (ref 8.5–10.5)
CHLORIDE BLD-SCNC: 109 MEQ/L (ref 98–111)
CO2: 23 MEQ/L (ref 23–33)
CREAT SERPL-MCNC: 0.9 MG/DL (ref 0.4–1.2)
ERYTHROCYTE [DISTWIDTH] IN BLOOD BY AUTOMATED COUNT: 13.5 % (ref 11.5–14.5)
ERYTHROCYTE [DISTWIDTH] IN BLOOD BY AUTOMATED COUNT: 48 FL (ref 35–45)
GFR SERPL CREATININE-BSD FRML MDRD: 64 ML/MIN/1.73M2
GLUCOSE BLD-MCNC: 99 MG/DL (ref 70–108)
HCT VFR BLD CALC: 36.3 % (ref 37–47)
HEMOGLOBIN: 11.7 GM/DL (ref 12–16)
MCH RBC QN AUTO: 31.5 PG (ref 26–33)
MCHC RBC AUTO-ENTMCNC: 32.2 GM/DL (ref 32.2–35.5)
MCV RBC AUTO: 97.8 FL (ref 81–99)
PLATELET # BLD: 189 THOU/MM3 (ref 130–400)
PMV BLD AUTO: 9.4 FL (ref 9.4–12.4)
POTASSIUM SERPL-SCNC: 3.7 MEQ/L (ref 3.5–5.2)
RBC # BLD: 3.71 MILL/MM3 (ref 4.2–5.4)
REASON FOR REJECTION: NORMAL
REJECTED TEST: NORMAL
SODIUM BLD-SCNC: 143 MEQ/L (ref 135–145)
WBC # BLD: 5.4 THOU/MM3 (ref 4.8–10.8)

## 2019-07-01 PROCEDURE — G0378 HOSPITAL OBSERVATION PER HR: HCPCS

## 2019-07-01 PROCEDURE — 97166 OT EVAL MOD COMPLEX 45 MIN: CPT

## 2019-07-01 PROCEDURE — 6360000002 HC RX W HCPCS: Performed by: PHYSICIAN ASSISTANT

## 2019-07-01 PROCEDURE — 6370000000 HC RX 637 (ALT 250 FOR IP): Performed by: PHYSICIAN ASSISTANT

## 2019-07-01 PROCEDURE — 85027 COMPLETE CBC AUTOMATED: CPT

## 2019-07-01 PROCEDURE — 80048 BASIC METABOLIC PNL TOTAL CA: CPT

## 2019-07-01 PROCEDURE — 99226 PR SBSQ OBSERVATION CARE/DAY 35 MINUTES: CPT | Performed by: INTERNAL MEDICINE

## 2019-07-01 PROCEDURE — 93010 ELECTROCARDIOGRAM REPORT: CPT | Performed by: INTERNAL MEDICINE

## 2019-07-01 PROCEDURE — 97535 SELF CARE MNGMENT TRAINING: CPT

## 2019-07-01 PROCEDURE — 36415 COLL VENOUS BLD VENIPUNCTURE: CPT

## 2019-07-01 PROCEDURE — 2580000003 HC RX 258: Performed by: PHYSICIAN ASSISTANT

## 2019-07-01 PROCEDURE — 96372 THER/PROPH/DIAG INJ SC/IM: CPT

## 2019-07-01 PROCEDURE — 94640 AIRWAY INHALATION TREATMENT: CPT

## 2019-07-01 RX ADMIN — METOPROLOL TARTRATE 12.5 MG: 25 TABLET ORAL at 09:23

## 2019-07-01 RX ADMIN — SODIUM CHLORIDE: 9 INJECTION, SOLUTION INTRAVENOUS at 18:30

## 2019-07-01 RX ADMIN — IPRATROPIUM BROMIDE AND ALBUTEROL SULFATE 3 ML: .5; 3 SOLUTION RESPIRATORY (INHALATION) at 18:21

## 2019-07-01 RX ADMIN — IPRATROPIUM BROMIDE AND ALBUTEROL SULFATE 3 ML: .5; 3 SOLUTION RESPIRATORY (INHALATION) at 12:18

## 2019-07-01 RX ADMIN — HYDROCODONE BITARTRATE AND ACETAMINOPHEN 1 TABLET: 5; 325 TABLET ORAL at 23:22

## 2019-07-01 RX ADMIN — LURASIDONE HYDROCHLORIDE 40 MG: 40 TABLET, FILM COATED ORAL at 09:23

## 2019-07-01 RX ADMIN — Medication 1 PUFF: at 20:54

## 2019-07-01 RX ADMIN — SODIUM CHLORIDE: 9 INJECTION, SOLUTION INTRAVENOUS at 09:25

## 2019-07-01 RX ADMIN — HYDROCODONE BITARTRATE AND ACETAMINOPHEN 1 TABLET: 5; 325 TABLET ORAL at 17:24

## 2019-07-01 RX ADMIN — GUAIFENESIN 600 MG: 600 TABLET, EXTENDED RELEASE ORAL at 09:23

## 2019-07-01 RX ADMIN — GUAIFENESIN 600 MG: 600 TABLET, EXTENDED RELEASE ORAL at 22:14

## 2019-07-01 RX ADMIN — BUPROPION HYDROCHLORIDE 150 MG: 150 TABLET, EXTENDED RELEASE ORAL at 09:23

## 2019-07-01 RX ADMIN — ENOXAPARIN SODIUM 40 MG: 40 INJECTION SUBCUTANEOUS at 09:23

## 2019-07-01 RX ADMIN — IPRATROPIUM BROMIDE AND ALBUTEROL SULFATE 3 ML: .5; 3 SOLUTION RESPIRATORY (INHALATION) at 08:08

## 2019-07-01 RX ADMIN — METOPROLOL TARTRATE 12.5 MG: 25 TABLET ORAL at 22:14

## 2019-07-01 RX ADMIN — HYDROCODONE BITARTRATE AND ACETAMINOPHEN 1 TABLET: 5; 325 TABLET ORAL at 03:49

## 2019-07-01 RX ADMIN — IPRATROPIUM BROMIDE AND ALBUTEROL SULFATE 3 ML: .5; 3 SOLUTION RESPIRATORY (INHALATION) at 15:00

## 2019-07-01 RX ADMIN — HYDROCODONE BITARTRATE AND ACETAMINOPHEN 1 TABLET: 5; 325 TABLET ORAL at 10:19

## 2019-07-01 RX ADMIN — EFAVIRENZ, EMTRICITABINE, AND TENOFOVIR DISOPROXIL FUMARATE 1 TABLET: 600; 200; 300 TABLET, FILM COATED ORAL at 22:14

## 2019-07-01 ASSESSMENT — PAIN - FUNCTIONAL ASSESSMENT
PAIN_FUNCTIONAL_ASSESSMENT: PREVENTS OR INTERFERES WITH MANY ACTIVE NOT PASSIVE ACTIVITIES
PAIN_FUNCTIONAL_ASSESSMENT: PREVENTS OR INTERFERES SOME ACTIVE ACTIVITIES AND ADLS

## 2019-07-01 ASSESSMENT — PAIN SCALES - GENERAL
PAINLEVEL_OUTOF10: 10
PAINLEVEL_OUTOF10: 10
PAINLEVEL_OUTOF10: 9
PAINLEVEL_OUTOF10: 10
PAINLEVEL_OUTOF10: 7
PAINLEVEL_OUTOF10: 10
PAINLEVEL_OUTOF10: 10
PAINLEVEL_OUTOF10: 9
PAINLEVEL_OUTOF10: 10
PAINLEVEL_OUTOF10: 7
PAINLEVEL_OUTOF10: 0
PAINLEVEL_OUTOF10: 10

## 2019-07-01 ASSESSMENT — PAIN DESCRIPTION - PROGRESSION
CLINICAL_PROGRESSION: NOT CHANGED
CLINICAL_PROGRESSION: NOT CHANGED

## 2019-07-01 ASSESSMENT — PAIN DESCRIPTION - PAIN TYPE
TYPE: ACUTE PAIN

## 2019-07-01 ASSESSMENT — PAIN DESCRIPTION - LOCATION
LOCATION: FOOT

## 2019-07-01 ASSESSMENT — PAIN DESCRIPTION - FREQUENCY
FREQUENCY: CONTINUOUS
FREQUENCY: CONTINUOUS

## 2019-07-01 ASSESSMENT — PAIN DESCRIPTION - DESCRIPTORS
DESCRIPTORS: PINS AND NEEDLES;THROBBING
DESCRIPTORS: THROBBING

## 2019-07-01 ASSESSMENT — PAIN DESCRIPTION - ORIENTATION
ORIENTATION: RIGHT;LEFT

## 2019-07-01 ASSESSMENT — PAIN DESCRIPTION - ONSET
ONSET: ON-GOING
ONSET: ON-GOING

## 2019-07-01 NOTE — CONSULTS
puff  1 puff Inhalation Q4H PRN Angelic Chin PA-C        efavirenz-emtrictabine-tenofovir (ATRIPLA) 600-200-300 MG per tablet 1 tablet  1 tablet Oral Nightly Brittney Andrade PA-C   1 tablet at 19 3538     Facility-Administered Medications Ordered in Other Encounters   Medication Dose Route Frequency Provider Last Rate Last Dose    0.9 % sodium chloride infusion   Intravenous Continuous Graciela Patton PA-C           Allergies:  Aspirin; Biaxin [clarithromycin]; Levofloxacin; and Vancomycin    Social History:   Social History     Tobacco Use   Smoking Status Current Some Day Smoker    Years: 39.00    Types: Cigarettes    Start date: 1974    Last attempt to quit: 2019    Years since quittin.2   Smokeless Tobacco Never Used     Social History     Substance and Sexual Activity   Alcohol Use No    Alcohol/week: 0.0 oz     Social History     Substance and Sexual Activity   Drug Use No       Family History:  Family History   Problem Relation Age of Onset    Asthma Mother     Heart Disease Mother     Uterine Cancer Mother     Asthma Father     Colon Cancer Father        REVIEW OF SYSTEMS:  Constitutional: Denies any fever, chills. Derm: Denies any rash or skin color change. Eyes: Denies blurred or decreased in vision. Ent: Denies any tinnitus or vertigo. Resp: Denies any cough or shortness of breath. CV: Denies any syncope, palpitations or chest pain. GI:  Denies any abdominal pain, nausea, vomiting, constipation or diarrhea. : Denies any hematuria, hesitancy, or dysuria. Heme/Lymph: Denies any bleeding. Musculoskeletal: Positive for right foot and ankle pain  Neuro: Denies any dizziness, paresthesia or weakness.     PHYSICAL EXAM:  Patient Vitals for the past 24 hrs:   BP Temp Temp src Pulse Resp SpO2 Height Weight   19 0915 (!) 118/56 97.8 °F (36.6 °C) Oral 77 16 95 % -- --   19 0809 -- -- -- -- 16 95 % -- --   19 0345 (!) 100/57 98.3 °F (36.8 °C) Oral 72

## 2019-07-01 NOTE — PROGRESS NOTES
Jose Antoniojose [address] 60  INPATIENT OCCUPATIONAL THERAPY  [facility] ORTHOPEDICS 7K  EVALUATION    Time:   Time In: 820  Time Out: 2531  Timed Code Treatment Minutes: 25 Minutes  Minutes: 37          Date: 2019  Patient Name: Jeff Bourgeois,   Gender: female      MRN: [id number]  : 1960  (62 y.o.)  Referring Practitioner: Rafael Tidwell PA-C  Diagnosis: Hypotension  Additional Pertinent Hx: Per H&P: 62 y.o. female who presented to [address] [place] [address] with multiple complaints, including syncope with collapse, right foot pain, left ankle pain and complaining of weakness and numbness in the entirety of her left arm. Pt has hx of bipolar disorder, schizophrenia, CAD, chronic back pain, CKD and COPD. Of note, pt is a poor historian. She is sitting in bed smiling and laughing, moving her feet and extremities continually. Pt notes that for the past 1-2 years she has been having random episodes of \"dropping to the ground\". Pt states that his occurs without warning. She reports that she has seen a cardiologist and states \"my heart is fine\". Pt states that her blood pressure continually swings between being high and low. BP in ED was 91/52. Pt states that she fell forward on her hands, but somehow caught her feet up underneath her, pushing her toes toward the dorsum of her foot. Pt denies fever/chills/visual changes/CP/palpitations/SOB/dizzness/numbness/tingling. Pt has no further symptoms to report.      Restrictions/Precautions:  Restrictions/Precautions: Fall Risk, General Precautions, Weight Bearing  Right Lower Extremity Weight Bearing: Non Weight Bearing(until gets boot per podiatry)  Left Lower Extremity Weight Bearing: Weight Bearing As Tolerated    Subjective  Chart Reviewed: Yes, Orders, Progress Notes, History and Physical  Patient assessed for rehabilitation services?: Yes  Family / Caregiver Present: No    Subjective: Pleasant and cooperative  Comments: RN ok'd OT    Pain:  Pain Assessment  Patient Currently in Pain: Yes  Pain Assessment: 0-10  Pain Level: 10  Pain Type: Acute pain  Pain Location: Foot  Pain Orientation: Right;Left(pt reports equal between L and R)    Social/Functional History:  Lives With: Alone  Type of Home: Apartment  Home Layout: One level  Home Access: Elevator(2nd floor)  Home Equipment: Wheelchair-electric, 4 wheeled walker   Bathroom Shower/Tub: Tub/Shower unit  Bathroom Toilet: Standard  Bathroom Equipment: Grab bars in shower, Grab bars around toilet, Shower chair(pull cord in bathroom)  Bathroom Accessibility: Wheelchair accessible       ADL Assistance: Independent(pt reports inc difficulty with LBD)  Homemaking Assistance: Independent(pt does all cooking, cleaning, laundry)  Ambulation Assistance: Independent(uses 4WW prn and power w/c as needed for longer distances)  Transfer Assistance: Independent    Active : Yes(does not have car, uses bus or provide a ride)  Occupation: Unemployed  Additional Comments: Pt interested in [employer] services, reports history of falls in home    Cognition/Orientation:  Overall Orientation Status: Within Functional Limits  Cognition Comment: decreased insight, impulsive, decreased safety awareness, self limiting    ADL;s:  Feeding: Modified independent (drinking water)  LE Dressing: Increased time to complete, Stand by assistance(significantly inc time to chon socks within chair)  Toileting: Increased time to complete, Contact guard assistance       Functional Mobility:  Bed mobility  Sit to Supine: Contact guard assistance  Scooting: Contact guard assistance(towards HOB)    Functional Mobility  Functional - Mobility Device: Rolling Walker  Activity: To/from bathroom  Assist Level: Contact guard assistance  Functional Mobility Comments: required mod to max cues for NWB to RLE.  unsteady however no LOB     Balance:  Balance  Sitting Balance: Stand by assistance  Standing Balance: Contact guard assistance  Standing Balance  Time: 45 seconds  Activity: toilet hygiene  Comment: noncomplaint to Wb status, cues provided    Transfers:  Sit to stand: Contact guard assistance  Stand to sit: Contact guard assistance  Toilet Transfers  Toilet - Technique: Ambulating  Equipment Used: Grab bars  Toilet Transfer: Contact guard assistance  Toilet Transfers Comments: cues for safety    Upper Extremity Assessment:   LUE AROM : WNL  RUE AROM : WNL    LUE Strength  LUE Strength Comment: grossly 3+/5  RUE Strength  RUE Strength Comment: grossly 4/5            Activity Tolerance: Patient limited by pain       Assessment:  Assessment: Pt would continue to benefit from skilled OT intervention to maximize pt safety and independence with performing self care tasks and functional mobility and to ensure safe transition to the next level of care and return to OF. Performance deficits / Impairments: Decreased functional mobility , Decreased safe awareness, Decreased balance, Decreased ADL status, Decreased endurance, Decreased high-level IADLs, Decreased strength  Prognosis: Fair  REQUIRES OT FOLLOW UP: Yes    Treatment Initiated: Treatment and education initiated within context of evaluation. Evaluation time included review of current medical information, gathering information related to past medical, social and functional history, completion of standardized testing, formal and informal observation of tasks, assessment of data and development of plan of care and goals. Treatment time included skilled education and facilitation of tasks to increase safety and independence with ADL's for improved functional independence and quality of life.     Discharge Recommendations:  Continue to assess pending progress, Home with Home health OT    Patient Education:  Patient Education: OT role, POC, goals, safety, t/f training, WB status, ADLs    Equipment Recommendations:  Equipment Needed: No    Plan:  Times per week: 5x  Current Treatment Recommendations: Strengthening,

## 2019-07-01 NOTE — PROGRESS NOTES
Assessment and Plan:        1. Fall, Pre-syncope, left arm tingling: Was aware of fall per my history. Just became weak and fell. Left arm with tingling and weak feeling for past week. Vague symptoms. Will discuss with neurology. Doesn't sound like arrhytmia  2. Rt foot fracture of multiple metatarsals: follow up podiatry outpatient  3. HIV: continue atripla  4. Hx bipolar disorder: continue latuda      CC:  syncope  HPI: 62 y.o. female who presented to [facility] with multiple complaints, including syncope with collapse, right foot pain, left ankle pain and complaining of weakness and numbness in the entirety of her left arm. Pt has hx of bipolar disorder, schizophrenia, CAD, chronic back pain, CKD and COPD. Of note, pt is a poor historian. She is sitting in bed smiling and laughing, moving her feet and extremities continually. Pt notes that for the past 1-2 years she has been having random episodes of \"dropping to the ground\". Pt states that his occurs without warning. She reports that she has seen a cardiologist and states \"my heart is fine\". Pt states that her blood pressure continually swings between being high and low. BP in ED was 91/52. Pt states that she fell forward on her hands, but somehow caught her feet up underneath her, pushing her toes toward the dorsum of her foot. Pt denies fever/chills/visual changes/CP/palpitations/SOB/dizzness/numbness/tingling. Pt has no further symptoms to report. ROS (12 point review of systems completed. Pertinent positives noted. Otherwise ROS is negative) : Patient has a vague constellation of symptoms, but states she has has symptoms of weakness and pre-syncope a couple of times per month. She was aware of the entire fall. PMH:  Per HPI  Allergies:    Allergies   Allergen Reactions    Aspirin Swelling     Taking at present, states history of liver swelling from asa    Biaxin [Clarithromycin] Swelling    Levofloxacin Swelling    Vancomycin

## 2019-07-02 ENCOUNTER — APPOINTMENT (OUTPATIENT)
Dept: MRI IMAGING | Age: 59
End: 2019-07-02
Payer: COMMERCIAL

## 2019-07-02 VITALS
RESPIRATION RATE: 16 BRPM | TEMPERATURE: 98.7 F | BODY MASS INDEX: 27.22 KG/M2 | HEIGHT: 65 IN | OXYGEN SATURATION: 95 % | WEIGHT: 163.4 LBS | DIASTOLIC BLOOD PRESSURE: 62 MMHG | HEART RATE: 71 BPM | SYSTOLIC BLOOD PRESSURE: 134 MMHG

## 2019-07-02 LAB
FOLATE: 18.7 NG/ML (ref 4.8–24.2)
VITAMIN B-12: 265 PG/ML (ref 211–911)

## 2019-07-02 PROCEDURE — 82746 ASSAY OF FOLIC ACID SERUM: CPT

## 2019-07-02 PROCEDURE — 99217 PR OBSERVATION CARE DISCHARGE MANAGEMENT: CPT | Performed by: INTERNAL MEDICINE

## 2019-07-02 PROCEDURE — G0378 HOSPITAL OBSERVATION PER HR: HCPCS

## 2019-07-02 PROCEDURE — 97530 THERAPEUTIC ACTIVITIES: CPT

## 2019-07-02 PROCEDURE — 96372 THER/PROPH/DIAG INJ SC/IM: CPT

## 2019-07-02 PROCEDURE — 6360000002 HC RX W HCPCS: Performed by: PHYSICIAN ASSISTANT

## 2019-07-02 PROCEDURE — 6370000000 HC RX 637 (ALT 250 FOR IP): Performed by: PHYSICIAN ASSISTANT

## 2019-07-02 PROCEDURE — 94640 AIRWAY INHALATION TREATMENT: CPT

## 2019-07-02 PROCEDURE — 36415 COLL VENOUS BLD VENIPUNCTURE: CPT

## 2019-07-02 PROCEDURE — 97110 THERAPEUTIC EXERCISES: CPT

## 2019-07-02 PROCEDURE — 70551 MRI BRAIN STEM W/O DYE: CPT

## 2019-07-02 PROCEDURE — 97162 PT EVAL MOD COMPLEX 30 MIN: CPT

## 2019-07-02 PROCEDURE — 82607 VITAMIN B-12: CPT

## 2019-07-02 PROCEDURE — 83090 ASSAY OF HOMOCYSTEINE: CPT

## 2019-07-02 RX ORDER — IBUPROFEN 600 MG/1
600 TABLET ORAL 3 TIMES DAILY PRN
Qty: 90 TABLET | Refills: 5 | Status: SHIPPED | OUTPATIENT
Start: 2019-07-02 | End: 2020-09-14 | Stop reason: ALTCHOICE

## 2019-07-02 RX ADMIN — METOPROLOL TARTRATE 12.5 MG: 25 TABLET ORAL at 08:16

## 2019-07-02 RX ADMIN — IPRATROPIUM BROMIDE AND ALBUTEROL SULFATE 3 ML: .5; 3 SOLUTION RESPIRATORY (INHALATION) at 10:30

## 2019-07-02 RX ADMIN — BUPROPION HYDROCHLORIDE 150 MG: 150 TABLET, EXTENDED RELEASE ORAL at 08:16

## 2019-07-02 RX ADMIN — LURASIDONE HYDROCHLORIDE 40 MG: 40 TABLET, FILM COATED ORAL at 08:16

## 2019-07-02 RX ADMIN — IPRATROPIUM BROMIDE AND ALBUTEROL SULFATE 3 ML: .5; 3 SOLUTION RESPIRATORY (INHALATION) at 14:51

## 2019-07-02 RX ADMIN — ENOXAPARIN SODIUM 40 MG: 40 INJECTION SUBCUTANEOUS at 08:17

## 2019-07-02 RX ADMIN — GUAIFENESIN 600 MG: 600 TABLET, EXTENDED RELEASE ORAL at 08:16

## 2019-07-02 RX ADMIN — HYDROCODONE BITARTRATE AND ACETAMINOPHEN 1 TABLET: 5; 325 TABLET ORAL at 05:44

## 2019-07-02 ASSESSMENT — PAIN SCALES - GENERAL
PAINLEVEL_OUTOF10: 7
PAINLEVEL_OUTOF10: 10
PAINLEVEL_OUTOF10: 7

## 2019-07-02 ASSESSMENT — PAIN DESCRIPTION - PROGRESSION: CLINICAL_PROGRESSION: NOT CHANGED

## 2019-07-02 ASSESSMENT — PAIN DESCRIPTION - PAIN TYPE: TYPE: ACUTE PAIN

## 2019-07-02 ASSESSMENT — PAIN DESCRIPTION - DESCRIPTORS: DESCRIPTORS: SHARP;SHOOTING

## 2019-07-02 ASSESSMENT — PAIN - FUNCTIONAL ASSESSMENT: PAIN_FUNCTIONAL_ASSESSMENT: PREVENTS OR INTERFERES SOME ACTIVE ACTIVITIES AND ADLS

## 2019-07-02 ASSESSMENT — PAIN DESCRIPTION - ONSET: ONSET: ON-GOING

## 2019-07-02 ASSESSMENT — PAIN DESCRIPTION - FREQUENCY: FREQUENCY: CONTINUOUS

## 2019-07-02 ASSESSMENT — PAIN DESCRIPTION - LOCATION: LOCATION: FOOT

## 2019-07-02 ASSESSMENT — PAIN DESCRIPTION - ORIENTATION: ORIENTATION: RIGHT;LEFT

## 2019-07-02 NOTE — PROGRESS NOTES
Stand: Stand by assistance(from EOB and toilet)  Stand to sit: Stand by assistance       Ambulation 1  Surface: level tile  Device: Rolling Walker  Other Apparatus: Right(boot)  Assistance: Stand by assistance  Gait Deviations: Slow Elsei, Decreased step length, Decreased step height  Distance: 225 feet, 20 feet to bathroom             Exercises:  Comments: Pt performs seated B LE AROM while EOB: ankle pumps L, marches and LAQ x 10 reps to increase strength for improved gait. Activity Tolerance:  Activity Tolerance: Patient Tolerated treatment well    Treatment Initiated: See above exercises, additional in room ambulation. Assessment: Body structures, Functions, Activity limitations: Decreased functional mobility , Decreased strength, Decreased balance  Assessment: Pt tolerates session well, amb in hallway with RW without difficulty. Pt ed to use rollator walker at home, verbalizes good understanding. PT to continue to progress strength and functional mobility. Prognosis: Excellent    Clinical Presentation: Moderate - Evolving with Changing Characteristics: see assessment    Decision Making: High Complexitybased on patient assessment and decision making process of determining plan of care and establishing reasonable expectations for measurable functional outcomes    REQUIRES PT FOLLOW UP: Yes    Discharge Recommendations:  Discharge Recommendations: Home with assist PRN    Patient Education:  Patient Education: POC    Equipment Recommendations:  Equipment Needed: No  Other: has rollator    Safety:  Type of devices:  All fall risk precautions in place, Call light within reach, Patient at risk for falls, Left in bed    Plan:  Times per week: 1-2 X O  Current Treatment Recommendations: Strengthening, Gait Training, Patient/Caregiver Education & Training, Balance Training, Functional Mobility Training, Transfer Training, Equipment Evaluation, Education, & procurement    Goals:  Patient goals : to go home  Short term goals  Time Frame for Short term goals: by discharge  Short term goal 1: Pt to transfer sit <--> stand S for increased functional mobility. Short term goal 2: Pt to ambulate >500 feet with RW S for household ambulation. Long term goals  Time Frame for Long term goals : NA due to short length of stay. Evaluation Complexity: Based on the findings of patient history, examination, clinical presentation, and decision making during this evaluation, the evaluation of Flash Bade  is of medium complexity.

## 2019-07-02 NOTE — CONSULTS
history of present illness. Physical Exam:  /62   Pulse 71   Temp 98.7 °F (37.1 °C) (Oral)   Resp 16   Ht 5' 5\" (1.651 m)   Wt 163 lb 6.4 oz (74.1 kg)   LMP  (Exact Date)   SpO2 95%   BMI 27.19 kg/m²  I Body mass index is 27.19 kg/m². I Wt Readings from Last 1 Encounters:   06/30/19 163 lb 6.4 oz (74.1 kg)           General appearance - alert, well appearing, and in no distress, oriented to *** person, place, and time and ***weight  Mental status -Level of Alertness: {ALERTNESS:235609718::\"awake\"}  Orientation: {ORIENTATION:951888273::\"person\",\"place\",\"time\"}  Memory: {NORMAL/ABNORMAL:156881068::\"normal\"}  Fund of Knowledge: {NORMAL/ABNORMAL:202495911::\"normal\"}  Attention/Concentration: {NORMAL/ABNORMAL:145430955::\"normal\"}  Language: {NORMAL/ABNORMAL:605982187::\"normal\"}. Mood is {NORMAL/HIGH/FLAT:776017765::\"Anxious\"}  Neck - supple, no significant adenopathy, carotids upstroke normal bilaterally, no carotid bruits. There is no LAP in the neck. There is no thyroid enlargement. Neurological -   Cranial Fpaolr-PL-KGH:   Cranial nerve II: Normal. There is full visual fields***  Cranial nerve III: Pupils: equal, round, reactive to light   Cranial nerves III, IV, VI: Extraocular Movements: intact   Cranial nerve V: Facial sensation: intact   Cranial nerve VII:Facial strength: intact   Cranial nerve VIII: Hearing: intact ***wears hearing aids***  Cranial nerve IX: Palate Elevation intact bilaterally  Cranial nerve XI: Shoulder shrug intact bilaterally  Cranial nerve XII: Tongue midline   neck supple without rigidity***  DTR's are decreased distal and symmetric  Babinski sign is negative on left. Motor exam is 5/5 in the upper and lower extremities. Normal muscle tone . There is no muscle atrophy. There is no muscle fasciculation. Right foot is in cast  Sensory is intact forlight touch and cortical sensation .    Coordination: finger to nose intact  Gait and station not tested  Abnormal movement no intra-or extra-axial collections. There is no hydrocephalus, midline shift or mass effect. There is no susceptibility artifact in the brain. The major intracranial vascular flow voids are present. The midline craniocervical junction structures are normal.  The pituitary gland and brainstem are normal.          Impression    1. No evidence of an acute infarct. 2. Minimal severity chronic small vessel ischemic changes. 3. 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 **This report has been created using voice recognition software. It may contain minor errors which are inherent in voice recognition technology. **    Final report electronically signed by Dr. Gatito Ferguson on 11/14/2018 3:51 PM     No results found for this or any previous visit. No results found for this or any previous visit. Results for orders placed during the hospital encounter of 06/30/19   CT HEAD WO CONTRAST    Narrative PROCEDURE: NONCONTRAST CT BRAIN    CLINICAL INFORMATION: Syncope; patient thinks she had a stroke on Tuesday    COMPARISON: 11/14/2018    TECHNIQUE: Multiple axial 5 mm images of the brain were obtained without administration of intravenous contrast material. ALL CT SCANS AT THIS FACILITY use dose modulation, iterative reconstruction, and/or weight-based dosing when appropriate to reduce   radiation dose to as low as reasonably achievable. FINDINGS:    Lateral, third, fourth ventricles: Normal in size, contour, position. .. Parenchyma:  No acute infarction, mass lesion, or intracranial hemorrhage is seen. Mastoid processes: Well aerated. Normal in appearance. .       Paranasal sinuses/calvarium: The frontal sinuses are aplastic. The other paranasal sinuses are unremarkable. There is pankaj bullosa involving both middle nasal turbinates. Impression Normal noncontrast CT of the brain. No acute intracranial process. **This report has been created using voice recognition software.   It may

## 2019-07-02 NOTE — PROGRESS NOTES
Discharge instructions and green packet reviewed with patient. Denies any concerns at this time. Patient instructed to call and make follow up appointment with Dr. Letty Cam and to complete tilt table test and EEG as an outpatient. Patient voices understanding. Patient not very attentive during discharge education, and was more focused on getting home. Patient instructed to stay on the bench in [place] and cab company will be here within 30 minutes.

## 2019-07-03 ENCOUNTER — TELEPHONE (OUTPATIENT)
Dept: FAMILY MEDICINE CLINIC | Age: 59
End: 2019-07-03

## 2019-07-03 LAB — HOMOCYSTEINE, TOTAL: 8 UMOL/L

## 2019-07-03 NOTE — CARE COORDINATION
7/3/19, 10:27 AM    DISCHARGE BARRIERS      Spoke with [facility] RN. She is aware of discharge yesterday and has discharge instructions. She is planning to see Maral Cowan today. 7/3/19, 10:28 AM    Discharge plan discussed by  and . Discharge plan reviewed with patient/ family. Patient/ family verbalize understanding of discharge plan and are in agreement with plan. Understanding was demonstrated using the teach back method.    Services After Discharge  Services At/After Discharge: Nursing Services, Aide Services

## 2019-07-05 RX ORDER — MIRTAZAPINE 30 MG/1
TABLET, FILM COATED ORAL
Qty: 30 TABLET | Refills: 11 | Status: SHIPPED | OUTPATIENT
Start: 2019-07-05 | End: 2020-07-13

## 2019-07-09 ENCOUNTER — TELEPHONE (OUTPATIENT)
Dept: UROLOGY | Age: 59
End: 2019-07-09

## 2019-07-09 RX ORDER — GUAIFENESIN 600 MG/1
TABLET, EXTENDED RELEASE ORAL
Qty: 60 TABLET | Refills: 11 | Status: SHIPPED | OUTPATIENT
Start: 2019-07-09 | End: 2020-06-24 | Stop reason: SDUPTHER

## 2019-07-09 RX ORDER — DOXEPIN HYDROCHLORIDE 50 MG/1
CAPSULE ORAL
Refills: 0 | COMMUNITY
Start: 2019-06-25 | End: 2021-03-09 | Stop reason: DRUGHIGH

## 2019-07-09 RX ORDER — EFAVIRENZ, EMTRICITABINE AND TENOFOVIR DISOPROXIL FUMARATE 600; 200; 300 MG/1; MG/1; MG/1
1 TABLET, FILM COATED ORAL
COMMUNITY
Start: 2019-05-08 | End: 2020-06-24

## 2019-07-09 NOTE — TELEPHONE ENCOUNTER
Prior Auth initiated for [employer] . PA sent to [facility].     Approved until 7/2020

## 2019-07-10 RX ORDER — IBUPROFEN 800 MG/1
800 TABLET ORAL
COMMUNITY
End: 2019-07-10

## 2019-07-11 ENCOUNTER — TELEPHONE (OUTPATIENT)
Dept: FAMILY MEDICINE CLINIC | Age: 59
End: 2019-07-11
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
## 2019-07-18 ENCOUNTER — TELEPHONE (OUTPATIENT)
Dept: FAMILY MEDICINE CLINIC | Age: 59
End: 2019-07-18

## 2019-07-23 ENCOUNTER — HOSPITAL ENCOUNTER (OUTPATIENT)
Dept: NEUROLOGY | Age: 59
Discharge: HOME | End: 2019-07-23
Payer: COMMERCIAL

## 2019-07-23 DIAGNOSIS — I95.1 ORTHOSTATIC HYPOTENSION: ICD-10-CM

## 2019-07-23 PROCEDURE — 95819 EEG AWAKE AND ASLEEP: CPT
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
## 2019-07-24 NOTE — PROCEDURES
[address] [place] [address]                          ELECTROENCEPHALOGRAM REPORT    PATIENT NAME: Michael Niño                     :        1960  MED REC NO:   [id number]                           ROOM:  ACCOUNT NO:   [de-identified]                           ADMIT DATE: 2019  PROVIDER:     Gloria Watson MD    DATE OF EE2019    REFERRING PROVIDER:  Gloria Watson MD    CLINICAL HISTORY:  This is a 66-year-old female with speech disturbance,  syncope, left arm numbness. Evaluate for seizure. CLINICAL INTERPRETATION:  This is a 17-channel EEG performed with sleep  deprivation. Hyperventilation and photic stimulation were performed. The patient is described as alert. The background rhythm activity is noted to be 9 to 10 Hz in the  posterior parietal area, symmetric, well modulated, attenuates with eye  opening. Hyperventilation was performed for 3 minutes with good effort  without abnormality. Lead and muscle artifacts were noted limiting  quality of data obtained. Photic stimulation was performed without  abnormality. There was no evidence of epileptiform activity  appreciated. IMPRESSION:  This is a normal EEG. There was no evidence of  epileptiform activity appreciated.         Marko Jernigan MD    D: 2019 10:14:33       T: 2019 10:22:34     [id number]  Job#: [id number]     Doc#: [id number]    CC:

## 2019-08-03 NOTE — DISCHARGE SUMMARY
podiatry outpatient. Exam:     Vitals:  Vitals:    07/01/19 2141 07/01/19 2345 07/02/19 0402 07/02/19 0806   BP: 123/65 128/60  134/62   Pulse: 75 74  71   Resp: 16 16  16   Temp: 98.5 °F (36.9 °C) 98 °F (36.7 °C) 97.6 °F (36.4 °C) 98.7 °F (37.1 °C)   TempSrc: Oral Oral Oral Oral   SpO2: 93% 94% 94% 95%   Weight:       Height:         Weight: Weight: 163 lb 6.4 oz (74.1 kg)     24 hour intake/output:No intake or output data in the 24 hours ending 08/03/19 1420      General appearance:  No apparent distress, appears stated age and cooperative. HEENT:  Normal cephalic, atraumatic without obvious deformity. Pupils equal, round, and reactive to light. Extra ocular muscles intact. Conjunctivae/corneas clear. Neck: Supple, with full range of motion. No jugular venous distention. Trachea midline. Respiratory:  Normal respiratory effort. Clear to auscultation, bilaterally without Rales/Wheezes/Rhonchi. Cardiovascular:  Regular rate and rhythm with normal S1/S2 without murmurs, rubs or gallops. Abdomen: Soft, non-tender, non-distended with normal bowel sounds. Musculoskeletal:  No clubbing, cyanosis or edema bilaterally. Full range of motion without deformity. Skin: Skin color, texture, turgor normal.  No rashes or lesions. Neurologic:  Neurovascularly intact without any focal sensory/motor deficits. Cranial nerves: II-XII intact, grossly non-focal.  Psychiatric:  Alert and oriented, thought content appropriate, normal insight  Capillary Refill: Brisk,< 3 seconds   Peripheral Pulses: +2 palpable, equal bilaterally       Labs:  For convenience and continuity at follow-up the following most recent labs are provided:      CBC:    Lab Results   Component Value Date    WBC 5.4 07/01/2019    HGB 11.7 07/01/2019    HCT 36.3 07/01/2019     07/01/2019       Renal:    Lab Results   Component Value Date     07/01/2019    K 3.7 07/01/2019    K 3.9 03/19/2019     07/01/2019    CO2 23 07/01/2019    BUN 8

## 2019-08-05 ENCOUNTER — TELEPHONE (OUTPATIENT)
Dept: FAMILY MEDICINE CLINIC | Age: 59
End: 2019-08-05

## 2019-08-12 ENCOUNTER — HOSPITAL ENCOUNTER (OUTPATIENT)
Dept: NON INVASIVE DIAGNOSTICS | Age: 59
Discharge: HOME | End: 2019-08-12
Payer: COMMERCIAL

## 2019-08-12 PROCEDURE — 93660 TILT TABLE EVALUATION: CPT

## 2019-08-12 PROCEDURE — 2709999900 HC NON-CHARGEABLE SUPPLY

## 2019-08-13 NOTE — PROCEDURES
[address] [place] [address]                                TILT TABLE TEST    PATIENT NAME: Eva Marshall                     :        1960  MED REC NO:   [id number]                           ROOM:  ACCOUNT NO:   [de-identified]                           ADMIT DATE: 2019  PROVIDER:     Venessa Dakins, MD    DATE OF STUDY:  2019    DESCRIPTION OF PROCEDURE:  After informed consent was obtained, the  patient was brought to the electrophysiology laboratory in a fasting,  non-sedated state, and in no acute distress. Under continuous blood  pressure, heart rate, and EKG monitoring, her bed was tilted to about 70  degrees. Prior to tilting, her blood pressure was 111/65 with a heart  rate of 87 beats per minute. Immediately after tilting, her blood  pressure could not be recorded. This is likely due to the patient was  moving significantly. At 3 minutes of tilt table study, her blood pressure was 115/75 with a  heart rate of 88 beats per minute. During the next 10 minutes, her  blood remained stable at 119/72 with a heart rate of 91 beats per  minute. At 20 minutes into tilt table study, her blood pressure was  122/77 with a heart rate of 89 beats per minute. At 30 minutes of tilt  table, her blood pressure was 119/61 with a heart of 90 beats per  minute. During the entire tilt table study, her blood pressure and heart rate  remained stable as well as her EKG. The patient did not complain of any  symptoms such as dizziness, lightheadedness, or presyncopal symptoms. SUMMARY:  This is a negative tilt table study. RECOMMENDATION:  Clinical correlation is necessary.         eHlga Palmer MD    D: 2019 15:18:22       T: 2019 15:54:16     [id number]  Job#: [id number]     Doc#: [id number]    CC:

## 2019-08-26 ENCOUNTER — OFFICE VISIT (OUTPATIENT)
Dept: FAMILY MEDICINE CLINIC | Age: 59
End: 2019-08-26
Payer: COMMERCIAL

## 2019-08-26 VITALS
WEIGHT: 161.9 LBS | DIASTOLIC BLOOD PRESSURE: 72 MMHG | HEART RATE: 72 BPM | HEIGHT: 65 IN | BODY MASS INDEX: 26.98 KG/M2 | RESPIRATION RATE: 16 BRPM | SYSTOLIC BLOOD PRESSURE: 124 MMHG

## 2019-08-26 DIAGNOSIS — L02.11 ABSCESS, NECK: Primary | ICD-10-CM

## 2019-08-26 DIAGNOSIS — Z12.31 ENCOUNTER FOR SCREENING MAMMOGRAM FOR BREAST CANCER: ICD-10-CM

## 2019-08-26 PROCEDURE — G8427 DOCREV CUR MEDS BY ELIG CLIN: HCPCS | Performed by: FAMILY MEDICINE

## 2019-08-26 PROCEDURE — 4004F PT TOBACCO SCREEN RCVD TLK: CPT | Performed by: FAMILY MEDICINE

## 2019-08-26 PROCEDURE — G8417 CALC BMI ABV UP PARAM F/U: HCPCS | Performed by: FAMILY MEDICINE

## 2019-08-26 PROCEDURE — G8598 ASA/ANTIPLAT THER USED: HCPCS | Performed by: FAMILY MEDICINE

## 2019-08-26 PROCEDURE — 3017F COLORECTAL CA SCREEN DOC REV: CPT | Performed by: FAMILY MEDICINE

## 2019-08-26 PROCEDURE — 99213 OFFICE O/P EST LOW 20 MIN: CPT | Performed by: FAMILY MEDICINE

## 2019-08-26 RX ORDER — SULFAMETHOXAZOLE AND TRIMETHOPRIM 800; 160 MG/1; MG/1
1 TABLET ORAL 2 TIMES DAILY
Qty: 20 TABLET | Refills: 0 | Status: SHIPPED | OUTPATIENT
Start: 2019-08-26 | End: 2019-09-05

## 2019-08-26 ASSESSMENT — ENCOUNTER SYMPTOMS
RESPIRATORY NEGATIVE: 1
GASTROINTESTINAL NEGATIVE: 1
ROS SKIN COMMENTS: MASS ON NECK

## 2019-08-26 NOTE — PROGRESS NOTES
APRN - CNP   omeprazole (PRILOSEC) 40 MG delayed release capsule Take 1 capsule by mouth 2 times daily 9/26/18  Yes Erica Tabor DO   lurasidone (LATUDA) 40 MG TABS tablet Take 40 mg by mouth daily   Yes Historical Provider, MD   Multiple Vitamin (MULTI VITAMIN DAILY PO) Take by mouth   Yes Historical Provider, MD   aclidinium (TUDORZA PRESSAIR) 400 MCG/ACT AEPB inhaler inhale 1 puff INTO THE LUNGS twice a day 8/9/18  Yes Erica Tabor DO   OXYGEN Inhale 2 L into the lungs nightly   Yes Historical Provider, MD       Review of Systems   Constitutional: Negative. HENT: Negative. Respiratory: Negative. Cardiovascular: Negative. Gastrointestinal: Negative. Musculoskeletal: Negative. Skin:        Mass on neck     All other systems reviewed and are negative. Objective:   Physical Exam   Constitutional: She is oriented to person, place, and time. She appears well-developed and well-nourished. HENT:   Head: Normocephalic and atraumatic. Right Ear: Tympanic membrane normal.   Left Ear: Tympanic membrane normal.   Mouth/Throat: Oropharynx is clear and moist and mucous membranes are normal.   Neck:       Cardiovascular: Normal rate, regular rhythm and normal heart sounds. No murmur heard. Pulmonary/Chest: Effort normal and breath sounds normal.   Abdominal: Soft. Bowel sounds are normal.   Musculoskeletal: She exhibits no edema. Neurological: She is alert and oriented to person, place, and time. Skin: Skin is warm and dry. Psychiatric: She has a normal mood and affect. Her behavior is normal.   Nursing note and vitals reviewed. Assessment:       Diagnosis Orders   1. Abscess, neck  sulfamethoxazole-trimethoprim (BACTRIM DS) 800-160 MG per tablet   2.  Encounter for screening mammogram for breast cancer  KATHY Digital Screen Bilateral [[id number]]           Plan:      -  Warm compresses  -  rx Bactrim  -  Schedule mammogram  -  RTO prn        Erica Tabor DO

## 2019-08-29 ENCOUNTER — HOSPITAL ENCOUNTER (OUTPATIENT)
Dept: WOMENS IMAGING | Age: 59
Discharge: HOME | End: 2019-08-29
Payer: COMMERCIAL

## 2019-08-29 DIAGNOSIS — Z12.31 ENCOUNTER FOR SCREENING MAMMOGRAM FOR BREAST CANCER: ICD-10-CM

## 2019-08-29 PROCEDURE — 77063 BREAST TOMOSYNTHESIS BI: CPT

## 2019-09-10 ENCOUNTER — TELEPHONE (OUTPATIENT)
Dept: FAMILY MEDICINE CLINIC | Age: 59
End: 2019-09-10

## 2019-09-30 RX ORDER — OMEPRAZOLE 40 MG/1
CAPSULE, DELAYED RELEASE ORAL
Qty: 60 CAPSULE | Refills: 11 | Status: SHIPPED | OUTPATIENT
Start: 2019-09-30 | End: 2020-02-03

## 2019-10-11 ENCOUNTER — TELEPHONE (OUTPATIENT)
Dept: FAMILY MEDICINE CLINIC | Age: 59
End: 2019-10-11

## 2019-10-18 RX ORDER — ALBUTEROL SULFATE 90 UG/1
AEROSOL, METERED RESPIRATORY (INHALATION)
Qty: 18 G | Refills: 3 | Status: SHIPPED | OUTPATIENT
Start: 2019-10-18 | End: 2020-01-13

## 2019-10-22 ENCOUNTER — TELEPHONE (OUTPATIENT)
Dept: FAMILY MEDICINE CLINIC | Age: 59
End: 2019-10-22

## 2019-10-31 DIAGNOSIS — J44.9 CHRONIC OBSTRUCTIVE PULMONARY DISEASE, UNSPECIFIED COPD TYPE (HCC): ICD-10-CM

## 2019-10-31 RX ORDER — IPRATROPIUM BROMIDE AND ALBUTEROL SULFATE 2.5; .5 MG/3ML; MG/3ML
SOLUTION RESPIRATORY (INHALATION)
Qty: 360 ML | Refills: 11 | Status: SHIPPED | OUTPATIENT
Start: 2019-10-31 | End: 2020-06-24 | Stop reason: SDUPTHER

## 2019-10-31 RX ORDER — MIRABEGRON 25 MG/1
TABLET, FILM COATED, EXTENDED RELEASE ORAL
Qty: 30 TABLET | Refills: 3 | Status: SHIPPED | OUTPATIENT
Start: 2019-10-31 | End: 2020-04-06 | Stop reason: SDUPTHER

## 2019-11-27 RX ORDER — IBUPROFEN 600 MG/1
TABLET ORAL
Qty: 90 TABLET | Refills: 0 | Status: SHIPPED | OUTPATIENT
Start: 2019-11-27 | End: 2019-12-30

## 2019-12-30 RX ORDER — IBUPROFEN 600 MG/1
TABLET ORAL
Qty: 90 TABLET | Refills: 0 | Status: SHIPPED | OUTPATIENT
Start: 2019-12-30 | End: 2020-06-24

## 2020-01-06 PROBLEM — A63.0 CONDYLOMA ACUMINATUM: Status: ACTIVE | Noted: 2020-01-06

## 2020-01-06 PROBLEM — M85.80 OSTEOPENIA: Status: ACTIVE | Noted: 2020-01-06

## 2020-01-06 PROBLEM — R87.622 LGSIL PAP SMEAR OF VAGINA: Status: ACTIVE | Noted: 2020-01-06

## 2020-01-06 PROBLEM — I21.9 MI (MYOCARDIAL INFARCTION) (HCC): Status: ACTIVE | Noted: 2020-01-06

## 2020-01-06 PROBLEM — E78.00 PURE HYPERCHOLESTEROLEMIA: Status: ACTIVE | Noted: 2020-01-06

## 2020-01-13 ENCOUNTER — TELEPHONE (OUTPATIENT)
Dept: FAMILY MEDICINE CLINIC | Age: 60
End: 2020-01-13

## 2020-01-13 RX ORDER — ALBUTEROL SULFATE 90 UG/1
AEROSOL, METERED RESPIRATORY (INHALATION)
Qty: 18 G | Refills: 3 | Status: SHIPPED | OUTPATIENT
Start: 2020-01-13 | End: 2020-06-11

## 2020-01-16 ENCOUNTER — TELEPHONE (OUTPATIENT)
Dept: FAMILY MEDICINE CLINIC | Age: 60
End: 2020-01-16

## 2020-01-17 RX ORDER — LACTOSE-REDUCED FOOD
LIQUID (ML) ORAL
Qty: 120 CAN | Refills: 11 | Status: SHIPPED | OUTPATIENT
Start: 2020-01-17 | End: 2020-06-24

## 2020-01-17 RX ORDER — LACTOSE-REDUCED FOOD
LIQUID (ML) ORAL
Qty: 120 CAN | Refills: 11 | Status: SHIPPED | OUTPATIENT
Start: 2020-01-17 | End: 2020-01-17 | Stop reason: SDUPTHER

## 2020-01-17 NOTE — TELEPHONE ENCOUNTER
Patient called back with fax # [phone] [facility]. Says they will deliver to her. I faxed original rx to them. I spoke with China Coulter at [address] [place] [address] and cancelled rx with them.

## 2020-01-22 ENCOUNTER — TELEPHONE (OUTPATIENT)
Dept: FAMILY MEDICINE CLINIC | Age: 60
End: 2020-01-22

## 2020-01-23 ENCOUNTER — HOSPITAL ENCOUNTER (OUTPATIENT)
Age: 60
Discharge: HOME | End: 2020-01-23
Payer: COMMERCIAL

## 2020-01-23 LAB
ALBUMIN SERPL-MCNC: 4.7 G/DL (ref 3.5–5.1)
ALP BLD-CCNC: 180 U/L (ref 38–126)
ALT SERPL-CCNC: 15 U/L (ref 11–66)
ANION GAP SERPL CALCULATED.3IONS-SCNC: 14 MEQ/L (ref 8–16)
AST SERPL-CCNC: 17 U/L (ref 5–40)
BASOPHILS # BLD: 2.3 %
BASOPHILS ABSOLUTE: 0.1 THOU/MM3 (ref 0–0.1)
BILIRUB SERPL-MCNC: 0.2 MG/DL (ref 0.3–1.2)
BILIRUBIN DIRECT: < 0.2 MG/DL (ref 0–0.3)
BUN BLDV-MCNC: 12 MG/DL (ref 7–22)
CALCIUM SERPL-MCNC: 9.3 MG/DL (ref 8.5–10.5)
CHLORIDE BLD-SCNC: 103 MEQ/L (ref 98–111)
CO2: 23 MEQ/L (ref 23–33)
CREAT SERPL-MCNC: 0.7 MG/DL (ref 0.4–1.2)
EOSINOPHIL # BLD: 1.7 %
EOSINOPHILS ABSOLUTE: 0.1 THOU/MM3 (ref 0–0.4)
ERYTHROCYTE [DISTWIDTH] IN BLOOD BY AUTOMATED COUNT: 13 % (ref 11.5–14.5)
ERYTHROCYTE [DISTWIDTH] IN BLOOD BY AUTOMATED COUNT: 44.8 FL (ref 35–45)
GFR SERPL CREATININE-BSD FRML MDRD: 86 ML/MIN/1.73M2
GLUCOSE BLD-MCNC: 87 MG/DL (ref 70–108)
HCT VFR BLD CALC: 45.5 % (ref 37–47)
HEMOGLOBIN: 14.9 GM/DL (ref 12–16)
IMMATURE GRANS (ABS): 0.02 THOU/MM3 (ref 0–0.07)
IMMATURE GRANULOCYTES: 0.3 %
LYMPHOCYTES # BLD: 41.1 %
LYMPHOCYTES ABSOLUTE: 2.6 THOU/MM3 (ref 1–4.8)
MCH RBC QN AUTO: 31.1 PG (ref 26–33)
MCHC RBC AUTO-ENTMCNC: 32.7 GM/DL (ref 32.2–35.5)
MCV RBC AUTO: 95 FL (ref 81–99)
MONOCYTES # BLD: 7.3 %
MONOCYTES ABSOLUTE: 0.5 THOU/MM3 (ref 0.4–1.3)
NUCLEATED RED BLOOD CELLS: 0 /100 WBC
PHOSPHORUS: 3.7 MG/DL (ref 2.4–4.7)
PLATELET # BLD: 325 THOU/MM3 (ref 130–400)
PMV BLD AUTO: 10.2 FL (ref 9.4–12.4)
POTASSIUM SERPL-SCNC: 4.1 MEQ/L (ref 3.5–5.2)
RBC # BLD: 4.79 MILL/MM3 (ref 4.2–5.4)
SEG NEUTROPHILS: 47.3 %
SEGMENTED NEUTROPHILS ABSOLUTE COUNT: 3 THOU/MM3 (ref 1.8–7.7)
SODIUM BLD-SCNC: 140 MEQ/L (ref 135–145)
TOTAL PROTEIN: 7.9 G/DL (ref 6.1–8)
WBC # BLD: 6.4 THOU/MM3 (ref 4.8–10.8)

## 2020-01-23 PROCEDURE — 84075 ASSAY ALKALINE PHOSPHATASE: CPT

## 2020-01-23 PROCEDURE — 82248 BILIRUBIN DIRECT: CPT

## 2020-01-23 PROCEDURE — 84155 ASSAY OF PROTEIN SERUM: CPT

## 2020-01-23 PROCEDURE — 87901 NFCT AGT GNTYP ALYS HIV1 REV: CPT

## 2020-01-23 PROCEDURE — 85025 COMPLETE CBC W/AUTO DIFF WBC: CPT

## 2020-01-23 PROCEDURE — 86361 T CELL ABSOLUTE COUNT: CPT

## 2020-01-23 PROCEDURE — 82247 BILIRUBIN TOTAL: CPT

## 2020-01-23 PROCEDURE — 84460 ALANINE AMINO (ALT) (SGPT): CPT

## 2020-01-23 PROCEDURE — 80069 RENAL FUNCTION PANEL: CPT

## 2020-01-23 PROCEDURE — 87536 HIV-1 QUANT&REVRSE TRNSCRPJ: CPT

## 2020-01-23 PROCEDURE — 81381 HLA I TYPING 1 ALLELE HR: CPT

## 2020-01-23 PROCEDURE — 36415 COLL VENOUS BLD VENIPUNCTURE: CPT

## 2020-01-23 PROCEDURE — 84450 TRANSFERASE (AST) (SGOT): CPT

## 2020-01-24 LAB — CD4 T-HELPER CELLS: NORMAL

## 2020-01-26 LAB
HIV INTERPRETATION: DETECTED
HIV-1 GENOTYPE: NORMAL
HIV-1 QNT LOG, IU/ML: < 1.47 LOG CPY/ML
HIV-1 QNT, IU/ML: ABNORMAL CPY/ML

## 2020-01-30 LAB — MISC. #1 REFERENCE GROUP TEST: NORMAL

## 2020-02-03 RX ORDER — RANITIDINE HCL 75 MG
75 TABLET ORAL 2 TIMES DAILY
Qty: 60 TABLET | Refills: 11 | Status: SHIPPED | OUTPATIENT
Start: 2020-02-03 | End: 2021-02-03 | Stop reason: SDUPTHER

## 2020-03-20 ENCOUNTER — TELEPHONE (OUTPATIENT)
Dept: FAMILY MEDICINE CLINIC | Age: 60
End: 2020-03-20

## 2020-03-20 RX ORDER — PREDNISONE 20 MG/1
20 TABLET ORAL 2 TIMES DAILY
Qty: 10 TABLET | Refills: 0 | Status: SHIPPED | OUTPATIENT
Start: 2020-03-20 | End: 2020-03-25

## 2020-03-20 RX ORDER — DOXYCYCLINE HYCLATE 100 MG
100 TABLET ORAL 2 TIMES DAILY
Qty: 14 TABLET | Refills: 0 | Status: SHIPPED | OUTPATIENT
Start: 2020-03-20 | End: 2020-03-27

## 2020-03-26 ENCOUNTER — TELEPHONE (OUTPATIENT)
Dept: FAMILY MEDICINE CLINIC | Age: 60
End: 2020-03-26

## 2020-03-26 NOTE — TELEPHONE ENCOUNTER
Patient is asking for an order for disposable wash clothes, small/medium over nite depends, bladder pads, Prevail pads for underwear,  Please send to [facility] Ph:  [phone][facility]

## 2020-03-26 NOTE — TELEPHONE ENCOUNTER
Orders written for requested supplies listed below per Popeye Lancaster at [address], signed by Kami Orellana CNP. Order faxed to [facility] at [phone]. Patient notified and voiced understanding.

## 2020-04-14 ENCOUNTER — TELEPHONE (OUTPATIENT)
Dept: FAMILY MEDICINE CLINIC | Age: 60
End: 2020-04-14

## 2020-04-14 RX ORDER — FLUCONAZOLE 150 MG/1
150 TABLET ORAL ONCE
Qty: 2 TABLET | Refills: 0 | Status: SHIPPED | OUTPATIENT
Start: 2020-04-14 | End: 2020-04-14

## 2020-04-20 ENCOUNTER — TELEPHONE (OUTPATIENT)
Dept: ADMINISTRATIVE | Age: 60
End: 2020-04-20

## 2020-04-20 NOTE — TELEPHONE ENCOUNTER
Pt called and said she has a lump in her back near her spine on her left side. She can physically feel it. It is uncomfortable, when she lays on her back that is how she noticed on Saturday. Please advise.

## 2020-05-19 ENCOUNTER — TELEPHONE (OUTPATIENT)
Dept: ADMINISTRATIVE | Age: 60
End: 2020-05-19

## 2020-05-19 NOTE — TELEPHONE ENCOUNTER
Power chair ramp orders are in nurse bin for provider review, sign and fax. What dx code for orders ? Please advise. Thanks !

## 2020-05-19 NOTE — TELEPHONE ENCOUNTER
Pt is calling for a prescription for a power chair ramp. Needs faxed to [facility] [address] [phone]. This is for them to send to [address] [place] [address].

## 2020-06-11 RX ORDER — ALBUTEROL SULFATE 90 UG/1
AEROSOL, METERED RESPIRATORY (INHALATION)
Qty: 8.5 G | Refills: 3 | Status: SHIPPED | OUTPATIENT
Start: 2020-06-11 | End: 2021-03-01 | Stop reason: SDUPTHER

## 2020-06-15 ENCOUNTER — HOSPITAL ENCOUNTER (OUTPATIENT)
Age: 60
Discharge: HOME | End: 2020-06-15
Payer: COMMERCIAL

## 2020-06-15 ENCOUNTER — HOSPITAL ENCOUNTER (OUTPATIENT)
Dept: CT IMAGING | Age: 60
Discharge: HOME | End: 2020-06-15
Payer: COMMERCIAL

## 2020-06-15 LAB
ALBUMIN SERPL-MCNC: 3.9 G/DL (ref 3.5–5.1)
ALP BLD-CCNC: 138 U/L (ref 38–126)
ALT SERPL-CCNC: 20 U/L (ref 11–66)
ANION GAP SERPL CALCULATED.3IONS-SCNC: 9 MEQ/L (ref 8–16)
AST SERPL-CCNC: 20 U/L (ref 5–40)
BASOPHILS # BLD: 1.2 %
BASOPHILS ABSOLUTE: 0.1 THOU/MM3 (ref 0–0.1)
BILIRUB SERPL-MCNC: 0.3 MG/DL (ref 0.3–1.2)
BILIRUBIN DIRECT: < 0.2 MG/DL (ref 0–0.3)
BUN BLDV-MCNC: 13 MG/DL (ref 7–22)
CALCIUM SERPL-MCNC: 9.4 MG/DL (ref 8.5–10.5)
CHLORIDE BLD-SCNC: 103 MEQ/L (ref 98–111)
CHOLESTEROL, TOTAL: 228 MG/DL (ref 100–199)
CO2: 27 MEQ/L (ref 23–33)
CREAT SERPL-MCNC: 0.8 MG/DL (ref 0.4–1.2)
EOSINOPHIL # BLD: 2 %
EOSINOPHILS ABSOLUTE: 0.2 THOU/MM3 (ref 0–0.4)
ERYTHROCYTE [DISTWIDTH] IN BLOOD BY AUTOMATED COUNT: 13.3 % (ref 11.5–14.5)
ERYTHROCYTE [DISTWIDTH] IN BLOOD BY AUTOMATED COUNT: 47.9 FL (ref 35–45)
GFR SERPL CREATININE-BSD FRML MDRD: 73 ML/MIN/1.73M2
GLUCOSE BLD-MCNC: 115 MG/DL (ref 70–108)
HCT VFR BLD CALC: 44 % (ref 37–47)
HDLC SERPL-MCNC: 55 MG/DL
HEMOGLOBIN: 13.9 GM/DL (ref 12–16)
IMMATURE GRANS (ABS): 0.04 THOU/MM3 (ref 0–0.07)
IMMATURE GRANULOCYTES: 0.5 %
LDL CHOLESTEROL CALCULATED: ABNORMAL MG/DL
LYMPHOCYTES # BLD: 24.8 %
LYMPHOCYTES ABSOLUTE: 1.9 THOU/MM3 (ref 1–4.8)
MCH RBC QN AUTO: 30.8 PG (ref 26–33)
MCHC RBC AUTO-ENTMCNC: 31.6 GM/DL (ref 32.2–35.5)
MCV RBC AUTO: 97.3 FL (ref 81–99)
MONOCYTES # BLD: 7.8 %
MONOCYTES ABSOLUTE: 0.6 THOU/MM3 (ref 0.4–1.3)
NUCLEATED RED BLOOD CELLS: 0 /100 WBC
PHOSPHORUS: 3.5 MG/DL (ref 2.4–4.7)
PLATELET # BLD: 280 THOU/MM3 (ref 130–400)
PMV BLD AUTO: 10.1 FL (ref 9.4–12.4)
POTASSIUM SERPL-SCNC: 3.8 MEQ/L (ref 3.5–5.2)
RBC # BLD: 4.52 MILL/MM3 (ref 4.2–5.4)
SEG NEUTROPHILS: 63.7 %
SEGMENTED NEUTROPHILS ABSOLUTE COUNT: 4.8 THOU/MM3 (ref 1.8–7.7)
SODIUM BLD-SCNC: 139 MEQ/L (ref 135–145)
TOTAL PROTEIN: 7.2 G/DL (ref 6.1–8)
TRIGL SERPL-MCNC: 407 MG/DL (ref 0–199)
WBC # BLD: 7.5 THOU/MM3 (ref 4.8–10.8)

## 2020-06-15 PROCEDURE — 86361 T CELL ABSOLUTE COUNT: CPT

## 2020-06-15 PROCEDURE — 36415 COLL VENOUS BLD VENIPUNCTURE: CPT

## 2020-06-15 PROCEDURE — 82248 BILIRUBIN DIRECT: CPT

## 2020-06-15 PROCEDURE — 84460 ALANINE AMINO (ALT) (SGPT): CPT

## 2020-06-15 PROCEDURE — 87522 HEPATITIS C REVRS TRNSCRPJ: CPT

## 2020-06-15 PROCEDURE — 84155 ASSAY OF PROTEIN SERUM: CPT

## 2020-06-15 PROCEDURE — 84450 TRANSFERASE (AST) (SGOT): CPT

## 2020-06-15 PROCEDURE — 85025 COMPLETE CBC W/AUTO DIFF WBC: CPT

## 2020-06-15 PROCEDURE — 84075 ASSAY ALKALINE PHOSPHATASE: CPT

## 2020-06-15 PROCEDURE — 80061 LIPID PANEL: CPT

## 2020-06-15 PROCEDURE — 82247 BILIRUBIN TOTAL: CPT

## 2020-06-15 PROCEDURE — 80069 RENAL FUNCTION PANEL: CPT

## 2020-06-15 PROCEDURE — G0297 LDCT FOR LUNG CA SCREEN: HCPCS

## 2020-06-18 LAB
HCV QNT BY NAAT INTERPRETATION: NOT DETECTED
HCV QNT BY NAAT IU/ML: NOT DETECTED IU/ML
HCV QNT BY NAAT LOG IU/ML: NOT DETECTED LOG IU/ML

## 2020-06-24 ENCOUNTER — OFFICE VISIT (OUTPATIENT)
Dept: PULMONOLOGY | Age: 60
End: 2020-06-24
Payer: COMMERCIAL

## 2020-06-24 VITALS
BODY MASS INDEX: 28.52 KG/M2 | TEMPERATURE: 97.9 F | WEIGHT: 171.2 LBS | DIASTOLIC BLOOD PRESSURE: 70 MMHG | RESPIRATION RATE: 16 BRPM | OXYGEN SATURATION: 93 % | HEIGHT: 65 IN | SYSTOLIC BLOOD PRESSURE: 102 MMHG | HEART RATE: 89 BPM

## 2020-06-24 PROCEDURE — G8417 CALC BMI ABV UP PARAM F/U: HCPCS | Performed by: NURSE PRACTITIONER

## 2020-06-24 PROCEDURE — 3023F SPIROM DOC REV: CPT | Performed by: NURSE PRACTITIONER

## 2020-06-24 PROCEDURE — G8926 SPIRO NO PERF OR DOC: HCPCS | Performed by: NURSE PRACTITIONER

## 2020-06-24 PROCEDURE — G8427 DOCREV CUR MEDS BY ELIG CLIN: HCPCS | Performed by: NURSE PRACTITIONER

## 2020-06-24 PROCEDURE — 3017F COLORECTAL CA SCREEN DOC REV: CPT | Performed by: NURSE PRACTITIONER

## 2020-06-24 PROCEDURE — 99214 OFFICE O/P EST MOD 30 MIN: CPT | Performed by: NURSE PRACTITIONER

## 2020-06-24 PROCEDURE — 1036F TOBACCO NON-USER: CPT | Performed by: NURSE PRACTITIONER

## 2020-06-24 RX ORDER — IPRATROPIUM BROMIDE AND ALBUTEROL SULFATE 2.5; .5 MG/3ML; MG/3ML
SOLUTION RESPIRATORY (INHALATION)
Qty: 360 ML | Refills: 11 | Status: SHIPPED | OUTPATIENT
Start: 2020-06-24 | End: 2021-03-01 | Stop reason: SDUPTHER

## 2020-06-24 RX ORDER — ACLIDINIUM BROMIDE 400 UG/1
1 POWDER, METERED RESPIRATORY (INHALATION) 2 TIMES DAILY
Qty: 60 EACH | Refills: 11 | Status: SHIPPED | OUTPATIENT
Start: 2020-06-24 | End: 2020-09-23 | Stop reason: SDUPTHER

## 2020-06-24 RX ORDER — GUAIFENESIN 600 MG/1
TABLET, EXTENDED RELEASE ORAL
Qty: 60 TABLET | Refills: 11 | Status: SHIPPED | OUTPATIENT
Start: 2020-06-24 | End: 2020-09-23 | Stop reason: SDUPTHER

## 2020-06-24 RX ORDER — FLUTICASONE PROPIONATE 50 MCG
2 SPRAY, SUSPENSION (ML) NASAL DAILY
Qty: 3 BOTTLE | Refills: 1 | Status: SHIPPED | OUTPATIENT
Start: 2020-06-24 | End: 2021-01-22

## 2020-06-24 RX ORDER — DOXYCYCLINE HYCLATE 100 MG/1
100 CAPSULE ORAL 2 TIMES DAILY
Qty: 14 CAPSULE | Refills: 0 | Status: SHIPPED | OUTPATIENT
Start: 2020-06-24 | End: 2020-07-01

## 2020-06-24 ASSESSMENT — ENCOUNTER SYMPTOMS
NAUSEA: 0
VOMITING: 0
CHEST TIGHTNESS: 1
WHEEZING: 0
DIARRHEA: 0
EYES NEGATIVE: 1
ABDOMINAL PAIN: 0
SHORTNESS OF BREATH: 1
COUGH: 1
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
## 2020-07-13 RX ORDER — MIRTAZAPINE 30 MG/1
TABLET, FILM COATED ORAL
Qty: 30 TABLET | Refills: 11 | Status: SHIPPED | OUTPATIENT
Start: 2020-07-13 | End: 2021-06-14

## 2020-09-03 ENCOUNTER — TELEPHONE (OUTPATIENT)
Dept: FAMILY MEDICINE CLINIC | Age: 60
End: 2020-09-03

## 2020-09-03 ENCOUNTER — NURSE TRIAGE (OUTPATIENT)
Dept: OTHER | Facility: CLINIC | Age: 60
End: 2020-09-03

## 2020-09-03 NOTE — TELEPHONE ENCOUNTER
Received call from Sina Tabor, [address], [place]. Patient called in c/o with multiple issues; cough and shortness of breath, COPD and emphysema flare, states symptoms are ongoing, patient requesting an rx for prednisone and a sinus infection. Patient also noted rectal bleeding, onset 6 months, states intermittent blood in stools, with bright red blood in the toilet a week ago, and intermittent abdominal pain 10/10, see triage assessment below. Care Advice provided; patient acknowledged understanding of care advice and is in agreement with plan., with modifications, states she can't come in today, can come in tomorrow. Call soft transferred to [facility] to schedule appointment. Please do not reply to the triage nurse through this encounter. Any subsequent communication should be directly with the patient. Reason for Disposition   Wheezing is present    Answer Assessment - Initial Assessment Questions  1. ONSET: \"When did the cough begin? \"       Chronic  2. SEVERITY: \"How bad is the cough today? \"       Severe  3. RESPIRATORY DISTRESS: \"Describe your breathing. \"       Wheezing  4. FEVER: \"Do you have a fever? \" If so, ask: \"What is your temperature, how was it measured, and when did it start? \"      Denies  5. SPUTUM: \"Describe the color of your sputum\" (clear, white, yellow, green)      Brown/yellow foamy sputum, large amounts  6. HEMOPTYSIS: \"Are you coughing up any blood? \" If so ask: \"How much? \" (flecks, streaks, tablespoons, etc.)      Denies  7. CARDIAC HISTORY: \"Do you have any history of heart disease? \" (e.g., heart attack, congestive heart failure)       Denies  8. LUNG HISTORY: \"Do you have any history of lung disease? \"  (e.g., pulmonary embolus, asthma, emphysema)      Stage 3 emphysema, COPD  9. PE RISK FACTORS: \"Do you have a history of blood clots? \" (or: recent major surgery, recent prolonged travel, bedridden)      Denies  10.  OTHER SYMPTOMS: \"Do you have any other symptoms? \" (e.g., runny nose, wheezing, chest pain)        Wheezing, runny nose, sinus congestion, intermittent SOB, worse with exertion, intermittent abdominal pain 10/10, intermittent rectal bleeding  11. PREGNANCY: \"Is there any chance you are pregnant? \" \"When was your last menstrual period? \"       NA  12. TRAVEL: \"Have you traveled out of the country in the last month? \" (e.g., travel history, exposures)        Denies    Protocols used: COUGH - ACUTE PRODUCTIVE-ADULT-AH

## 2020-09-03 NOTE — TELEPHONE ENCOUNTER
Pt called office left vm stating we should be receiving a fax from [employer] with Medicaid. It is to have [employer] Whe[employer] go out to her home 3x a week. It will need your signature.

## 2020-09-04 ENCOUNTER — VIRTUAL VISIT (OUTPATIENT)
Dept: FAMILY MEDICINE CLINIC | Age: 60
End: 2020-09-04
Payer: MEDICAID

## 2020-09-04 PROCEDURE — 99214 OFFICE O/P EST MOD 30 MIN: CPT | Performed by: FAMILY MEDICINE

## 2020-09-04 RX ORDER — PREDNISONE 20 MG/1
20 TABLET ORAL 2 TIMES DAILY
Qty: 10 TABLET | Refills: 0 | Status: SHIPPED | OUTPATIENT
Start: 2020-09-04 | End: 2020-09-09

## 2020-09-04 RX ORDER — DOXYCYCLINE HYCLATE 100 MG
100 TABLET ORAL 2 TIMES DAILY
Qty: 14 TABLET | Refills: 0 | Status: SHIPPED | OUTPATIENT
Start: 2020-09-04 | End: 2020-09-11

## 2020-09-04 ASSESSMENT — ENCOUNTER SYMPTOMS
SINUS PRESSURE: 1
WHEEZING: 1
SHORTNESS OF BREATH: 1
COUGH: 1
CHEST TIGHTNESS: 1
GASTROINTESTINAL NEGATIVE: 1
SINUS PAIN: 1

## 2020-09-04 NOTE — PROGRESS NOTES
Subjective:      Patient ID: Zack Holbrook is a 61 y.o. female. HPI:    Chief Complaint   Patient presents with    COPD     Zack Holbrook (:  1960) has requested an audio/video evaluation for the following concern(s):    Pt presents for a VV today with concern about her sinuses and COPD. Pt c/o sinus pain and pressure for the last 3 weeks. Has noticed increased mucus production and cough over this same time frame. She is compliant with her inhalers. No fevers. No recent travels or COVID exposure. Pt continues to follow closely with her specialists. No recent med changes. Chronic conditions otherwise stable per pt.     Patient Active Problem List   Diagnosis    Asthma    Hyperlipemia    Chronic pain syndrome    Bipolar disease, chronic (HCC)    Schizophrenia (HCC)    Tobacco abuse    PTSD (post-traumatic stress disorder)    Environmental allergies    Asthma with COPD ([address]    Chronic sinusitis    Vaginal dysplasia    COPD (chronic obstructive pulmonary disease) ([address]    Dizziness and giddiness    HIV (human immunodeficiency virus infection) ([address]    Vomiting    Acid reflux    Weight loss    Chronic cholecystitis with calculus    COPD with acute exacerbation (HCC)    Chest pain on breathing    Immunocompromised state ([address]    Acute exacerbation of chronic obstructive pulmonary disease (COPD) ([address]    Acute on chronic respiratory failure with hypoxia and hypercapnia (HCC)    Angina, class II (HCC)    Abnormal stress test    Left facial numbness    Memory deficits    Obstructive sleep apnea    Tobacco dependence    Anxiety and depression    History of CVA (cerebrovascular accident)    Hypotension    Closed fracture of bone of right foot    Left ankle swelling    Left arm weakness    Condyloma acuminatum    LGSIL Pap smear of vagina    MI (myocardial infarction) ([address]    Osteopenia    Pure hypercholesterolemia    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)     Past Surgical History:   Procedure Laterality Date    CHOLECYSTECTOMY, LAPAROSCOPIC  5/5/16    Wisser    COLONOSCOPY  2014    ENDOSCOPY, COLON, DIAGNOSTIC      FOOT SURGERY  unsure    HERNIA REPAIR  unsure    HYSTERECTOMY  98    JOINT REPLACEMENT  unsure    Right knee    UPPER GASTROINTESTINAL ENDOSCOPY  2014     Prior to Admission medications    Medication Sig Start Date End Date Taking?  Authorizing Provider   mirtazapine (REMERON) 30 MG tablet take 1/2-1 tablet by mouth NIGHTLY if needed 7/13/20   Radha Nguyen DO   ipratropium-albuterol (DUONEB) 0.5-2.5 (3) MG/3ML SOLN nebulizer solution inhale contents of 1 vial in nebulizer four times a day INTO THE LUNGS 6/24/20   JAMEY Barboza CNP   VENTOLIN  (90 Base) MCG/ACT inhaler Inhale 2 puffs into the lungs every 6 hours as needed for Wheezing or Shortness of Breath 6/24/20 6/24/21  Imtiaz JAMEY Schulz CNP   guaiFENesin (MUCINEX) 600 MG extended release tablet take 1 tablet by mouth twice a day 6/24/20   Imtiaz JAMEY Schulz CNP   fluticasone (FLONASE) 50 MCG/ACT nasal spray 2 sprays by Each Nostril route daily 6/24/20   JAMEY Coello CNP   aclidinium (TUDORZA PRESSAIR) 400 MCG/ACT AEPB inhaler Inhale 1 puff into the lungs 2 times daily 6/24/20 6/24/21  JAMEY Coello CNP   albuterol sulfate  (90 Base) MCG/ACT inhaler inhale 2 puffs by mouth every 4 hours if needed for wheezing 6/11/20   Radha Nguyen DO   mirabegron [facility]) 25 MG TB24 take 1 tablet by mouth once daily 4/6/20   Piedad Fabry, APRN - CNP   oxybutynin (DITROPAN XL) 5 MG extended release tablet Take 1 tablet by mouth daily 4/6/20 4/6/21  Piedad Fabry, APRN - CNP   ranitidine (ZANTAC 75) 75 MG tablet Take 1 tablet by mouth 2 times daily 2/3/20   Radha Nguyen DO   doxepin [facility]) 50 MG capsule  6/25/19   Historical Provider, MD   ibuprofen (ADVIL;MOTRIN) 600 MG tablet Take 1 tablet by mouth 3 times daily as needed for Pain 7/2/19   Senait Silva MD   buPROPion (WELLBUTRIN XL) 150 MG extended release tablet Take 150 mg by mouth every morning    Historical Provider, MD   cetirizine-psuedoephedrine (ZYRTEC-D) 5-120 MG per extended release tablet take 1 tablet by mouth twice a day  Patient not taking: Reported on 6/24/2020 6/25/19   Gwendolynn Found, DO   Blood Pressure Monitoring (BLOOD PRESSURE CUFF) MISC Automated blood pressure cuff. Dx: hypotension 3/20/19   Gwendolynn Found, DO   Multiple Vitamin (MULTI VITAMIN DAILY PO) Take by mouth    Historical Provider, MD   OXYGEN Inhale 2 L into the lungs nightly    Historical Provider, MD         Review of Systems   Constitutional: Negative. Negative for fever. HENT: Positive for congestion, sinus pressure and sinus pain. Respiratory: Positive for cough, chest tightness, shortness of breath and wheezing. Cardiovascular: Negative. Gastrointestinal: Negative. Musculoskeletal: Negative. All other systems reviewed and are negative. Objective:   Physical Exam  Vitals signs and nursing note reviewed. Constitutional:       Appearance: She is well-developed. HENT:      Head: Normocephalic and atraumatic. Right Ear: Tympanic membrane normal.      Left Ear: Tympanic membrane normal.   Cardiovascular:      Rate and Rhythm: Normal rate and regular rhythm. Heart sounds: Normal heart sounds. No murmur. Pulmonary:      Effort: Pulmonary effort is normal.      Breath sounds: Normal breath sounds. Abdominal:      General: Bowel sounds are normal.      Palpations: Abdomen is soft. Skin:     General: Skin is warm and dry. Neurological:      Mental Status: She is alert and oriented to person, place, and time. Psychiatric:         Behavior: Behavior normal.         Assessment:       Diagnosis Orders   1. COPD exacerbation (HCC)  doxycycline hyclate (VIBRA-TABS) 100 MG tablet    predniSONE (DELTASONE) 20 MG tablet   2.  Immunocompromised state ([facility]     3. Schizophrenia, unspecified type ([facility]     4. HIV positive ([facility]     5. Angina, class II ([facility]             Plan:      -  Chronic medical problems stable  -  Continue current medications  -  Follow up with specialists as scheduled  -  Acute orders above  -  RTO prn    Due to this being a TeleHealth encounter (During BOB-31 public health emergency), evaluation of the following organ systems was limited: Vitals/Constitutional/EENT/Resp/CV/GI//MS/Neuro/Skin/Heme-Lymph-Imm. Pursuant to the emergency declaration under the [address] [place] [address] authority and the [employer] Act, this Virtual Visit was conducted with patient's (and/or legal guardian's) consent, to reduce the patient's risk of exposure to COVID-19 and provide necessary medical care. The patient (and/or legal guardian) has also been advised to contact this office for worsening conditions or problems, and seek emergency medical treatment and/or call 911 if deemed necessary. Patient identification was verified at the start of the visit: Yes    Total time spent for this encounter: Not billed by time    Services were provided through a video synchronous discussion virtually to substitute for in-person clinic visit. Patient and provider were located at their individual homes.           Parul Mares DO

## 2020-09-14 ENCOUNTER — OFFICE VISIT (OUTPATIENT)
Dept: FAMILY MEDICINE CLINIC | Age: 60
End: 2020-09-14
Payer: MEDICAID

## 2020-09-14 ENCOUNTER — NURSE TRIAGE (OUTPATIENT)
Dept: OTHER | Facility: CLINIC | Age: 60
End: 2020-09-14

## 2020-09-14 VITALS
WEIGHT: 180.2 LBS | OXYGEN SATURATION: 96 % | TEMPERATURE: 97.6 F | BODY MASS INDEX: 30.02 KG/M2 | HEIGHT: 65 IN | HEART RATE: 90 BPM | DIASTOLIC BLOOD PRESSURE: 78 MMHG | SYSTOLIC BLOOD PRESSURE: 120 MMHG | RESPIRATION RATE: 14 BRPM

## 2020-09-14 PROBLEM — J96.21 ACUTE ON CHRONIC RESPIRATORY FAILURE WITH HYPOXIA AND HYPERCAPNIA (HCC): Status: RESOLVED | Noted: 2017-06-14 | Resolved: 2020-09-14

## 2020-09-14 PROBLEM — J96.22 ACUTE ON CHRONIC RESPIRATORY FAILURE WITH HYPOXIA AND HYPERCAPNIA (HCC): Status: RESOLVED | Noted: 2017-06-14 | Resolved: 2020-09-14

## 2020-09-14 PROCEDURE — G8427 DOCREV CUR MEDS BY ELIG CLIN: HCPCS | Performed by: NURSE PRACTITIONER

## 2020-09-14 PROCEDURE — 1036F TOBACCO NON-USER: CPT | Performed by: NURSE PRACTITIONER

## 2020-09-14 PROCEDURE — 99213 OFFICE O/P EST LOW 20 MIN: CPT | Performed by: NURSE PRACTITIONER

## 2020-09-14 PROCEDURE — 3017F COLORECTAL CA SCREEN DOC REV: CPT | Performed by: NURSE PRACTITIONER

## 2020-09-14 PROCEDURE — G8417 CALC BMI ABV UP PARAM F/U: HCPCS | Performed by: NURSE PRACTITIONER

## 2020-09-14 RX ORDER — GABAPENTIN 300 MG/1
CAPSULE ORAL
Qty: 90 CAPSULE | Refills: 1 | Status: SHIPPED | OUTPATIENT
Start: 2020-09-14 | End: 2020-11-04
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
## 2020-09-16 PROBLEM — R20.0 LEFT FACIAL NUMBNESS: Status: RESOLVED | Noted: 2018-11-14 | Resolved: 2020-09-16

## 2020-09-16 PROBLEM — M25.472 LEFT ANKLE SWELLING: Status: RESOLVED | Noted: 2019-06-30 | Resolved: 2020-09-16

## 2020-09-16 PROBLEM — R29.898 LEFT ARM WEAKNESS: Status: RESOLVED | Noted: 2019-06-30 | Resolved: 2020-09-16

## 2020-09-16 PROBLEM — R91.1 PULMONARY NODULE, RIGHT: Status: ACTIVE | Noted: 2020-09-16

## 2020-09-16 PROBLEM — M47.816 LUMBAR SPONDYLOSIS: Status: ACTIVE | Noted: 2020-09-16

## 2020-09-16 PROBLEM — S92.901A CLOSED FRACTURE OF BONE OF RIGHT FOOT: Status: RESOLVED | Noted: 2019-06-30 | Resolved: 2020-09-16

## 2020-09-16 PROBLEM — I95.9 HYPOTENSION: Status: RESOLVED | Noted: 2019-06-30 | Resolved: 2020-09-16

## 2020-09-22 ENCOUNTER — HOSPITAL ENCOUNTER (OUTPATIENT)
Dept: PHYSICAL THERAPY | Age: 60
Setting detail: THERAPIES SERIES
Discharge: HOME | End: 2020-09-22
Payer: MEDICAID

## 2020-09-22 PROCEDURE — 97161 PT EVAL LOW COMPLEX 20 MIN: CPT

## 2020-09-22 PROCEDURE — G0283 ELEC STIM OTHER THAN WOUND: HCPCS

## 2020-09-22 PROCEDURE — 97110 THERAPEUTIC EXERCISES: CPT

## 2020-09-23 ENCOUNTER — TELEPHONE (OUTPATIENT)
Dept: PULMONOLOGY | Age: 60
End: 2020-09-23

## 2020-09-23 RX ORDER — GUAIFENESIN 600 MG/1
TABLET, EXTENDED RELEASE ORAL
Qty: 60 TABLET | Refills: 11 | Status: SHIPPED | OUTPATIENT
Start: 2020-09-23 | End: 2020-10-15 | Stop reason: SDUPTHER

## 2020-09-23 RX ORDER — ACLIDINIUM BROMIDE 400 UG/1
1 POWDER, METERED RESPIRATORY (INHALATION) 2 TIMES DAILY
Qty: 60 EACH | Refills: 11 | Status: SHIPPED | OUTPATIENT
Start: 2020-09-23 | End: 2021-03-10 | Stop reason: HOSPADM

## 2020-09-23 NOTE — TELEPHONE ENCOUNTER
Patient wanted to know if you could send in some medication for cold and flu , she has a cough and congestion

## 2020-09-23 NOTE — TELEPHONE ENCOUNTER
Patient called in stating she needs refills on her  Mucinex, Tudorza, and ventolin she uses [facility] [place] [address] Dr Aleksandra CHRISTOPHER [phone]    Also she states she needs a RX for o2 hosing and supplies.  Please advise

## 2020-09-29 ENCOUNTER — TELEPHONE (OUTPATIENT)
Dept: PULMONOLOGY | Age: 60
End: 2020-09-29

## 2020-09-29 ENCOUNTER — APPOINTMENT (OUTPATIENT)
Dept: PHYSICAL THERAPY | Age: 60
End: 2020-09-29
Payer: MEDICAID

## 2020-09-29 NOTE — TELEPHONE ENCOUNTER
Insurance denied Ventolin but they will pay for generic. I called [facility] and they state that you sent rx marked CHRISTINA.

## 2020-09-30 NOTE — TELEPHONE ENCOUNTER
I typically do CHRISTINA if patient is requesting brand name only, can you reach out to the patient and let her know insurance not covering Ventolin, see if she is wanting to pay OOP cost or if she wants me to send generic

## 2020-10-01 RX ORDER — ALBUTEROL SULFATE 90 UG/1
2 AEROSOL, METERED RESPIRATORY (INHALATION) EVERY 6 HOURS PRN
Qty: 1 INHALER | Refills: 11 | Status: SHIPPED | OUTPATIENT
Start: 2020-10-01 | End: 2021-01-06 | Stop reason: SDUPTHER

## 2020-10-13 ENCOUNTER — TELEPHONE (OUTPATIENT)
Dept: FAMILY MEDICINE CLINIC | Age: 60
End: 2020-10-13

## 2020-10-13 RX ORDER — DIAPER,BRIEF,ADULT, DISPOSABLE
EACH MISCELLANEOUS
Qty: 3 PACKAGE | Refills: 3 | Status: SHIPPED | OUTPATIENT
Start: 2020-10-13 | End: 2021-03-09

## 2020-10-13 RX ORDER — INCONTINENCE PAD,LINER,DISP
EACH MISCELLANEOUS
Qty: 3 PACKAGE | Refills: 3 | Status: SHIPPED | OUTPATIENT
Start: 2020-10-13 | End: 2021-03-09

## 2020-10-13 NOTE — TELEPHONE ENCOUNTER
Patient calling to re order incontinence supplies. Patient states she needs size Medium Depends. She is completely out of incontinence supplies. [place].  This company  takes [employer] and she would like to use this company  [phone]

## 2020-10-13 NOTE — TELEPHONE ENCOUNTER
Inderjit Model called requesting a refill on the following medications:  Requested Prescriptions     Pending Prescriptions Disp Refills    guaiFENesin (MUCINEX) 600 MG extended release tablet 60 tablet 11     Sig: take 1 tablet by mouth twice a day     Pharmacy verified:  [facility]      Date of last visit:  9/14/2020  Date of next visit (if applicable): 02/22/7225

## 2020-10-13 NOTE — TELEPHONE ENCOUNTER
Patient calling wanting to re order incontinence supplies. She states she need 3-4 boxes of Depends. Patient was not sure where she gets the order of incontinence supplies.

## 2020-10-15 RX ORDER — GUAIFENESIN 600 MG/1
TABLET, EXTENDED RELEASE ORAL
Qty: 60 TABLET | Refills: 11 | Status: SHIPPED | OUTPATIENT
Start: 2020-10-15 | End: 2020-12-22 | Stop reason: SDUPTHER

## 2020-10-22 NOTE — DISCHARGE SUMMARY
[address] [facility]    Patient Name: Myrna Tolliver        CSN: [id number]   YOB: 1960  Gender: female  JAMEY Maravilla -*,    Lumbago with sciatica, left side [M54.42]  Lumbago with sciatica, right side [M54.41]  Other chronic pain [G89.29]  Radiculopathy, lumbar region [M54.16] ,      Patient is discharged from Physical Therapy services at this time. See last note for details related to results of therapy and goal achievement. Reason for discharge: Attendance. Patient has cancelled/no-showed numerous visits and will be d/c this date secondary to attendance policy. Left VM with patient.

## 2020-10-23 ENCOUNTER — TELEPHONE (OUTPATIENT)
Dept: FAMILY MEDICINE CLINIC | Age: 60
End: 2020-10-23

## 2020-10-23 NOTE — TELEPHONE ENCOUNTER
Patient calling to reschedule appointment and to let Miguel Passey that she has to discontinue Physical Therapy in the pool because she has to have a colonoscopy for rectal bleeding.

## 2020-10-27 ENCOUNTER — HOSPITAL ENCOUNTER (OUTPATIENT)
Dept: PHYSICAL THERAPY | Age: 60
Setting detail: THERAPIES SERIES
Discharge: HOME | End: 2020-10-27
Payer: MEDICAID

## 2020-11-10 ENCOUNTER — TELEPHONE (OUTPATIENT)
Dept: FAMILY MEDICINE CLINIC | Age: 60
End: 2020-11-10

## 2020-11-10 ENCOUNTER — OFFICE VISIT (OUTPATIENT)
Dept: FAMILY MEDICINE CLINIC | Age: 60
End: 2020-11-10
Payer: MEDICAID

## 2020-11-10 VITALS
WEIGHT: 180 LBS | HEART RATE: 71 BPM | DIASTOLIC BLOOD PRESSURE: 64 MMHG | OXYGEN SATURATION: 91 % | BODY MASS INDEX: 30.73 KG/M2 | TEMPERATURE: 98.2 F | SYSTOLIC BLOOD PRESSURE: 110 MMHG | HEIGHT: 64 IN | RESPIRATION RATE: 16 BRPM

## 2020-11-10 PROBLEM — M54.42 CHRONIC BILATERAL LOW BACK PAIN WITH BILATERAL SCIATICA: Status: ACTIVE | Noted: 2020-11-10

## 2020-11-10 PROBLEM — M54.41 CHRONIC BILATERAL LOW BACK PAIN WITH BILATERAL SCIATICA: Status: ACTIVE | Noted: 2020-11-10

## 2020-11-10 PROBLEM — G89.29 CHRONIC BILATERAL LOW BACK PAIN WITH BILATERAL SCIATICA: Status: ACTIVE | Noted: 2020-11-10

## 2020-11-10 PROBLEM — M54.16 CHRONIC LUMBAR RADICULOPATHY: Status: ACTIVE | Noted: 2020-11-10

## 2020-11-10 PROBLEM — M48.061 SPINAL STENOSIS OF LUMBAR REGION WITHOUT NEUROGENIC CLAUDICATION: Status: ACTIVE | Noted: 2020-11-10

## 2020-11-10 PROCEDURE — 90472 IMMUNIZATION ADMIN EACH ADD: CPT | Performed by: NURSE PRACTITIONER

## 2020-11-10 PROCEDURE — 90732 PPSV23 VACC 2 YRS+ SUBQ/IM: CPT | Performed by: NURSE PRACTITIONER

## 2020-11-10 PROCEDURE — 90471 IMMUNIZATION ADMIN: CPT | Performed by: NURSE PRACTITIONER

## 2020-11-10 PROCEDURE — 99213 OFFICE O/P EST LOW 20 MIN: CPT | Performed by: NURSE PRACTITIONER

## 2020-11-10 PROCEDURE — 90686 IIV4 VACC NO PRSV 0.5 ML IM: CPT | Performed by: NURSE PRACTITIONER

## 2020-11-10 NOTE — PROGRESS NOTES
Immunization(s) given during visit:    Immunizations Administered     Name Date Dose Route    Influenza, Quadv, IM, PF (6 mo and older Fluzone, Flulaval, Fluarix, and 3 yrs and older Afluria) 11/10/2020 0.5 mL Intramuscular    Site: Deltoid- Right    Lot: [id number]    NDC: 86090-453-05    Pneumococcal Polysaccharide ([id number]) 11/10/2020 0.5 mL Intramuscular    Site: Deltoid- Left    Lot: [id number]    NDC: 6967-4387-33          Most recent Vaccine Information Sheet dated 8/15/19 and 10/30/19 given to pt
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
## 2020-11-10 NOTE — TELEPHONE ENCOUNTER
Pt scheduled for MRI lumbar spine wo contrast at [facility] on 11/24/20 at 2:00, pt to be in main radiology at 1:30. Pt informed.

## 2020-11-10 NOTE — PROGRESS NOTES
Vaccine Information Sheet, \"Influenza - Inactivated\"  given to Neil Jj, or parent/legal guardian of  Neil Jj and verbalized understanding. Patient responses:    Have you ever had a reaction to a flu vaccine? No  Do you have an allergy to eggs, neomycin or polymixin? No  Do you have an allergy to Thimerosal, contact lens solution, or Merthiolate? No  Have you ever had [place] Syndrome? No  Do you have any current illness? No  Do you have a temperature above 100 degrees? No  Are you pregnant? No  Do you have an active neurological disorder? No      Flu vaccine given per order. Please see immunization tab.

## 2020-11-18 ENCOUNTER — HOSPITAL ENCOUNTER (OUTPATIENT)
Dept: GENERAL RADIOLOGY | Age: 60
Discharge: HOME | End: 2020-11-18
Payer: MEDICAID

## 2020-11-18 ENCOUNTER — HOSPITAL ENCOUNTER (OUTPATIENT)
Age: 60
Discharge: HOME | End: 2020-11-18
Payer: MEDICAID

## 2020-11-18 LAB
ANION GAP SERPL CALCULATED.3IONS-SCNC: 13 MEQ/L (ref 8–16)
BASOPHILS # BLD: 1.3 %
BASOPHILS ABSOLUTE: 0.1 THOU/MM3 (ref 0–0.1)
BUN BLDV-MCNC: 15 MG/DL (ref 7–22)
CALCIUM SERPL-MCNC: 9.9 MG/DL (ref 8.5–10.5)
CHLORIDE BLD-SCNC: 98 MEQ/L (ref 98–111)
CO2: 26 MEQ/L (ref 23–33)
CREAT SERPL-MCNC: 0.9 MG/DL (ref 0.4–1.2)
EOSINOPHIL # BLD: 0.9 %
EOSINOPHILS ABSOLUTE: 0.1 THOU/MM3 (ref 0–0.4)
ERYTHROCYTE [DISTWIDTH] IN BLOOD BY AUTOMATED COUNT: 13.3 % (ref 11.5–14.5)
ERYTHROCYTE [DISTWIDTH] IN BLOOD BY AUTOMATED COUNT: 46.8 FL (ref 35–45)
GFR SERPL CREATININE-BSD FRML MDRD: 64 ML/MIN/1.73M2
GLUCOSE BLD-MCNC: 117 MG/DL (ref 70–108)
HCT VFR BLD CALC: 44.9 % (ref 37–47)
HEMOGLOBIN: 15 GM/DL (ref 12–16)
IGA: 133 MG/DL (ref 70–400)
IMMATURE GRANS (ABS): 0.03 THOU/MM3 (ref 0–0.07)
IMMATURE GRANULOCYTES: 0.4 %
LYMPHOCYTES # BLD: 27 %
LYMPHOCYTES ABSOLUTE: 2.1 THOU/MM3 (ref 1–4.8)
MCH RBC QN AUTO: 31.8 PG (ref 26–33)
MCHC RBC AUTO-ENTMCNC: 33.4 GM/DL (ref 32.2–35.5)
MCV RBC AUTO: 95.1 FL (ref 81–99)
MONOCYTES # BLD: 5.8 %
MONOCYTES ABSOLUTE: 0.5 THOU/MM3 (ref 0.4–1.3)
NUCLEATED RED BLOOD CELLS: 0 /100 WBC
PLATELET # BLD: 278 THOU/MM3 (ref 130–400)
PMV BLD AUTO: 9.9 FL (ref 9.4–12.4)
POTASSIUM SERPL-SCNC: 4 MEQ/L (ref 3.5–5.2)
RBC # BLD: 4.72 MILL/MM3 (ref 4.2–5.4)
SEG NEUTROPHILS: 64.6 %
SEGMENTED NEUTROPHILS ABSOLUTE COUNT: 5.1 THOU/MM3 (ref 1.8–7.7)
SODIUM BLD-SCNC: 137 MEQ/L (ref 135–145)
TSH SERPL DL<=0.05 MIU/L-ACNC: 0.62 UIU/ML (ref 0.4–4.2)
WBC # BLD: 7.9 THOU/MM3 (ref 4.8–10.8)

## 2020-11-18 PROCEDURE — 36415 COLL VENOUS BLD VENIPUNCTURE: CPT

## 2020-11-18 PROCEDURE — 82784 ASSAY IGA/IGD/IGG/IGM EACH: CPT

## 2020-11-18 PROCEDURE — 74018 RADEX ABDOMEN 1 VIEW: CPT

## 2020-11-18 PROCEDURE — 84443 ASSAY THYROID STIM HORMONE: CPT

## 2020-11-18 PROCEDURE — 85025 COMPLETE CBC W/AUTO DIFF WBC: CPT

## 2020-11-18 PROCEDURE — 83516 IMMUNOASSAY NONANTIBODY: CPT

## 2020-11-18 PROCEDURE — 86793 YERSINIA ANTIBODY: CPT

## 2020-11-18 PROCEDURE — 80048 BASIC METABOLIC PNL TOTAL CA: CPT

## 2020-11-19 ENCOUNTER — TELEPHONE (OUTPATIENT)
Dept: UROLOGY | Age: 60
End: 2020-11-19

## 2020-11-19 LAB
TISSUE TRANSGLUTAMINASE IGA: 0.2 U/ML
TTG, IGG: < 0.6 U/ML

## 2020-11-22 ENCOUNTER — HOSPITAL ENCOUNTER (OUTPATIENT)
Age: 60
Setting detail: SPECIMEN
Discharge: HOME | End: 2020-11-22
Payer: MEDICAID

## 2020-11-22 PROCEDURE — 87427 SHIGA-LIKE TOXIN AG IA: CPT

## 2020-11-22 PROCEDURE — 87328 CRYPTOSPORIDIUM AG IA: CPT

## 2020-11-22 PROCEDURE — 87493 C DIFF AMPLIFIED PROBE: CPT

## 2020-11-22 PROCEDURE — 89055 LEUKOCYTE ASSESSMENT FECAL: CPT

## 2020-11-22 PROCEDURE — 87045 FECES CULTURE AEROBIC BACT: CPT

## 2020-11-22 PROCEDURE — 82705 FATS/LIPIDS FECES QUAL: CPT

## 2020-11-22 PROCEDURE — 87177 OVA AND PARASITES SMEARS: CPT

## 2020-11-22 PROCEDURE — 87337 ENTAMOEB HIST GROUP AG IA: CPT

## 2020-11-24 ENCOUNTER — HOSPITAL ENCOUNTER (OUTPATIENT)
Dept: CT IMAGING | Age: 60
Discharge: HOME | End: 2020-11-24
Payer: MEDICAID

## 2020-11-24 ENCOUNTER — TELEPHONE (OUTPATIENT)
Dept: FAMILY MEDICINE CLINIC | Age: 60
End: 2020-11-24

## 2020-11-24 ENCOUNTER — HOSPITAL ENCOUNTER (OUTPATIENT)
Dept: MRI IMAGING | Age: 60
Discharge: HOME | End: 2020-11-24
Payer: MEDICAID

## 2020-11-24 PROCEDURE — 72148 MRI LUMBAR SPINE W/O DYE: CPT

## 2020-11-24 PROCEDURE — 71250 CT THORAX DX C-: CPT

## 2020-11-24 NOTE — TELEPHONE ENCOUNTER
----- Message from JAMEY Sanchez - CNP sent at 11/24/2020  3:36 PM EST -----  Let Veronica Gu know her MRI of her lumbar spine shows she has a bulging disc at L5-S1, she also has lumbar spinal stenosis and narrowing and degenerative changes of her lumbar spine. I am first going to refer her to ortho to see if they would be able to do anything for her back pain from a surgical perspective. Referral placed.

## 2020-11-25 LAB
CLOSTRIDIUM DIFFICILE, PCR: NEGATIVE
FECAL LEUKOCYTES: NORMAL
YERSINIA IGG BY IMMUNOBLOT: NEGATIVE
YERSINIA IGM BY IMMUNOBLOT: NEGATIVE

## 2020-11-27 LAB — OVA AND PARASITES: NORMAL

## 2020-11-28 LAB
CULTURE, STOOL: NORMAL
FECAL FAT, QUALITATIVE: NORMAL

## 2020-11-29 LAB
CRYPTOCOCCUS AG SOURCE: NEGATIVE
ENTAMOEBA HISTOLYTICA ANTIGEN: NEGATIVE

## 2020-11-30 ENCOUNTER — TELEPHONE (OUTPATIENT)
Dept: PULMONOLOGY | Age: 60
End: 2020-11-30

## 2020-11-30 NOTE — TELEPHONE ENCOUNTER
----- Message from JAMEY Downey CNP sent at 11/30/2020 10:05 AM EST -----  This patient's CT is abnormal and she does not have any f/u scheduled. Needs one ASAP.

## 2020-12-01 ENCOUNTER — OFFICE VISIT (OUTPATIENT)
Dept: PULMONOLOGY | Age: 60
End: 2020-12-01
Payer: MEDICAID

## 2020-12-01 VITALS
TEMPERATURE: 97.5 F | OXYGEN SATURATION: 96 % | BODY MASS INDEX: 30.19 KG/M2 | HEIGHT: 65 IN | SYSTOLIC BLOOD PRESSURE: 118 MMHG | HEART RATE: 115 BPM | DIASTOLIC BLOOD PRESSURE: 70 MMHG | WEIGHT: 181.2 LBS

## 2020-12-01 PROCEDURE — 99215 OFFICE O/P EST HI 40 MIN: CPT | Performed by: NURSE PRACTITIONER

## 2020-12-01 RX ORDER — PROMETHAZINE HYDROCHLORIDE AND CODEINE PHOSPHATE 6.25; 1 MG/5ML; MG/5ML
5 SYRUP ORAL 4 TIMES DAILY PRN
Qty: 140 ML | Refills: 0 | Status: SHIPPED | OUTPATIENT
Start: 2020-12-01 | End: 2020-12-08

## 2020-12-01 RX ORDER — CEFDINIR 300 MG/1
300 CAPSULE ORAL 2 TIMES DAILY
Qty: 10 CAPSULE | Refills: 0 | Status: SHIPPED | OUTPATIENT
Start: 2020-12-01 | End: 2020-12-06

## 2020-12-01 ASSESSMENT — ENCOUNTER SYMPTOMS
WHEEZING: 0
VOMITING: 0
SHORTNESS OF BREATH: 1
EYES NEGATIVE: 1
DIARRHEA: 1
ABDOMINAL DISTENTION: 1
NAUSEA: 0
ABDOMINAL PAIN: 1
CHEST TIGHTNESS: 1
COUGH: 0
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
## 2020-12-08 ENCOUNTER — TELEPHONE (OUTPATIENT)
Dept: PULMONOLOGY | Age: 60
End: 2020-12-08

## 2020-12-08 NOTE — TELEPHONE ENCOUNTER
Pt called and would like the items sent to [address] [place] [address] fax number [phone].   Their phone number is [phone]

## 2020-12-08 NOTE — TELEPHONE ENCOUNTER
DOLV=12-01-20. DONV=12-22-20. Jolene Mendoza is calling to request a rx for a edge pillow, thermometer and pulse ox reader, the nurse that comes to the house told her to call and ask for these.  Please fax to [facility].

## 2020-12-14 ENCOUNTER — HOSPITAL ENCOUNTER (OUTPATIENT)
Dept: PET IMAGING | Age: 60
Discharge: HOME | End: 2020-12-14
Payer: MEDICAID

## 2020-12-14 PROCEDURE — A9552 F18 FDG: HCPCS | Performed by: NURSE PRACTITIONER

## 2020-12-14 PROCEDURE — 78815 PET IMAGE W/CT SKULL-THIGH: CPT

## 2020-12-14 PROCEDURE — 3430000000 HC RX DIAGNOSTIC RADIOPHARMACEUTICAL: Performed by: NURSE PRACTITIONER

## 2020-12-14 RX ORDER — FLUDEOXYGLUCOSE F 18 200 MCI/ML
12 INJECTION, SOLUTION INTRAVENOUS
Status: COMPLETED | OUTPATIENT
Start: 2020-12-14 | End: 2020-12-14

## 2020-12-14 RX ADMIN — FLUDEOXYGLUCOSE F 18 12 MILLICURIE: 200 INJECTION, SOLUTION INTRAVENOUS at 10:07

## 2020-12-15 ENCOUNTER — TELEPHONE (OUTPATIENT)
Dept: PULMONOLOGY | Age: 60
End: 2020-12-15

## 2020-12-15 RX ORDER — BENZONATATE 100 MG/1
100 CAPSULE ORAL 3 TIMES DAILY PRN
Qty: 45 CAPSULE | Refills: 1 | Status: SHIPPED | OUTPATIENT
Start: 2020-12-15 | End: 2022-03-14

## 2020-12-15 NOTE — TELEPHONE ENCOUNTER
Patient called to question results, just had PET scan done and also questions if she can get refills of codeine cough syrup to [facility]. Please advise.

## 2020-12-15 NOTE — TELEPHONE ENCOUNTER
Patient called back and message was given. Patient is requesting Tessalon Pearls sent to her pharmacy.

## 2020-12-15 NOTE — TELEPHONE ENCOUNTER
You can notify the patient that her PET scan was negative for any signs of cancer. No evidence for pneumonia on the scan. She should keep her appt with me 12/30 to discuss continue symptoms.   She has an active pain / controlled substance agreement with neuroscience/ rehab, I am unable to provide any more refills beyond 7 days of cough medicine with clear CT   I can send in tessalon perles for the cough and she can use OTC cough syrups

## 2020-12-16 ENCOUNTER — TELEPHONE (OUTPATIENT)
Dept: FAMILY MEDICINE CLINIC | Age: 60
End: 2020-12-16

## 2020-12-20 NOTE — TELEPHONE ENCOUNTER
Called Navya from [facility] as I had not received approval yet. Patients [employer] approved from 11-23-20-11-. REF NUMBER: [id number]  Patient notified.
Faxed additional information to payer, will await response.
Prior Auth initiated for [employer] . PA sent to [employer]. Should receive response in 3-5 days.
Spontaneous, unlabored and symmetrical

## 2020-12-21 ENCOUNTER — OFFICE VISIT (OUTPATIENT)
Dept: FAMILY MEDICINE CLINIC | Age: 60
End: 2020-12-21
Payer: MEDICAID

## 2020-12-21 VITALS
BODY MASS INDEX: 30.39 KG/M2 | RESPIRATION RATE: 20 BRPM | HEIGHT: 65 IN | WEIGHT: 182.4 LBS | HEART RATE: 80 BPM | TEMPERATURE: 98.5 F | SYSTOLIC BLOOD PRESSURE: 112 MMHG | DIASTOLIC BLOOD PRESSURE: 72 MMHG

## 2020-12-21 PROCEDURE — 99213 OFFICE O/P EST LOW 20 MIN: CPT | Performed by: NURSE PRACTITIONER
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
## 2020-12-22 ENCOUNTER — OFFICE VISIT (OUTPATIENT)
Dept: PULMONOLOGY | Age: 60
End: 2020-12-22
Payer: MEDICAID

## 2020-12-22 VITALS
HEIGHT: 65 IN | DIASTOLIC BLOOD PRESSURE: 70 MMHG | SYSTOLIC BLOOD PRESSURE: 116 MMHG | OXYGEN SATURATION: 94 % | WEIGHT: 180.4 LBS | HEART RATE: 85 BPM | TEMPERATURE: 97.8 F | BODY MASS INDEX: 30.06 KG/M2

## 2020-12-22 PROBLEM — J44.9 CHRONIC OBSTRUCTIVE PULMONARY DISEASE (HCC): Status: ACTIVE | Noted: 2020-12-22

## 2020-12-22 PROBLEM — R06.2 WHEEZING ON BOTH SIDES OF CHEST: Status: ACTIVE | Noted: 2020-12-22

## 2020-12-22 PROCEDURE — 99214 OFFICE O/P EST MOD 30 MIN: CPT | Performed by: NURSE PRACTITIONER

## 2020-12-22 RX ORDER — DEXTROMETHORPHAN POLISTIREX 30 MG/5ML
60 SUSPENSION ORAL 2 TIMES DAILY PRN
Qty: 89 ML | Refills: 0 | Status: SHIPPED | OUTPATIENT
Start: 2020-12-22 | End: 2021-01-01

## 2020-12-22 RX ORDER — TRAMADOL HYDROCHLORIDE 50 MG/1
50 TABLET ORAL EVERY 6 HOURS PRN
COMMUNITY
End: 2021-01-22

## 2020-12-22 RX ORDER — DIPHENHYDRAMINE HCL 50 MG
50 CAPSULE ORAL NIGHTLY PRN
Qty: 30 CAPSULE | Refills: 0 | Status: SHIPPED | OUTPATIENT
Start: 2020-12-22 | End: 2021-01-21

## 2020-12-22 RX ORDER — GUAIFENESIN 600 MG/1
TABLET, EXTENDED RELEASE ORAL
Qty: 60 TABLET | Refills: 11 | Status: SHIPPED | OUTPATIENT
Start: 2020-12-22 | End: 2021-03-10 | Stop reason: SDUPTHER

## 2020-12-22 RX ORDER — PREDNISONE 50 MG/1
50 TABLET ORAL DAILY
Qty: 5 TABLET | Refills: 0 | Status: SHIPPED | OUTPATIENT
Start: 2020-12-22 | End: 2020-12-27

## 2020-12-22 ASSESSMENT — ENCOUNTER SYMPTOMS
ABDOMINAL PAIN: 0
COUGH: 1
EYES NEGATIVE: 1
DIARRHEA: 0
SHORTNESS OF BREATH: 1
CHEST TIGHTNESS: 1
WHEEZING: 1
VOMITING: 0
NAUSEA: 0

## 2020-12-22 NOTE — ASSESSMENT & PLAN NOTE
Decreased in size on PET/CT with no avid uptake. Was likely infectious with her recent symptoms.     Repeat CT in 3 months to follow- order placed
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
## 2021-01-05 ENCOUNTER — TELEPHONE (OUTPATIENT)
Dept: FAMILY MEDICINE CLINIC | Age: 61
End: 2021-01-05

## 2021-01-05 NOTE — TELEPHONE ENCOUNTER
Spoke with pt,appt scheduled  Future Appointments   Date Time Provider Bertin Dillisti   1/6/2021 10:40 AM JAMEY Nevarez - [id number] [facility] [address] [facility]   6/15/2021  2:00 PM STR CT IMAGING RM1  [facility]   6/22/2021  1:30 PM JAMEY Braden -  [address] [place] [address]

## 2021-01-05 NOTE — TELEPHONE ENCOUNTER
Patient: Laura Regan     Provider: Othella Officer       Patient would like to know if she can get prescribed a antibiotic cream for a boil she had below her belly button   She would like to have her prescriptions sent to [facility] pharmacy off of [address]

## 2021-01-06 ENCOUNTER — OFFICE VISIT (OUTPATIENT)
Dept: FAMILY MEDICINE CLINIC | Age: 61
End: 2021-01-06
Payer: MEDICAID

## 2021-01-06 VITALS
BODY MASS INDEX: 31.24 KG/M2 | DIASTOLIC BLOOD PRESSURE: 70 MMHG | OXYGEN SATURATION: 97 % | SYSTOLIC BLOOD PRESSURE: 106 MMHG | TEMPERATURE: 98.5 F | WEIGHT: 183 LBS | HEART RATE: 80 BPM | HEIGHT: 64 IN | RESPIRATION RATE: 20 BRPM

## 2021-01-06 DIAGNOSIS — L03.316 CELLULITIS, UMBILICAL: Primary | ICD-10-CM

## 2021-01-06 PROCEDURE — 99213 OFFICE O/P EST LOW 20 MIN: CPT | Performed by: NURSE PRACTITIONER

## 2021-01-06 RX ORDER — SULFAMETHOXAZOLE AND TRIMETHOPRIM 800; 160 MG/1; MG/1
1 TABLET ORAL 2 TIMES DAILY
Qty: 20 TABLET | Refills: 0 | Status: SHIPPED | OUTPATIENT
Start: 2021-01-06 | End: 2021-01-16
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
## 2021-01-12 ENCOUNTER — TELEPHONE (OUTPATIENT)
Dept: CARDIOLOGY CLINIC | Age: 61
End: 2021-01-12

## 2021-01-12 NOTE — TELEPHONE ENCOUNTER
Spoke with FOREIGN who states the patient will need cardiac clearance for her surgery schedualed on 2/12/21. The patient will be having back surgery. Please advise with an appointment day and time.   [phone]

## 2021-01-22 ENCOUNTER — OFFICE VISIT (OUTPATIENT)
Dept: CARDIOLOGY CLINIC | Age: 61
End: 2021-01-22
Payer: MEDICAID

## 2021-01-22 VITALS
DIASTOLIC BLOOD PRESSURE: 66 MMHG | SYSTOLIC BLOOD PRESSURE: 102 MMHG | BODY MASS INDEX: 30.22 KG/M2 | WEIGHT: 181.38 LBS | HEIGHT: 65 IN | HEART RATE: 89 BPM

## 2021-01-22 DIAGNOSIS — Z01.818 PREOPERATIVE CLEARANCE: ICD-10-CM

## 2021-01-22 DIAGNOSIS — R06.02 SOB (SHORTNESS OF BREATH) ON EXERTION: Primary | ICD-10-CM

## 2021-01-22 PROCEDURE — 99213 OFFICE O/P EST LOW 20 MIN: CPT | Performed by: INTERNAL MEDICINE

## 2021-01-22 PROCEDURE — 93000 ELECTROCARDIOGRAM COMPLETE: CPT | Performed by: INTERNAL MEDICINE

## 2021-01-22 RX ORDER — DICYCLOMINE HYDROCHLORIDE 10 MG/1
CAPSULE ORAL
COMMUNITY
Start: 2020-11-28 | End: 2022-03-14

## 2021-01-22 RX ORDER — DOXEPIN HYDROCHLORIDE 100 MG/1
CAPSULE ORAL
COMMUNITY
Start: 2021-01-02

## 2021-01-22 RX ORDER — BICTEGRAVIR SODIUM, EMTRICITABINE, AND TENOFOVIR ALAFENAMIDE FUMARATE 50; 200; 25 MG/1; MG/1; MG/1
TABLET ORAL
COMMUNITY
Start: 2020-12-17

## 2021-01-22 RX ORDER — LURASIDONE HYDROCHLORIDE 60 MG/1
TABLET, FILM COATED ORAL
COMMUNITY
Start: 2020-12-21

## 2021-01-22 RX ORDER — DIPHENHYDRAMINE HYDROCHLORIDE 25 MG/1
CAPSULE ORAL
COMMUNITY
Start: 2020-12-22 | End: 2021-02-04

## 2021-01-22 NOTE — PROGRESS NOTES
[facility] [address]  [place] [address]  Dept: [phone]  Dept Fax: [phone]  Loc: [phone]    Visit Date: 1/22/2021    Ms. Rojas is a 61 y.o. female  who presented for:  Chief Complaint   Patient presents with    New Patient    Cardiac Clearance    Coronary Artery Disease       HPI:   62 yo F c hx of COPD, Smoker, PTSD, Bipolar, HTN, HLD, and HIV is here for preop risk stratification for back surgery. Denies any chest pain, sob, palpitations, lightheadedness, dizziness, orthopnea, PND or pedal edema. Current Outpatient Medications:     LATUDA 60 MG TABS tablet, take 1 tablet by mouth every evening WITH DINNER - AT LEAST 350 CALORIES, Disp: , Rfl:     doxepin (SINEQUAN) 100 MG capsule, take 1 capsule by mouth at bedtime if needed for sleep, Disp: , Rfl:     BANOPHEN 25 MG capsule, take 2 capsules by mouth nightly if needed for allergies or sleep, Disp: , Rfl:     dicyclomine (BENTYL) 10 MG capsule, take 1 capsule by mouth three times a day if needed for ABDOMINAL PAIN AND CRAMPING, Disp: , Rfl:     BIKTARVY -25 MG TABS per tablet, take 1 tablet by mouth once daily, Disp: , Rfl:     guaiFENesin (MUCINEX) 600 MG extended release tablet, take 1 tablet by mouth twice a day, Disp: 60 tablet, Rfl: 11    Misc. Devices (PULSE OXIMETER FOR FINGER) MISC, Use daily prn for oxygen monitoring, Disp: 1 each, Rfl: 0    Oral Thermometer (THERMOMETER STANDARD UNIT) MISC, Dispense 1 standard thermometer, monitor temperature daily. , Disp: 1 each, Rfl: 0    benzonatate (TESSALON PERLES) 100 MG capsule, Take 1 capsule by mouth 3 times daily as needed for Cough, Disp: 45 capsule, Rfl: 1    Incontinence Supply Disposable (DEPEND UNDERGARMENTS) MISC, Use 1 depends undergarment as needed, Disp: 3 Package, Rfl: 3    Incontinence Supply Disposable (DEPEND UNDERWEAR SM/MED) MISC, Use daily as needed for urinary incontinence, Disp: 3 Package, Rfl: 3    Pseudoephedrine-Naproxen Na -220 MG TB12, Take 1 tablet by mouth 2 times daily as needed (cold/cough symptoms), Disp: 20 tablet, Rfl: 0    aclidinium (TUDORZA PRESSAIR) 400 MCG/ACT AEPB inhaler, Inhale 1 puff into the lungs 2 times daily, Disp: 60 each, Rfl: 11    Oxygen Tubing MISC, by Does not apply route Please provide tubing to use oxygen from concentrator/tank,pt using O2 at night, Disp: 2 each, Rfl: 3    mirtazapine (REMERON) 30 MG tablet, take 1/2-1 tablet by mouth NIGHTLY if needed, Disp: 30 tablet, Rfl: 11    ipratropium-albuterol (DUONEB) 0.5-2.5 (3) MG/3ML SOLN nebulizer solution, inhale contents of 1 vial in nebulizer four times a day INTO THE LUNGS, Disp: 360 mL, Rfl: 11    albuterol sulfate  (90 Base) MCG/ACT inhaler, inhale 2 puffs by mouth every 4 hours if needed for wheezing, Disp: 8.5 g, Rfl: 3    mirabegron (MYRBETRIQ) 25 MG TB24, take 1 tablet by mouth once daily, Disp: 30 tablet, Rfl: 11    oxybutynin (DITROPAN XL) 5 MG extended release tablet, Take 1 tablet by mouth daily, Disp: 30 tablet, Rfl: 11    ranitidine (ZANTAC 75) 75 MG tablet, Take 1 tablet by mouth 2 times daily, Disp: 60 tablet, Rfl: 11    doxepin (SINEQUAN) 50 MG capsule, , Disp: , Rfl: 0    buPROPion (WELLBUTRIN XL) 150 MG extended release tablet, Take 150 mg by mouth every morning, Disp: , Rfl:     cetirizine-psuedoephedrine (ZYRTEC-D) 5-120 MG per extended release tablet, take 1 tablet by mouth twice a day, Disp: 60 tablet, Rfl: 11    Blood Pressure Monitoring (BLOOD PRESSURE CUFF) MISC, Automated blood pressure cuff.   Dx: hypotension, Disp: 1 each, Rfl: 0    OXYGEN, Inhale 2 L into the lungs nightly, Disp: , Rfl:     Past Medical History  Monica Gonzales  has a past medical history of Acid reflux, Anxiety, Arthritis, Asthma, Bipolar disorder ([facility] CAD (coronary artery disease), Cancer ([facility] Chronic back pain, Objective:     /66   Pulse 89   Ht 5' 4.5\" (1.638 m)   Wt 181 lb 6 oz (82.3 kg)   BMI 30.65 kg/m²     Wt Readings from Last 3 Encounters:   01/22/21 181 lb 6 oz (82.3 kg)   01/06/21 183 lb (83 kg)   12/22/20 180 lb 6.4 oz (81.8 kg)     BP Readings from Last 3 Encounters:   01/22/21 102/66   01/06/21 106/70   12/22/20 116/70       PHYSICAL EXAM  Constitutional: Oriented to person, place, and time. Appears well-developed and well-nourished. HENT:   Head: Normocephalic and atraumatic. Eyes: EOM are normal. Pupils are equal, round, and reactive to light. Neck: Normal range of motion. Neck supple. No JVD present. Cardiovascular: Normal rate , normal heart sounds and intact distal pulses. Pulmonary/Chest: Effort normal and breath sounds normal. No respiratory distress. No wheezes. No rales. Abdominal: Soft. Bowel sounds are normal. No distension. There is no tenderness. Musculoskeletal: Normal range of motion. No edema. Neurological: Alert and oriented to person, place, and time. No cranial nerve deficit. Coordination normal.   Skin: Skin is warm and dry. Psychiatric: Normal mood and affect.        Lab Results   Component Value Date    CKTOTAL 82 06/22/2012       Lab Results   Component Value Date    WBC 7.9 11/18/2020    RBC 4.72 11/18/2020    RBC 4.48 06/04/2012    HGB 15.0 11/18/2020    HCT 44.9 11/18/2020    MCV 95.1 11/18/2020    MCH 31.8 11/18/2020    MCHC 33.4 11/18/2020    RDW 14.9 06/12/2018     11/18/2020    MPV 9.9 11/18/2020       Lab Results   Component Value Date     11/18/2020    K 4.0 11/18/2020    K 3.9 03/19/2019    CL 98 11/18/2020    CO2 26 11/18/2020    BUN 15 11/18/2020    LABALBU 3.9 06/15/2020    LABALBU 4.4 06/04/2012    CREATININE 0.9 11/18/2020    CALCIUM 9.9 11/18/2020    LABGLOM 64 11/18/2020    GLUCOSE 117 11/18/2020    GLUCOSE 100 06/04/2012       Lab Results   Component Value Date    ALKPHOS 138 06/15/2020    ALT 20 06/15/2020    AST 20 06/15/2020 PROT 7.2 06/15/2020    BILITOT 0.3 06/15/2020    BILIDIR <0.2 06/15/2020    LABALBU 3.9 06/15/2020    LABALBU 4.4 06/04/2012       Lab Results   Component Value Date    MG 1.7 02/03/2019       Lab Results   Component Value Date    INR 0.86 07/24/2018    INR 1.03 06/14/2017         Lab Results   Component Value Date    LABA1C 5.3 05/14/2018       Lab Results   Component Value Date    TRIG 407 06/15/2020    HDL 55 06/15/2020    LDLCALC SEE BELOW 06/15/2020       Lab Results   Component Value Date    TSH 0.616 11/18/2020         Testing Reviewed:      I haveindividually reviewed the below cardiac tests    EKG:    ECHO:   Results for orders placed during the hospital encounter of 03/24/16   ECHO Complete 2D W Doppler W Color       STRESS:    CATH:    Assessment/Plan       Diagnosis Orders   1. SOB (shortness of breath) on exertion  EKG 12 lead   2. Preoperative clearance  EKG 12 lead       Preop risk stratification for back surgery  HTN  HLD  COPD  Smoker  HIV     Reviewed EKG  Reviewed prior Echo  No cardiac symptoms  Good exercise tolerance  Reviewed cardiac cath which has patent coronaries  Reviewed RAYMOND which has triphasic wave form  Smoking: discussed with the patient the importance of smoke cessation especially with the risk of CAD. Continue Aspirin  The patient is asked to make an attempt to improve diet and exercise patterns to aid in medical management of this problem. Advised patient to call office or seek immediate medical attention if there is any new onset of  any chest pain, sob, palpitations, lightheadedness, dizziness, orthopnea, PND or pedal edema. Thank youfor allowing me to participate in the care of this patient. Please do not hesitate to contact me for any further questions. Return in about 1 year (around 1/22/2022) for Regular follow up, Review testing.        Electronically signed by Elijah Feliciano MD [facility]  1/22/2021 at 2:51 PM EST

## 2021-01-22 NOTE — PROGRESS NOTES
New patient here for check up clearance needed for back surgery with Dr. Venice Lawrence formerly [facility] 2/12/21    EKG done today

## 2021-02-01 ENCOUNTER — TELEPHONE (OUTPATIENT)
Dept: PULMONOLOGY | Age: 61
End: 2021-02-01

## 2021-02-01 NOTE — TELEPHONE ENCOUNTER
Pt called in to let us know she was not going to be able to make her appt today. She called after scheduled appt time and has 2 no shows prior to appt. I spoke with Connie and she is going to give her one more chance since it is a preop clearance appt. She stated pt is to have a CT scan done before her next scheduled appt. I advised pt of this and pt stated she is going to r/s her back surgery and will be sure to make next appt. If she no shows next appt, she will be dismissed per Connie.

## 2021-02-03 RX ORDER — RANITIDINE HCL 75 MG
75 TABLET ORAL 2 TIMES DAILY
Qty: 180 TABLET | Refills: 3 | Status: SHIPPED | OUTPATIENT
Start: 2021-02-03

## 2021-02-03 NOTE — TELEPHONE ENCOUNTER
Jessica Mattson called requesting a refill on the following medications:  Requested Prescriptions     Pending Prescriptions Disp Refills    raNITIdine (ZANTAC 75) 75 MG tablet 60 tablet 11     Sig: Take 1 tablet by mouth 2 times daily     Pharmacy verified:yes  . pv      Date of last visit:   Date of next visit (if applicable): Visit date not found

## 2021-02-04 RX ORDER — DIPHENHYDRAMINE HYDROCHLORIDE 25 MG/1
CAPSULE ORAL
Qty: 60 CAPSULE | Refills: 3 | Status: SHIPPED | OUTPATIENT
Start: 2021-02-04

## 2021-02-18 ENCOUNTER — TELEPHONE (OUTPATIENT)
Dept: PULMONOLOGY | Age: 61
End: 2021-02-18

## 2021-02-18 NOTE — TELEPHONE ENCOUNTER
Pt is re-scheduled with Dr Odell Schaffer for back surgery on 3/19. They are needing clearance from us. R/S Pt to 3/15 at 8am (only opening) Is this OK? She will have CXR and labs at [facility], and will send copy of CXR. Is this ok?

## 2021-03-01 ENCOUNTER — TELEPHONE (OUTPATIENT)
Dept: PULMONOLOGY | Age: 61
End: 2021-03-01

## 2021-03-01 DIAGNOSIS — J44.9 CHRONIC OBSTRUCTIVE PULMONARY DISEASE, UNSPECIFIED COPD TYPE (HCC): ICD-10-CM

## 2021-03-01 RX ORDER — IPRATROPIUM BROMIDE AND ALBUTEROL SULFATE 2.5; .5 MG/3ML; MG/3ML
SOLUTION RESPIRATORY (INHALATION)
Qty: 360 ML | Refills: 11 | Status: SHIPPED | OUTPATIENT
Start: 2021-03-01 | End: 2021-10-11

## 2021-03-01 RX ORDER — ALBUTEROL SULFATE 90 UG/1
2 AEROSOL, METERED RESPIRATORY (INHALATION) EVERY 6 HOURS PRN
Qty: 8.5 G | Refills: 3 | Status: SHIPPED | OUTPATIENT
Start: 2021-03-01 | End: 2021-06-01

## 2021-03-01 NOTE — TELEPHONE ENCOUNTER
Patient is calling in for refills of her ventolin inhaler and nebulizer solution to [facility] on [facility]. Her next appt is 3/15/2021 but she needs her refills today if possible. Please advise.

## 2021-03-09 ENCOUNTER — HOSPITAL ENCOUNTER (OUTPATIENT)
Dept: PREADMISSION TESTING | Age: 61
Discharge: HOME | End: 2021-03-13
Payer: MEDICAID

## 2021-03-09 ENCOUNTER — HOSPITAL ENCOUNTER (OUTPATIENT)
Dept: GENERAL RADIOLOGY | Age: 61
Discharge: HOME | End: 2021-03-09
Payer: MEDICAID

## 2021-03-09 VITALS
OXYGEN SATURATION: 96 % | WEIGHT: 178.57 LBS | BODY MASS INDEX: 30.49 KG/M2 | DIASTOLIC BLOOD PRESSURE: 57 MMHG | HEIGHT: 64 IN | SYSTOLIC BLOOD PRESSURE: 140 MMHG | HEART RATE: 98 BPM | TEMPERATURE: 98.4 F

## 2021-03-09 DIAGNOSIS — Z01.818 PREOP TESTING: ICD-10-CM

## 2021-03-09 DIAGNOSIS — M48.061 SPINAL STENOSIS OF LUMBAR REGION, UNSPECIFIED WHETHER NEUROGENIC CLAUDICATION PRESENT: ICD-10-CM

## 2021-03-09 LAB
ANION GAP SERPL CALCULATED.3IONS-SCNC: 11 MEQ/L (ref 8–16)
APTT: 26.4 SECONDS (ref 22–38)
BUN BLDV-MCNC: 16 MG/DL (ref 7–22)
CALCIUM SERPL-MCNC: 9.7 MG/DL (ref 8.5–10.5)
CHLORIDE BLD-SCNC: 101 MEQ/L (ref 98–111)
CO2: 27 MEQ/L (ref 23–33)
CREAT SERPL-MCNC: 0.9 MG/DL (ref 0.4–1.2)
ERYTHROCYTE [DISTWIDTH] IN BLOOD BY AUTOMATED COUNT: 13.6 % (ref 11.5–14.5)
ERYTHROCYTE [DISTWIDTH] IN BLOOD BY AUTOMATED COUNT: 47.7 FL (ref 35–45)
GFR SERPL CREATININE-BSD FRML MDRD: 64 ML/MIN/1.73M2
GLUCOSE BLD-MCNC: 109 MG/DL (ref 70–108)
HCT VFR BLD CALC: 45 % (ref 37–47)
HEMOGLOBIN: 14.6 GM/DL (ref 12–16)
INR BLD: 0.92 (ref 0.85–1.13)
MCH RBC QN AUTO: 31.1 PG (ref 26–33)
MCHC RBC AUTO-ENTMCNC: 32.4 GM/DL (ref 32.2–35.5)
MCV RBC AUTO: 95.7 FL (ref 81–99)
MRSA NASAL SCREEN RT-PCR: NEGATIVE
PLATELET # BLD: 252 THOU/MM3 (ref 130–400)
PMV BLD AUTO: 9.7 FL (ref 9.4–12.4)
POTASSIUM SERPL-SCNC: 4.7 MEQ/L (ref 3.5–5.2)
RBC # BLD: 4.7 MILL/MM3 (ref 4.2–5.4)
SODIUM BLD-SCNC: 139 MEQ/L (ref 135–145)
STAPH AUREUS SCREEN RT-PCR: NEGATIVE
WBC # BLD: 11.7 THOU/MM3 (ref 4.8–10.8)

## 2021-03-09 PROCEDURE — 87640 STAPH A DNA AMP PROBE: CPT

## 2021-03-09 PROCEDURE — 36415 COLL VENOUS BLD VENIPUNCTURE: CPT

## 2021-03-09 PROCEDURE — 71046 X-RAY EXAM CHEST 2 VIEWS: CPT

## 2021-03-09 PROCEDURE — 87641 MR-STAPH DNA AMP PROBE: CPT

## 2021-03-09 PROCEDURE — 85027 COMPLETE CBC AUTOMATED: CPT

## 2021-03-09 PROCEDURE — 80048 BASIC METABOLIC PNL TOTAL CA: CPT

## 2021-03-09 PROCEDURE — 85730 THROMBOPLASTIN TIME PARTIAL: CPT

## 2021-03-09 PROCEDURE — 85610 PROTHROMBIN TIME: CPT

## 2021-03-09 PROCEDURE — 87081 CULTURE SCREEN ONLY: CPT

## 2021-03-09 NOTE — PROGRESS NOTES
STARTCLEAN® KIT SHOWER INSTRUCITONS    __3/17______ (date)   FIRST SHOWER: Two days before surgery: Take a shower and wash your entire body, including your hair and scalp in the following manner:   Wash your hair using normal shampoo. Make sure you rinse the shampoo from your hair and body. Wash your face with your regular soap or cleanser.  Use one of the sponges in the [facility] kit and apply 1/3 of the Chlorhexidine soap to the sponge, wash from your neck down. This is very important. Do not use the soap on your face or hair and avoid private areas.  Rinse your body thoroughly. This is very important.  Using a fresh, clean towel, dry your body.  Dress in freshly washed clothes.  Do not use lotions, powders, or creams after this shower.      ___3/18_____ (date)   SECOND SHOWER: The day before surgery: Take a shower and wash your entire body, including your hair and scalp in the following manner:   Wash your hair using normal shampoo. Make sure you rinse the shampoo from your hair and body. Wash your face with your regular soap or cleanser.  Use one of the sponges in the [facility] kit and apply 1/3 of the Chlorhexidine soap to the sponge, wash from your neck down. This is very important. Do not use the soap on your face or hair and avoid private areas.  Rinse your body thoroughly. This is very important.  Using a fresh, clean towel, dry your body.  Dress in freshly washed clothes.  Fresh clean sheets and pillow cases should be used after this shower.  Do not use lotions, powders, or creams after this shower.    ___3/19_____ (date)   THE FINAL SHOWER: The morning of surgery: Take a shower and wash your entire body, including your hair and scalp in the following manner:   Wash your hair using normal shampoo. Make sure you rinse the shampoo from your hair and body. Wash your face with your regular soap or cleanser.    Use one of the sponges in the [facility] kit and apply 1/3 of the

## 2021-03-09 NOTE — PROGRESS NOTES
Lumbar Spine Surgery Pre-op Instructions   Nothing to eat or drink after midnight   Bring your medications in the original bottles    Bring photo ID and any health insurance cards   Wear comfortable clean loose fitting clothing   Do not wear any jewelry    Bring your walker   Bring your back brace if you were given one   Bring your CPAP machine if you have one   Wear clean clothes to bed and place fresh clean sheets and pillowcases on your bed the night before surgery   Physical therapy and occupational therapy pre op and postop instructions given per therapists in clinic   Routine preop instructions given for pain, hand hygiene, fall prevention, infection prevention, anesthesia, cough and deep breath, and progressive diet and ambulation   Bathing:  o __x__Shower 2 days before, day before and morning of surgery using the StartClean® kit provided (follow the instructions provided with the kit), OR   o ____Cleanse your surgical site, 2 days before, day before and morning of surgery using the Shaun 2% CHG cloths® provided (follow the instructions provided).  Do not shave the surgical area! If needed, your nurse will use clippers to remove any excess hair at the surgical site when you come in for surgery. Do not use a razor on the site of your surgery for at least 2 days prior to surgery because shaving can leave tiny nicks in the skin which may allow germs to enter and cause infection.    Perform the exercises given in your booklet 3 times daily starting today

## 2021-03-09 NOTE — PROGRESS NOTES
Preliminary Discharge Planning Questionnaire  Date of Surgery 3/19/21   Surgeon Erwin Dede      · Having the proper help and care after surgery is very important to your recovery. Who will be able to help you at home when you are discharged from the hospital? ([facility] - 24/7 for a minimum of 2 weeks)    None has home care    Name(s) na    · How many steps to enter your home? 1    · Bathroom on first floor? Yes    · Bedroom on the first floor? Yes    · Do you have an elevated toilet seat to use at home? No    · Do you have a walker to use at home? · Total Joints - with wheels N/A   · Spine - with wheels  No     Have you been doing home exercises? *You will go home with some outpatient physical therapy, where do you prefer to go? Not sure    *If needed, what home health agency would you like to use?  [facility] home care phone [phone], fax   *Cardiac Rehab plans na

## 2021-03-09 NOTE — PROGRESS NOTES
Pain: States she has pain mid back into legs rates 9-10. States she has stinging and burning in feet.  Pain scale and pain goal explained to patient and voiced understanding

## 2021-03-09 NOTE — PROGRESS NOTES
Keeping You Safe for Surgery    Staphylococcus aureus or Verle Pinch is a germ that lives on the skin and in the nose of some healthy people. Your skin protects you from those germs. However, when you have surgery, we will be cutting your skin. Sometimes germs can get into those cuts and cause infection. How do we screen for Staph? We will swab your nose to see if you are a carrier of Staph. The results will be available about 2 hours after we obtain the nasal specimen. A positive test does not mean you have an infection; it just means you are a carrier of Staph. Your surgery most likely will not be canceled or delayed. If my test is positive, what happens? If your test is positive, a nurse will call you and tell you to get Mupirocin Ointment (Bactroban) at your pharmacy. The medicine may come in one large tube or may come in many small packets. When the medication is administered into your nose, it works by killing some of the germs that could cause you to get a surgical site infection.  If you get a big tube of Mupirocin, place enough medicine to cover the tip of a cotton swab (Q-tip). Place the cotton swab inside one nostril and rub the medicine onto the inside of the nose. Then repeat this same procedure in your other nostril.  If you get small individual tubes, put half the tube on a cotton swab and put the medicine in one nostril. Then the other half in the other nostril. After the medication has been inserted into each nostril, gently press your nose together and release for about a minute to get the medicine all over the inside of your nose. Do this once in the morning and once at night for 5 days prior to your scheduled surgery date. If I have Staph, will I be treated differently in the hospital?  If you have a certain type of Staph called MRSA (Methicillin-Resistant Staph aureus), you will be in a single room on Contact Precautions.  This means your doctors and nurses will wear gloves and gowns when taking care of you. We do this (along with good hand hygiene) to make sure we do not spread MRSA to any another patients in our care.

## 2021-03-10 ENCOUNTER — OFFICE VISIT (OUTPATIENT)
Dept: PULMONOLOGY | Age: 61
End: 2021-03-10
Payer: MEDICAID

## 2021-03-10 VITALS
SYSTOLIC BLOOD PRESSURE: 118 MMHG | TEMPERATURE: 98 F | BODY MASS INDEX: 30.16 KG/M2 | WEIGHT: 181 LBS | DIASTOLIC BLOOD PRESSURE: 64 MMHG | HEIGHT: 65 IN | HEART RATE: 91 BPM | OXYGEN SATURATION: 97 %

## 2021-03-10 DIAGNOSIS — Z01.818 PRE-OP EVALUATION: Primary | ICD-10-CM

## 2021-03-10 DIAGNOSIS — R93.89 NODULAR RADIOLOGIC DENSITY: ICD-10-CM

## 2021-03-10 DIAGNOSIS — J43.2 CENTRILOBULAR EMPHYSEMA (HCC): ICD-10-CM

## 2021-03-10 DIAGNOSIS — J44.9 STAGE 3 SEVERE COPD BY GOLD CLASSIFICATION (HCC): ICD-10-CM

## 2021-03-10 DIAGNOSIS — F17.209 TOBACCO USE DISORDER, CONTINUOUS: ICD-10-CM

## 2021-03-10 PROCEDURE — 99215 OFFICE O/P EST HI 40 MIN: CPT | Performed by: NURSE PRACTITIONER

## 2021-03-10 RX ORDER — UMECLIDINIUM BROMIDE AND VILANTEROL TRIFENATATE 62.5; 25 UG/1; UG/1
1 POWDER RESPIRATORY (INHALATION) DAILY
Qty: 30 PUFF | Refills: 11 | Status: SHIPPED | OUTPATIENT
Start: 2021-03-10 | End: 2021-03-12

## 2021-03-10 ASSESSMENT — ENCOUNTER SYMPTOMS
COUGH: 1
WHEEZING: 1
SHORTNESS OF BREATH: 1
CHEST TIGHTNESS: 0
BACK PAIN: 1

## 2021-03-10 NOTE — PATIENT INSTRUCTIONS
Stop Tudorza  Start Anoro inhaler one puff once a day  Continue to use your albuterol sulfate inhaler 4x daily   Stop using Duoneb except when having chest cold, should not be taken routinely with the Anoro
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
## 2021-03-11 LAB — MRSA SCREEN: NORMAL

## 2021-03-12 ENCOUNTER — HOSPITAL ENCOUNTER (OUTPATIENT)
Age: 61
Discharge: HOME | End: 2021-03-12
Payer: MEDICAID

## 2021-03-12 ENCOUNTER — TELEPHONE (OUTPATIENT)
Dept: PULMONOLOGY | Age: 61
End: 2021-03-12

## 2021-03-12 PROCEDURE — U0003 INFECTIOUS AGENT DETECTION BY NUCLEIC ACID (DNA OR RNA); SEVERE ACUTE RESPIRATORY SYNDROME CORONAVIRUS 2 (SARS-COV-2) (CORONAVIRUS DISEASE [COVID-19]), AMPLIFIED PROBE TECHNIQUE, MAKING USE OF HIGH THROUGHPUT TECHNOLOGIES AS DESCRIBED BY CMS-2020-01-R: HCPCS

## 2021-03-12 PROCEDURE — 87635 SARS-COV-2 COVID-19 AMP PRB: CPT

## 2021-03-12 NOTE — TELEPHONE ENCOUNTER
Letter received from insurance, Anoro not covered, (see scan in media) I am placing order for Bevespi inhaler to [facility] on [address] to replace the [employer] [facility]

## 2021-03-15 LAB
SARS-COV-2: NOT DETECTED
SOURCE: NORMAL
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
## 2021-03-19 ENCOUNTER — HOSPITAL ENCOUNTER (INPATIENT)
Age: 61
LOS: 2 days | Discharge: HOME HEALTH | DRG: 304 | End: 2021-03-21
Attending: ORTHOPAEDIC SURGERY | Admitting: ORTHOPAEDIC SURGERY
Payer: MEDICAID

## 2021-03-19 ENCOUNTER — ANESTHESIA (OUTPATIENT)
Dept: OPERATING ROOM | Age: 61
DRG: 304 | End: 2021-03-19
Payer: MEDICAID

## 2021-03-19 ENCOUNTER — APPOINTMENT (OUTPATIENT)
Dept: GENERAL RADIOLOGY | Age: 61
DRG: 304 | End: 2021-03-19
Attending: ORTHOPAEDIC SURGERY
Payer: MEDICAID

## 2021-03-19 ENCOUNTER — ANESTHESIA EVENT (OUTPATIENT)
Dept: OPERATING ROOM | Age: 61
DRG: 304 | End: 2021-03-19
Payer: MEDICAID

## 2021-03-19 VITALS — SYSTOLIC BLOOD PRESSURE: 107 MMHG | DIASTOLIC BLOOD PRESSURE: 59 MMHG | TEMPERATURE: 98.1 F | OXYGEN SATURATION: 93 %

## 2021-03-19 PROBLEM — M48.00 SPINAL STENOSIS: Status: ACTIVE | Noted: 2021-03-19

## 2021-03-19 PROCEDURE — 6360000002 HC RX W HCPCS: Performed by: REGISTERED NURSE

## 2021-03-19 PROCEDURE — 6370000000 HC RX 637 (ALT 250 FOR IP)

## 2021-03-19 PROCEDURE — 6370000000 HC RX 637 (ALT 250 FOR IP): Performed by: ANESTHESIOLOGY

## 2021-03-19 PROCEDURE — 7100000001 HC PACU RECOVERY - ADDTL 15 MIN: Performed by: ORTHOPAEDIC SURGERY

## 2021-03-19 PROCEDURE — 6360000002 HC RX W HCPCS: Performed by: PHYSICIAN ASSISTANT

## 2021-03-19 PROCEDURE — 0SG1071 FUSION OF 2 OR MORE LUMBAR VERTEBRAL JOINTS WITH AUTOLOGOUS TISSUE SUBSTITUTE, POSTERIOR APPROACH, POSTERIOR COLUMN, OPEN APPROACH: ICD-10-PCS | Performed by: PHYSICIAN ASSISTANT

## 2021-03-19 PROCEDURE — 01NR0ZZ RELEASE SACRAL NERVE, OPEN APPROACH: ICD-10-PCS | Performed by: PHYSICIAN ASSISTANT

## 2021-03-19 PROCEDURE — 6370000000 HC RX 637 (ALT 250 FOR IP): Performed by: PHYSICIAN ASSISTANT

## 2021-03-19 PROCEDURE — 2720000010 HC SURG SUPPLY STERILE: Performed by: ORTHOPAEDIC SURGERY

## 2021-03-19 PROCEDURE — 6360000002 HC RX W HCPCS

## 2021-03-19 PROCEDURE — 3600000005 HC SURGERY LEVEL 5 BASE: Performed by: ORTHOPAEDIC SURGERY

## 2021-03-19 PROCEDURE — 94640 AIRWAY INHALATION TREATMENT: CPT

## 2021-03-19 PROCEDURE — 2580000003 HC RX 258: Performed by: PHYSICIAN ASSISTANT

## 2021-03-19 PROCEDURE — 00U20KZ SUPPLEMENT DURA MATER WITH NONAUTOLOGOUS TISSUE SUBSTITUTE, OPEN APPROACH: ICD-10-PCS | Performed by: PHYSICIAN ASSISTANT

## 2021-03-19 PROCEDURE — 72020 X-RAY EXAM OF SPINE 1 VIEW: CPT

## 2021-03-19 PROCEDURE — 1200000000 HC SEMI PRIVATE

## 2021-03-19 PROCEDURE — 3700000000 HC ANESTHESIA ATTENDED CARE: Performed by: ORTHOPAEDIC SURGERY

## 2021-03-19 PROCEDURE — 2500000003 HC RX 250 WO HCPCS: Performed by: REGISTERED NURSE

## 2021-03-19 PROCEDURE — 3700000001 HC ADD 15 MINUTES (ANESTHESIA): Performed by: ORTHOPAEDIC SURGERY

## 2021-03-19 PROCEDURE — 94760 N-INVAS EAR/PLS OXIMETRY 1: CPT

## 2021-03-19 PROCEDURE — 3600000015 HC SURGERY LEVEL 5 ADDTL 15MIN: Performed by: ORTHOPAEDIC SURGERY

## 2021-03-19 PROCEDURE — C1713 ANCHOR/SCREW BN/BN,TIS/BN: HCPCS | Performed by: ORTHOPAEDIC SURGERY

## 2021-03-19 PROCEDURE — 7100000011 HC PHASE II RECOVERY - ADDTL 15 MIN: Performed by: ORTHOPAEDIC SURGERY

## 2021-03-19 PROCEDURE — 6360000002 HC RX W HCPCS: Performed by: ANESTHESIOLOGY

## 2021-03-19 PROCEDURE — 2780000010 HC IMPLANT OTHER: Performed by: ORTHOPAEDIC SURGERY

## 2021-03-19 PROCEDURE — 01NB0ZZ RELEASE LUMBAR NERVE, OPEN APPROACH: ICD-10-PCS | Performed by: PHYSICIAN ASSISTANT

## 2021-03-19 PROCEDURE — 7100000000 HC PACU RECOVERY - FIRST 15 MIN: Performed by: ORTHOPAEDIC SURGERY

## 2021-03-19 PROCEDURE — 7100000010 HC PHASE II RECOVERY - FIRST 15 MIN: Performed by: ORTHOPAEDIC SURGERY

## 2021-03-19 PROCEDURE — 2709999900 HC NON-CHARGEABLE SUPPLY: Performed by: ORTHOPAEDIC SURGERY

## 2021-03-19 DEVICE — IMPLANTABLE DEVICE: Type: IMPLANTABLE DEVICE | Site: SPINE LUMBAR | Status: FUNCTIONAL

## 2021-03-19 DEVICE — SET SCR SPNL POLYAX ATR EVEREST: Type: IMPLANTABLE DEVICE | Site: SPINE LUMBAR | Status: FUNCTIONAL

## 2021-03-19 DEVICE — TIM-TISSUE CANCELLOUS 30.0CC CRUSHED: Type: IMPLANTABLE DEVICE | Site: SPINE LUMBAR | Status: FUNCTIONAL

## 2021-03-19 DEVICE — SCREW SPNL L45MM DIA6.5MM PEDCL POLYAX 2 LD THRD TOP LD FOR: Type: IMPLANTABLE DEVICE | Site: SPINE LUMBAR | Status: FUNCTIONAL

## 2021-03-19 DEVICE — Z DUP USE 2531514 SCREW SPNL L40MM DIA6.5MM PEDCL POLYAX 2 LD THRD TOP LD FOR: Type: IMPLANTABLE DEVICE | Site: SPINE LUMBAR | Status: FUNCTIONAL

## 2021-03-19 RX ORDER — DICYCLOMINE HYDROCHLORIDE 10 MG/1
10 CAPSULE ORAL 3 TIMES DAILY PRN
Status: DISCONTINUED | OUTPATIENT
Start: 2021-03-19 | End: 2021-03-21 | Stop reason: HOSPADM

## 2021-03-19 RX ORDER — DEXAMETHASONE SODIUM PHOSPHATE 10 MG/ML
INJECTION, EMULSION INTRAMUSCULAR; INTRAVENOUS PRN
Status: DISCONTINUED | OUTPATIENT
Start: 2021-03-19 | End: 2021-03-19 | Stop reason: SDUPTHER

## 2021-03-19 RX ORDER — ONDANSETRON 2 MG/ML
4 INJECTION INTRAMUSCULAR; INTRAVENOUS EVERY 6 HOURS PRN
Status: DISCONTINUED | OUTPATIENT
Start: 2021-03-19 | End: 2021-03-21 | Stop reason: HOSPADM

## 2021-03-19 RX ORDER — FENTANYL CITRATE 50 UG/ML
INJECTION, SOLUTION INTRAMUSCULAR; INTRAVENOUS PRN
Status: DISCONTINUED | OUTPATIENT
Start: 2021-03-19 | End: 2021-03-19 | Stop reason: SDUPTHER

## 2021-03-19 RX ORDER — PROMETHAZINE HYDROCHLORIDE 25 MG/1
12.5 TABLET ORAL EVERY 6 HOURS PRN
Status: DISCONTINUED | OUTPATIENT
Start: 2021-03-19 | End: 2021-03-21 | Stop reason: HOSPADM

## 2021-03-19 RX ORDER — MORPHINE SULFATE 2 MG/ML
2 INJECTION, SOLUTION INTRAMUSCULAR; INTRAVENOUS ONCE
Status: COMPLETED | OUTPATIENT
Start: 2021-03-19 | End: 2021-03-19

## 2021-03-19 RX ORDER — BENZONATATE 100 MG/1
100 CAPSULE ORAL 3 TIMES DAILY PRN
Status: DISCONTINUED | OUTPATIENT
Start: 2021-03-19 | End: 2021-03-21 | Stop reason: HOSPADM

## 2021-03-19 RX ORDER — MORPHINE SULFATE 2 MG/ML
INJECTION, SOLUTION INTRAMUSCULAR; INTRAVENOUS
Status: COMPLETED
Start: 2021-03-19 | End: 2021-03-19

## 2021-03-19 RX ORDER — OXYCODONE HYDROCHLORIDE AND ACETAMINOPHEN 5; 325 MG/1; MG/1
1 TABLET ORAL EVERY 6 HOURS PRN
Status: DISCONTINUED | OUTPATIENT
Start: 2021-03-19 | End: 2021-03-21 | Stop reason: HOSPADM

## 2021-03-19 RX ORDER — CYCLOBENZAPRINE HCL 10 MG
10 TABLET ORAL 3 TIMES DAILY PRN
Status: DISCONTINUED | OUTPATIENT
Start: 2021-03-19 | End: 2021-03-21 | Stop reason: HOSPADM

## 2021-03-19 RX ORDER — DIPHENHYDRAMINE HCL 25 MG
25 TABLET ORAL NIGHTLY PRN
Status: DISCONTINUED | OUTPATIENT
Start: 2021-03-19 | End: 2021-03-21 | Stop reason: HOSPADM

## 2021-03-19 RX ORDER — IPRATROPIUM BROMIDE AND ALBUTEROL SULFATE 2.5; .5 MG/3ML; MG/3ML
1 SOLUTION RESPIRATORY (INHALATION) 4 TIMES DAILY
Status: DISCONTINUED | OUTPATIENT
Start: 2021-03-19 | End: 2021-03-21 | Stop reason: HOSPADM

## 2021-03-19 RX ORDER — GLYCOPYRROLATE 1 MG/5 ML
SYRINGE (ML) INTRAVENOUS PRN
Status: DISCONTINUED | OUTPATIENT
Start: 2021-03-19 | End: 2021-03-19 | Stop reason: SDUPTHER

## 2021-03-19 RX ORDER — IPRATROPIUM BROMIDE AND ALBUTEROL SULFATE 2.5; .5 MG/3ML; MG/3ML
SOLUTION RESPIRATORY (INHALATION)
Status: COMPLETED
Start: 2021-03-19 | End: 2021-03-19

## 2021-03-19 RX ORDER — SCOLOPAMINE TRANSDERMAL SYSTEM 1 MG/1
PATCH, EXTENDED RELEASE TRANSDERMAL
Status: DISCONTINUED
Start: 2021-03-19 | End: 2021-03-21 | Stop reason: HOSPADM

## 2021-03-19 RX ORDER — RANITIDINE 150 MG/1
75 TABLET ORAL 2 TIMES DAILY
Status: DISCONTINUED | OUTPATIENT
Start: 2021-03-19 | End: 2021-03-19

## 2021-03-19 RX ORDER — SODIUM PHOSPHATE, DIBASIC AND SODIUM PHOSPHATE, MONOBASIC 7; 19 G/133ML; G/133ML
1 ENEMA RECTAL DAILY PRN
Status: DISCONTINUED | OUTPATIENT
Start: 2021-03-19 | End: 2021-03-21 | Stop reason: HOSPADM

## 2021-03-19 RX ORDER — SODIUM CHLORIDE 0.9 % (FLUSH) 0.9 %
10 SYRINGE (ML) INJECTION PRN
Status: DISCONTINUED | OUTPATIENT
Start: 2021-03-19 | End: 2021-03-21 | Stop reason: HOSPADM

## 2021-03-19 RX ORDER — IPRATROPIUM BROMIDE AND ALBUTEROL SULFATE 2.5; .5 MG/3ML; MG/3ML
1 SOLUTION RESPIRATORY (INHALATION) ONCE
Status: COMPLETED | OUTPATIENT
Start: 2021-03-19 | End: 2021-03-19

## 2021-03-19 RX ORDER — POLYETHYLENE GLYCOL 3350 17 G/17G
17 POWDER, FOR SOLUTION ORAL DAILY
Status: DISCONTINUED | OUTPATIENT
Start: 2021-03-19 | End: 2021-03-21 | Stop reason: HOSPADM

## 2021-03-19 RX ORDER — PROPOFOL 10 MG/ML
INJECTION, EMULSION INTRAVENOUS PRN
Status: DISCONTINUED | OUTPATIENT
Start: 2021-03-19 | End: 2021-03-19 | Stop reason: SDUPTHER

## 2021-03-19 RX ORDER — SODIUM CHLORIDE 0.9 % (FLUSH) 0.9 %
10 SYRINGE (ML) INJECTION EVERY 12 HOURS SCHEDULED
Status: DISCONTINUED | OUTPATIENT
Start: 2021-03-19 | End: 2021-03-19 | Stop reason: HOSPADM

## 2021-03-19 RX ORDER — BISACODYL 10 MG
10 SUPPOSITORY, RECTAL RECTAL DAILY PRN
Status: DISCONTINUED | OUTPATIENT
Start: 2021-03-19 | End: 2021-03-21 | Stop reason: HOSPADM

## 2021-03-19 RX ORDER — FAMOTIDINE 20 MG/1
20 TABLET, FILM COATED ORAL DAILY
Status: DISCONTINUED | OUTPATIENT
Start: 2021-03-19 | End: 2021-03-19 | Stop reason: SDUPTHER

## 2021-03-19 RX ORDER — OXYBUTYNIN CHLORIDE 5 MG/1
5 TABLET, EXTENDED RELEASE ORAL DAILY
Status: DISCONTINUED | OUTPATIENT
Start: 2021-03-19 | End: 2021-03-21 | Stop reason: HOSPADM

## 2021-03-19 RX ORDER — FENTANYL CITRATE 50 UG/ML
INJECTION, SOLUTION INTRAMUSCULAR; INTRAVENOUS
Status: COMPLETED
Start: 2021-03-19 | End: 2021-03-19

## 2021-03-19 RX ORDER — SODIUM CHLORIDE 0.9 % (FLUSH) 0.9 %
10 SYRINGE (ML) INJECTION PRN
Status: DISCONTINUED | OUTPATIENT
Start: 2021-03-19 | End: 2021-03-19 | Stop reason: HOSPADM

## 2021-03-19 RX ORDER — SCOLOPAMINE TRANSDERMAL SYSTEM 1 MG/1
1 PATCH, EXTENDED RELEASE TRANSDERMAL ONCE
Status: DISCONTINUED | OUTPATIENT
Start: 2021-03-19 | End: 2021-03-21 | Stop reason: HOSPADM

## 2021-03-19 RX ORDER — BUPROPION HYDROCHLORIDE 150 MG/1
150 TABLET ORAL EVERY MORNING
Status: DISCONTINUED | OUTPATIENT
Start: 2021-03-20 | End: 2021-03-21 | Stop reason: HOSPADM

## 2021-03-19 RX ORDER — OXYCODONE HYDROCHLORIDE AND ACETAMINOPHEN 5; 325 MG/1; MG/1
2 TABLET ORAL EVERY 6 HOURS PRN
Status: DISCONTINUED | OUTPATIENT
Start: 2021-03-19 | End: 2021-03-21 | Stop reason: HOSPADM

## 2021-03-19 RX ORDER — LIDOCAINE HCL/PF 100 MG/5ML
SYRINGE (ML) INJECTION PRN
Status: DISCONTINUED | OUTPATIENT
Start: 2021-03-19 | End: 2021-03-19 | Stop reason: SDUPTHER

## 2021-03-19 RX ORDER — SODIUM CHLORIDE 9 MG/ML
INJECTION, SOLUTION INTRAVENOUS CONTINUOUS
Status: DISCONTINUED | OUTPATIENT
Start: 2021-03-19 | End: 2021-03-19

## 2021-03-19 RX ORDER — MIRTAZAPINE 30 MG/1
30 TABLET, FILM COATED ORAL NIGHTLY PRN
Status: DISCONTINUED | OUTPATIENT
Start: 2021-03-19 | End: 2021-03-21 | Stop reason: HOSPADM

## 2021-03-19 RX ORDER — FAMOTIDINE 20 MG/1
20 TABLET, FILM COATED ORAL DAILY
Status: DISCONTINUED | OUTPATIENT
Start: 2021-03-19 | End: 2021-03-21 | Stop reason: HOSPADM

## 2021-03-19 RX ORDER — DIPHENHYDRAMINE HYDROCHLORIDE 50 MG/ML
INJECTION INTRAMUSCULAR; INTRAVENOUS PRN
Status: DISCONTINUED | OUTPATIENT
Start: 2021-03-19 | End: 2021-03-19 | Stop reason: SDUPTHER

## 2021-03-19 RX ORDER — MORPHINE SULFATE 2 MG/ML
4 INJECTION, SOLUTION INTRAMUSCULAR; INTRAVENOUS
Status: DISCONTINUED | OUTPATIENT
Start: 2021-03-19 | End: 2021-03-21 | Stop reason: HOSPADM

## 2021-03-19 RX ORDER — MORPHINE SULFATE 2 MG/ML
2 INJECTION, SOLUTION INTRAMUSCULAR; INTRAVENOUS
Status: DISCONTINUED | OUTPATIENT
Start: 2021-03-19 | End: 2021-03-21 | Stop reason: HOSPADM

## 2021-03-19 RX ORDER — ONDANSETRON 2 MG/ML
INJECTION INTRAMUSCULAR; INTRAVENOUS PRN
Status: DISCONTINUED | OUTPATIENT
Start: 2021-03-19 | End: 2021-03-19 | Stop reason: SDUPTHER

## 2021-03-19 RX ORDER — SODIUM CHLORIDE 9 MG/ML
INJECTION, SOLUTION INTRAVENOUS CONTINUOUS
Status: DISCONTINUED | OUTPATIENT
Start: 2021-03-19 | End: 2021-03-21 | Stop reason: HOSPADM

## 2021-03-19 RX ORDER — MIDAZOLAM HYDROCHLORIDE 1 MG/ML
INJECTION INTRAMUSCULAR; INTRAVENOUS PRN
Status: DISCONTINUED | OUTPATIENT
Start: 2021-03-19 | End: 2021-03-19 | Stop reason: SDUPTHER

## 2021-03-19 RX ORDER — ALBUTEROL SULFATE 90 UG/1
2 AEROSOL, METERED RESPIRATORY (INHALATION) EVERY 6 HOURS PRN
Status: DISCONTINUED | OUTPATIENT
Start: 2021-03-19 | End: 2021-03-21 | Stop reason: HOSPADM

## 2021-03-19 RX ORDER — PHENYLEPHRINE HYDROCHLORIDE 10 MG/ML
INJECTION INTRAVENOUS PRN
Status: DISCONTINUED | OUTPATIENT
Start: 2021-03-19 | End: 2021-03-19 | Stop reason: SDUPTHER

## 2021-03-19 RX ORDER — SENNA AND DOCUSATE SODIUM 50; 8.6 MG/1; MG/1
1 TABLET, FILM COATED ORAL 2 TIMES DAILY
Status: DISCONTINUED | OUTPATIENT
Start: 2021-03-19 | End: 2021-03-21 | Stop reason: HOSPADM

## 2021-03-19 RX ORDER — SODIUM CHLORIDE 0.9 % (FLUSH) 0.9 %
10 SYRINGE (ML) INJECTION EVERY 12 HOURS SCHEDULED
Status: DISCONTINUED | OUTPATIENT
Start: 2021-03-19 | End: 2021-03-21 | Stop reason: HOSPADM

## 2021-03-19 RX ORDER — ROCURONIUM BROMIDE 10 MG/ML
INJECTION, SOLUTION INTRAVENOUS PRN
Status: DISCONTINUED | OUTPATIENT
Start: 2021-03-19 | End: 2021-03-19 | Stop reason: SDUPTHER

## 2021-03-19 RX ORDER — NEOSTIGMINE METHYLSULFATE 5 MG/5 ML
SYRINGE (ML) INTRAVENOUS PRN
Status: DISCONTINUED | OUTPATIENT
Start: 2021-03-19 | End: 2021-03-19 | Stop reason: SDUPTHER

## 2021-03-19 RX ORDER — FENTANYL CITRATE 50 UG/ML
50 INJECTION, SOLUTION INTRAMUSCULAR; INTRAVENOUS EVERY 5 MIN PRN
Status: DISCONTINUED | OUTPATIENT
Start: 2021-03-19 | End: 2021-03-19 | Stop reason: HOSPADM

## 2021-03-19 RX ORDER — TROSPIUM CHLORIDE 20 MG/1
20 TABLET, FILM COATED ORAL
Status: DISCONTINUED | OUTPATIENT
Start: 2021-03-20 | End: 2021-03-21 | Stop reason: HOSPADM

## 2021-03-19 RX ORDER — DOXEPIN HYDROCHLORIDE 50 MG/1
100 CAPSULE ORAL NIGHTLY PRN
Status: DISCONTINUED | OUTPATIENT
Start: 2021-03-19 | End: 2021-03-21 | Stop reason: HOSPADM

## 2021-03-19 RX ADMIN — FAMOTIDINE 20 MG: 20 TABLET, FILM COATED ORAL at 20:52

## 2021-03-19 RX ADMIN — LURASIDONE HYDROCHLORIDE 60 MG: 40 TABLET, FILM COATED ORAL at 20:56

## 2021-03-19 RX ADMIN — OXYBUTYNIN CHLORIDE 5 MG: 5 TABLET, EXTENDED RELEASE ORAL at 20:58

## 2021-03-19 RX ADMIN — PROPOFOL 150 MG: 10 INJECTION, EMULSION INTRAVENOUS at 11:15

## 2021-03-19 RX ADMIN — CEFAZOLIN 2000 MG: 10 INJECTION, POWDER, FOR SOLUTION INTRAVENOUS at 20:51

## 2021-03-19 RX ADMIN — SODIUM CHLORIDE: 9 INJECTION, SOLUTION INTRAVENOUS at 10:37

## 2021-03-19 RX ADMIN — PROPOFOL 50 MG: 10 INJECTION, EMULSION INTRAVENOUS at 11:36

## 2021-03-19 RX ADMIN — FENTANYL CITRATE 50 MCG: 50 INJECTION, SOLUTION INTRAMUSCULAR; INTRAVENOUS at 11:43

## 2021-03-19 RX ADMIN — FENTANYL CITRATE 25 MCG: 50 INJECTION, SOLUTION INTRAMUSCULAR; INTRAVENOUS at 13:48

## 2021-03-19 RX ADMIN — Medication 3 MG: at 13:22

## 2021-03-19 RX ADMIN — FENTANYL CITRATE 25 MCG: 50 INJECTION, SOLUTION INTRAMUSCULAR; INTRAVENOUS at 13:29

## 2021-03-19 RX ADMIN — ROCURONIUM BROMIDE 10 MG: 10 INJECTION INTRAVENOUS at 11:58

## 2021-03-19 RX ADMIN — PHENYLEPHRINE HYDROCHLORIDE 100 MCG: 10 INJECTION INTRAVENOUS at 12:11

## 2021-03-19 RX ADMIN — ROCURONIUM BROMIDE 10 MG: 10 INJECTION INTRAVENOUS at 12:27

## 2021-03-19 RX ADMIN — FENTANYL CITRATE 50 MCG: 50 INJECTION, SOLUTION INTRAMUSCULAR; INTRAVENOUS at 11:35

## 2021-03-19 RX ADMIN — SODIUM CHLORIDE: 9 INJECTION, SOLUTION INTRAVENOUS at 20:45

## 2021-03-19 RX ADMIN — MIDAZOLAM HYDROCHLORIDE 2 MG: 1 INJECTION, SOLUTION INTRAMUSCULAR; INTRAVENOUS at 11:10

## 2021-03-19 RX ADMIN — PHENYLEPHRINE HYDROCHLORIDE 200 MCG: 10 INJECTION INTRAVENOUS at 11:50

## 2021-03-19 RX ADMIN — Medication 5 MG: at 20:56

## 2021-03-19 RX ADMIN — MORPHINE SULFATE 2 MG: 2 INJECTION, SOLUTION INTRAMUSCULAR; INTRAVENOUS at 14:25

## 2021-03-19 RX ADMIN — BENZONATATE 100 MG: 100 CAPSULE ORAL at 20:57

## 2021-03-19 RX ADMIN — DOCUSATE SODIUM 50 MG AND SENNOSIDES 8.6 MG 1 TABLET: 8.6; 5 TABLET, FILM COATED ORAL at 21:02

## 2021-03-19 RX ADMIN — DEXAMETHASONE SODIUM PHOSPHATE 8 MG: 10 INJECTION, EMULSION INTRAMUSCULAR; INTRAVENOUS at 11:20

## 2021-03-19 RX ADMIN — CYCLOBENZAPRINE 10 MG: 10 TABLET, FILM COATED ORAL at 14:15

## 2021-03-19 RX ADMIN — MIRTAZAPINE 30 MG: 30 TABLET, FILM COATED ORAL at 20:57

## 2021-03-19 RX ADMIN — IPRATROPIUM BROMIDE AND ALBUTEROL SULFATE 1 AMPULE: .5; 3 SOLUTION RESPIRATORY (INHALATION) at 20:44

## 2021-03-19 RX ADMIN — PHENYLEPHRINE HYDROCHLORIDE 100 MCG: 10 INJECTION INTRAVENOUS at 11:56

## 2021-03-19 RX ADMIN — PHENYLEPHRINE HYDROCHLORIDE 100 MCG: 10 INJECTION INTRAVENOUS at 11:24

## 2021-03-19 RX ADMIN — PHENYLEPHRINE HYDROCHLORIDE 100 MCG: 10 INJECTION INTRAVENOUS at 12:33

## 2021-03-19 RX ADMIN — PHENYLEPHRINE HYDROCHLORIDE 100 MCG: 10 INJECTION INTRAVENOUS at 12:14

## 2021-03-19 RX ADMIN — PHENYLEPHRINE HYDROCHLORIDE 100 MCG: 10 INJECTION INTRAVENOUS at 12:57

## 2021-03-19 RX ADMIN — MORPHINE SULFATE 2 MG: 2 INJECTION, SOLUTION INTRAMUSCULAR; INTRAVENOUS at 14:05

## 2021-03-19 RX ADMIN — FENTANYL CITRATE 50 MCG: 50 INJECTION, SOLUTION INTRAMUSCULAR; INTRAVENOUS at 14:00

## 2021-03-19 RX ADMIN — FENTANYL CITRATE 50 MCG: 50 INJECTION, SOLUTION INTRAMUSCULAR; INTRAVENOUS at 13:17

## 2021-03-19 RX ADMIN — CYCLOBENZAPRINE 10 MG: 10 TABLET, FILM COATED ORAL at 20:45

## 2021-03-19 RX ADMIN — PHENYLEPHRINE HYDROCHLORIDE 100 MCG: 10 INJECTION INTRAVENOUS at 11:27

## 2021-03-19 RX ADMIN — POLYETHYLENE GLYCOL 3350 17 G: 17 POWDER, FOR SOLUTION ORAL at 21:02

## 2021-03-19 RX ADMIN — DIPHENHYDRAMINE HYDROCHLORIDE 12.5 MG: 50 INJECTION, SOLUTION INTRAMUSCULAR; INTRAVENOUS at 11:20

## 2021-03-19 RX ADMIN — OXYCODONE HYDROCHLORIDE AND ACETAMINOPHEN 1 TABLET: 5; 325 TABLET ORAL at 16:31

## 2021-03-19 RX ADMIN — BICTEGRAVIR SODIUM, EMTRICITABINE, AND TENOFOVIR ALAFENAMIDE FUMARATE 1 TABLET: 50; 200; 25 TABLET ORAL at 22:33

## 2021-03-19 RX ADMIN — IPRATROPIUM BROMIDE AND ALBUTEROL SULFATE 1 AMPULE: .5; 3 SOLUTION RESPIRATORY (INHALATION) at 10:02

## 2021-03-19 RX ADMIN — ROCURONIUM BROMIDE 10 MG: 10 INJECTION INTRAVENOUS at 11:43

## 2021-03-19 RX ADMIN — Medication 60 MG: at 11:15

## 2021-03-19 RX ADMIN — PROPOFOL 50 MG: 10 INJECTION, EMULSION INTRAVENOUS at 12:26

## 2021-03-19 RX ADMIN — IPRATROPIUM BROMIDE AND ALBUTEROL SULFATE 1 AMPULE: 2.5; .5 SOLUTION RESPIRATORY (INHALATION) at 14:25

## 2021-03-19 RX ADMIN — ROCURONIUM BROMIDE 40 MG: 10 INJECTION INTRAVENOUS at 11:15

## 2021-03-19 RX ADMIN — FENTANYL CITRATE 50 MCG: 50 INJECTION, SOLUTION INTRAMUSCULAR; INTRAVENOUS at 14:05

## 2021-03-19 RX ADMIN — CEFAZOLIN 2000 MG: 10 INJECTION, POWDER, FOR SOLUTION INTRAVENOUS at 11:45

## 2021-03-19 RX ADMIN — Medication 0.4 MG: at 13:22

## 2021-03-19 RX ADMIN — PHENYLEPHRINE HYDROCHLORIDE 200 MCG: 10 INJECTION INTRAVENOUS at 11:48

## 2021-03-19 RX ADMIN — FENTANYL CITRATE 50 MCG: 50 INJECTION, SOLUTION INTRAMUSCULAR; INTRAVENOUS at 14:25

## 2021-03-19 RX ADMIN — GLYCOPYRROLATE AND FORMOTEROL FUMARATE 2 PUFF: 9; 4.8 AEROSOL, METERED RESPIRATORY (INHALATION) at 20:44

## 2021-03-19 RX ADMIN — ONDANSETRON HYDROCHLORIDE 4 MG: 4 INJECTION, SOLUTION INTRAMUSCULAR; INTRAVENOUS at 13:08

## 2021-03-19 RX ADMIN — OXYCODONE HYDROCHLORIDE AND ACETAMINOPHEN 1 TABLET: 5; 325 TABLET ORAL at 17:17

## 2021-03-19 RX ADMIN — ONDANSETRON HYDROCHLORIDE 4 MG: 4 INJECTION, SOLUTION INTRAMUSCULAR; INTRAVENOUS at 11:20

## 2021-03-19 RX ADMIN — SODIUM CHLORIDE: 9 INJECTION, SOLUTION INTRAVENOUS at 11:55

## 2021-03-19 RX ADMIN — IPRATROPIUM BROMIDE AND ALBUTEROL SULFATE 1 AMPULE: .5; 3 SOLUTION RESPIRATORY (INHALATION) at 14:25

## 2021-03-19 ASSESSMENT — PULMONARY FUNCTION TESTS
PIF_VALUE: 19
PIF_VALUE: 24
PIF_VALUE: 29
PIF_VALUE: 25
PIF_VALUE: 24
PIF_VALUE: 19
PIF_VALUE: 28
PIF_VALUE: 21
PIF_VALUE: 5
PIF_VALUE: 25
PIF_VALUE: 16
PIF_VALUE: 2
PIF_VALUE: 22
PIF_VALUE: 25
PIF_VALUE: 25
PIF_VALUE: 19
PIF_VALUE: 27
PIF_VALUE: 25
PIF_VALUE: 24
PIF_VALUE: 23
PIF_VALUE: 24
PIF_VALUE: 19
PIF_VALUE: 24
PIF_VALUE: 24
PIF_VALUE: 3
PIF_VALUE: 16
PIF_VALUE: 17
PIF_VALUE: 26
PIF_VALUE: 22
PIF_VALUE: 31
PIF_VALUE: 26
PIF_VALUE: 19
PIF_VALUE: 21
PIF_VALUE: 19
PIF_VALUE: 25
PIF_VALUE: 26
PIF_VALUE: 25
PIF_VALUE: 19
PIF_VALUE: 24
PIF_VALUE: 27
PIF_VALUE: 23
PIF_VALUE: 24
PIF_VALUE: 24
PIF_VALUE: 22
PIF_VALUE: 1
PIF_VALUE: 23
PIF_VALUE: 23
PIF_VALUE: 21
PIF_VALUE: 22
PIF_VALUE: 23
PIF_VALUE: 23
PIF_VALUE: 22
PIF_VALUE: 23
PIF_VALUE: 7
PIF_VALUE: 19
PIF_VALUE: 24
PIF_VALUE: 16
PIF_VALUE: 24
PIF_VALUE: 26
PIF_VALUE: 28
PIF_VALUE: 23
PIF_VALUE: 25
PIF_VALUE: 24
PIF_VALUE: 19
PIF_VALUE: 19
PIF_VALUE: 25
PIF_VALUE: 32
PIF_VALUE: 25
PIF_VALUE: 23
PIF_VALUE: 23
PIF_VALUE: 1
PIF_VALUE: 30
PIF_VALUE: 27
PIF_VALUE: 26
PIF_VALUE: 24
PIF_VALUE: 27
PIF_VALUE: 25
PIF_VALUE: 5
PIF_VALUE: 24
PIF_VALUE: 27
PIF_VALUE: 19
PIF_VALUE: 27
PIF_VALUE: 27
PIF_VALUE: 25

## 2021-03-19 ASSESSMENT — PAIN DESCRIPTION - LOCATION
LOCATION: BACK
LOCATION: BACK

## 2021-03-19 ASSESSMENT — PAIN SCALES - GENERAL
PAINLEVEL_OUTOF10: 9
PAINLEVEL_OUTOF10: 8
PAINLEVEL_OUTOF10: 8
PAINLEVEL_OUTOF10: 10
PAINLEVEL_OUTOF10: 9
PAINLEVEL_OUTOF10: 10
PAINLEVEL_OUTOF10: 8

## 2021-03-19 ASSESSMENT — PAIN DESCRIPTION - DESCRIPTORS
DESCRIPTORS: DISCOMFORT
DESCRIPTORS: CONSTANT

## 2021-03-19 ASSESSMENT — PAIN DESCRIPTION - PAIN TYPE
TYPE: SURGICAL PAIN
TYPE: SURGICAL PAIN

## 2021-03-19 ASSESSMENT — PAIN DESCRIPTION - ORIENTATION: ORIENTATION: LOWER

## 2021-03-19 ASSESSMENT — PAIN DESCRIPTION - PROGRESSION: CLINICAL_PROGRESSION: GRADUALLY IMPROVING

## 2021-03-19 NOTE — OP NOTE
Operative Note      Patient: Steve Driver  YOB: 1960  MRN: [id number]                    ACCOUNT NO: [de-identified]                               ROOM: 012  ADMISSION DATE: 3/19/2021    PROVIDER:  Pia Soto. Loi Hogan M.D.     DATE OF PROCEDURE: 3/19/2021     PREOPERATIVE DIAGNOSES:  1. L3-S1 degenerative disc disease. 2.  L3-S1 lumbar stenosis with neurogenic claudication  3. L3-S1 herniated nucleus pulposus  4. L3-4 spondylolisthesis, grade 1  5. Obesity, BMI 31.07     POSTOPERATIVE DIAGNOSES:  1. L3-S1 degenerative disc disease. 2.  L3-S1 lumbar stenosis with neurogenic claudication  3. L3-S1 herniated nucleus pulposus  4. L3-4 spondylolisthesis, grade 1  5. Obesity, BMI 31.07     OPERATION PERFORMED:  1. L3-S1 bilateral laminectomy, partial medial facetectomies and foraminotomies of L3, L4, L5, and S1 nerve roots  2. L3-5 posterior spinal fusion. 3.  L3-5 posterior spinal instrumentation, [place], [place]  4. Use of local autograft bone and crushed cancellous chips     SURGEON: Mateo Yoder MD    ASSISTANT:  Aftab Mueller PA-C  [facility], assisted throughout the procedure with positioning, draping, retraction, wound closure, and dressing application. ANESTHESIA:  General.     INDICATIONS:  This is a 61 y.o. female with refractory back and bilateral leg pain with numbness and tingling from degenerative disc disease and lumbar stenosis from L3-S1 and degenerative spondylolisthesis at L3-4. Patient has failed full conservative treatment. Due to the persistence of symptoms and reduction in the ADLs, patient elected surgical treatment. Patient, therefore, understood indications for the surgery as well as its risks, benefits, and alternatives. These risks include but are not limited to paralysis, infection, hematoma, dural tear, nerve root injury, nonunion, DVT/PE, stroke, MI, death etc.  All questions were answered. Informed consent was obtained.      OPERATIVE PROCEDURE:  The patient was taken to the operating room by the Anesthesiology Service and had satisfactory general anesthesia. A first-generation cephalosporin was given within 1 hour of surgical incision. 2 gm of cefazolin was given IV. Venous thromboembolic prophylaxis was performed with sequential devices. The patient was then positioned prone on a standard OSI frame with the abdomen hanging free and all bony prominences well padded. The low back was then prepped and draped in its entirety in the usual sterile fashion. Before incision, a formal timeout was taken per protocol. We next took a midline longitudinal approach and performed subperiosteal dissection out to the tips of the transverse processes of L3, L4 and L5 and medial facet joint L5-S1. Intraoperative localization of level was confirmed using anatomic landmarks. We then began the laminectomy as well as decompression by removing the spinous process of L3, L4 and L5. We entered the spinal canal, resecting the ligamentum flavum in its entirety over this region. Patient had significant stenosis in the lateral recess and neural foramina. Partial medial facetectomy was then performed with an osteotome to get lateral to the facet overhang, but just medial to the pedicles and nerve roots. Kerrison's were then used to perform foraminotomies of the L3, L4, L5, and S1 nerve roots bilaterally. In this way, we completed decompression at L3-4, L4-5 and L5-S1 with foraminotomies of the L3, L4, L5, and S1 nerve roots bilaterally. A small poke hole durotomy occurred during decompression. Fibular and DuraSeal applied over top. Satisfied with this, we then turned our attention to perform spinal instrumentation and posterolateral fusion. Using anatomic landmarks and guided by direct visualization of the pedicles from within the canal, pedicle screws were placed bilaterally at L3, L4 and L5.   All the screws were completely interosseous as determined by bony palpation. Before the screws were inserted, the transverse processes were decorticated with a high-speed bur at L3, L4 and L5. Local autograft bone combined with crushed cancellous chips was placed over the decorticated elements bilaterally from L3-5. The screws were then inserted which were under tapped by 1 mm. Two rods were placed from L3-5, set screws engaged and tightened down to their final torque. Everything was tightened down. A very rigid construct was achieved. Final x-ray was taken, demonstrated good position of the spine and all of the implants. Satisfied with this, we then achieved hemostasis. We then copiously irrigated the wound. We then inserted 2 Hemovac drains through a separate stab incision. The wound was then closed in layer with interrupted 1 and 2-0 Vicryl sutures. A 3-0 Monocryl was used for the skin. The skin edges were sealed with Dermabond. A dry sterile dressing was applied. The patient was then returned to the hospital bed, extubated, and taken to the recovery room in stable condition. Spinal cord monitoring remained stable throughout the operation. Implants:  Implant Name Type Inv. Item Serial No.  Lot No. LRB No. Used Action   GLORIA-TISSUE CANCELLOUS 30. [address]  - [id number] Bone/Graft/Tissue/Human/Synth GLORIA-TISSUE CANCELLOUS 30. [address]  [id number] COMMUNITY TISSUE SVCS-PMM  N/A 1 Implanted   SCREW SPNL L45MM DIA6. 5MM PEDCL POLYAX 2 LD THRD TOP LD FOR  SCREW SPNL L45MM DIA6. 5MM PEDCL POLYAX 2 LD THRD TOP LD FOR  [employer]-WD  N/A 4 Implanted   SCREW SPNL L40MM DIA6. 5MM PEDCL POLYAX 2 LD THRD TOP LD FOR  SCREW SPNL L40MM DIA6. 5MM PEDCL POLYAX 2 LD THRD TOP LD FOR  [employer]-WD  N/A 2 Implanted   SET SCR SPNL POLYAX ATR EVEREST  SET SCR SPNL POLYAX ATR EVEREST  [employer]-WD  N/A 6 Implanted   SHAUNA SPNL CONTOURED 5.5X70 MM LUMBAR TI CAREY  SHAUNA SPNL CONTOURED 5.5X70 MM LUMBAR TI CAREY  KAREN SPINE HOWM-WD  N/A 2 Implanted         Drains:

## 2021-03-19 NOTE — BRIEF OP NOTE
Brief Postoperative Note      Patient: Lindell Cheadle  YOB: 1960  MRN: [id number]    Date of Procedure: 3/19/2021    Pre-Op Diagnosis:   1) L3-5 degenerative disc disease and disc herniation with stenosis and neurogenic claudication  2) L3-4 spondylolisthesis, grade 1    Post-Op Diagnosis: Same       Procedure: L3-S1 decompression and L3-5 fusion    Surgeon(s):  Gadiel Zapata MD    Assistant:  Physician Assistant: AKIL Tompkins    Anesthesia: General    Estimated Blood Loss (mL): 911 mL    Complications: Durotomy    Specimens:   * No specimens in log *    Implants:  Implant Name Type Inv. Item Serial No.  Lot No. LRB No. Used Action   GLORIA-TISSUE CANCELLOUS 30. [address]  - [id number] Bone/Graft/Tissue/Human/Synth GLORIA-TISSUE CANCELLOUS 30. [address] [id number] COMMUNITY TISSUE SVCS-PM  N/A 1 Implanted   SCREW SPNL L45MM DIA6. 5MM PEDCL POLYAX 2 LD THRD TOP LD FOR  SCREW SPNL L45MM DIA6. 5MM PEDCL POLYAX 2 LD THRD TOP LD FOR  [employer]-WD  N/A 4 Implanted   SCREW SPNL L40MM DIA6. 5MM PEDCL POLYAX 2 LD THRD TOP LD FOR  SCREW SPNL L40MM DIA6. 5MM PEDCL POLYAX 2 LD THRD TOP LD FOR  [employer]-WD  N/A 2 Implanted   SET SCR SPNL POLYAX ATR EVEREST  SET SCR SPNL POLYAX ATR EVEREST  [employer] INC-WD  N/A 6 Implanted   SHAUNA SPNL CONTOURED 5.5X70 MM LUMBAR TI CAREY  SHAUNA SPNL CONTOURED 5.5X70 MM LUMBAR TI CAREY  KAREN SPINE HOWM-WD  N/A 2 Implanted         Drains:   Closed/Suction Drain Inferior; Left Back Accordion (Active)       Closed/Suction Drain Inferior;Right Back (Active)       Findings: Stenosis    Electronically signed by AKIL Tompkins on 3/19/2021 at 1:36 PM

## 2021-03-19 NOTE — ANESTHESIA POSTPROCEDURE EVALUATION
Department of Anesthesiology  Postprocedure Note    Patient: Cecelia Taylor  MRN: [id number]  YOB: 1960  Date of evaluation: 3/19/2021  Time:  2:29 PM     Procedure Summary     Date: 03/19/21 Room / Location: [facility] OR  / [facility] OR    Anesthesia Start: 1110 Anesthesia Stop: 1350    Procedure: L3-5 DECOMPRESSION, L3-4 POSTERIOR FUSION (N/A Spine Lumbar) Diagnosis: (LUMBAR SPINAL STENOSIS)    Surgeons: Carisa Case MD Responsible Provider: Jill Hernandez MD    Anesthesia Type: general ASA Status: 3          Anesthesia Type: general    Eugenia Phase I: Eugenia Score: 8    Eugenia Phase II:      Last vitals: Reviewed and per EMR flowsheets.        Anesthesia Post Evaluation    Patient location during evaluation: PACU  Patient participation: complete - patient participated  Level of consciousness: awake  Airway patency: patent  Nausea & Vomiting: no vomiting and no nausea  Complications: no  Cardiovascular status: hemodynamically stable  Respiratory status: acceptable and nasal cannula  Hydration status: stable

## 2021-03-19 NOTE — PROGRESS NOTES
Pt is restless moving about in bed. States sh is just trying to get comfortable. Pt uses a 9/10 for pain now. Pt states she was 10/10 prior to surgery. Pt states she pain is feeling more manageable now. Vira Libman
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
## 2021-03-20 LAB
ANION GAP SERPL CALCULATED.3IONS-SCNC: 10 MEQ/L (ref 8–16)
BUN BLDV-MCNC: 11 MG/DL (ref 7–22)
CALCIUM SERPL-MCNC: 8.2 MG/DL (ref 8.5–10.5)
CHLORIDE BLD-SCNC: 106 MEQ/L (ref 98–111)
CO2: 22 MEQ/L (ref 23–33)
CREAT SERPL-MCNC: 0.8 MG/DL (ref 0.4–1.2)
GFR SERPL CREATININE-BSD FRML MDRD: 73 ML/MIN/1.73M2
GLUCOSE BLD-MCNC: 116 MG/DL (ref 70–108)
HCT VFR BLD CALC: 34.4 % (ref 37–47)
HEMOGLOBIN: 10.6 GM/DL (ref 12–16)
POTASSIUM SERPL-SCNC: 4.5 MEQ/L (ref 3.5–5.2)
SODIUM BLD-SCNC: 138 MEQ/L (ref 135–145)

## 2021-03-20 PROCEDURE — 2580000003 HC RX 258: Performed by: PHYSICIAN ASSISTANT

## 2021-03-20 PROCEDURE — 94760 N-INVAS EAR/PLS OXIMETRY 1: CPT

## 2021-03-20 PROCEDURE — 94640 AIRWAY INHALATION TREATMENT: CPT

## 2021-03-20 PROCEDURE — 6370000000 HC RX 637 (ALT 250 FOR IP): Performed by: PHYSICIAN ASSISTANT

## 2021-03-20 PROCEDURE — 80048 BASIC METABOLIC PNL TOTAL CA: CPT

## 2021-03-20 PROCEDURE — 85014 HEMATOCRIT: CPT

## 2021-03-20 PROCEDURE — 97116 GAIT TRAINING THERAPY: CPT

## 2021-03-20 PROCEDURE — 85018 HEMOGLOBIN: CPT

## 2021-03-20 PROCEDURE — 97535 SELF CARE MNGMENT TRAINING: CPT

## 2021-03-20 PROCEDURE — 97161 PT EVAL LOW COMPLEX 20 MIN: CPT

## 2021-03-20 PROCEDURE — 1200000000 HC SEMI PRIVATE

## 2021-03-20 PROCEDURE — 36415 COLL VENOUS BLD VENIPUNCTURE: CPT

## 2021-03-20 PROCEDURE — 6360000002 HC RX W HCPCS: Performed by: PHYSICIAN ASSISTANT

## 2021-03-20 PROCEDURE — 97165 OT EVAL LOW COMPLEX 30 MIN: CPT

## 2021-03-20 RX ADMIN — OXYCODONE HYDROCHLORIDE AND ACETAMINOPHEN 2 TABLET: 5; 325 TABLET ORAL at 20:18

## 2021-03-20 RX ADMIN — OXYCODONE HYDROCHLORIDE AND ACETAMINOPHEN 2 TABLET: 5; 325 TABLET ORAL at 14:04

## 2021-03-20 RX ADMIN — OXYBUTYNIN CHLORIDE 5 MG: 5 TABLET, EXTENDED RELEASE ORAL at 08:53

## 2021-03-20 RX ADMIN — SODIUM CHLORIDE: 9 INJECTION, SOLUTION INTRAVENOUS at 14:05

## 2021-03-20 RX ADMIN — Medication 5 MG: at 08:53

## 2021-03-20 RX ADMIN — FAMOTIDINE 20 MG: 20 TABLET, FILM COATED ORAL at 08:53

## 2021-03-20 RX ADMIN — DOCUSATE SODIUM 50 MG AND SENNOSIDES 8.6 MG 1 TABLET: 8.6; 5 TABLET, FILM COATED ORAL at 08:53

## 2021-03-20 RX ADMIN — IPRATROPIUM BROMIDE AND ALBUTEROL SULFATE 1 AMPULE: .5; 3 SOLUTION RESPIRATORY (INHALATION) at 20:39

## 2021-03-20 RX ADMIN — IPRATROPIUM BROMIDE AND ALBUTEROL SULFATE 1 AMPULE: .5; 3 SOLUTION RESPIRATORY (INHALATION) at 08:00

## 2021-03-20 RX ADMIN — CYCLOBENZAPRINE 10 MG: 10 TABLET, FILM COATED ORAL at 14:04

## 2021-03-20 RX ADMIN — IPRATROPIUM BROMIDE AND ALBUTEROL SULFATE 1 AMPULE: .5; 3 SOLUTION RESPIRATORY (INHALATION) at 11:45

## 2021-03-20 RX ADMIN — IPRATROPIUM BROMIDE AND ALBUTEROL SULFATE 1 AMPULE: .5; 3 SOLUTION RESPIRATORY (INHALATION) at 15:18

## 2021-03-20 RX ADMIN — DOCUSATE SODIUM 50 MG AND SENNOSIDES 8.6 MG 1 TABLET: 8.6; 5 TABLET, FILM COATED ORAL at 20:18

## 2021-03-20 RX ADMIN — TROSPIUM CHLORIDE 20 MG: 20 TABLET, FILM COATED ORAL at 15:49

## 2021-03-20 RX ADMIN — GLYCOPYRROLATE AND FORMOTEROL FUMARATE 2 PUFF: 9; 4.8 AEROSOL, METERED RESPIRATORY (INHALATION) at 20:48

## 2021-03-20 RX ADMIN — DOXEPIN HYDROCHLORIDE 100 MG: 50 CAPSULE ORAL at 20:18

## 2021-03-20 RX ADMIN — GLYCOPYRROLATE AND FORMOTEROL FUMARATE 2 PUFF: 9; 4.8 AEROSOL, METERED RESPIRATORY (INHALATION) at 08:00

## 2021-03-20 RX ADMIN — CEFAZOLIN 2000 MG: 10 INJECTION, POWDER, FOR SOLUTION INTRAVENOUS at 03:46

## 2021-03-20 RX ADMIN — SODIUM CHLORIDE: 9 INJECTION, SOLUTION INTRAVENOUS at 06:19

## 2021-03-20 RX ADMIN — TROSPIUM CHLORIDE 20 MG: 20 TABLET, FILM COATED ORAL at 06:19

## 2021-03-20 RX ADMIN — LURASIDONE HYDROCHLORIDE 60 MG: 40 TABLET, FILM COATED ORAL at 08:53

## 2021-03-20 RX ADMIN — POLYETHYLENE GLYCOL 3350 17 G: 17 POWDER, FOR SOLUTION ORAL at 08:57

## 2021-03-20 RX ADMIN — BUPROPION HYDROCHLORIDE 150 MG: 150 TABLET, EXTENDED RELEASE ORAL at 08:53

## 2021-03-20 RX ADMIN — BICTEGRAVIR SODIUM, EMTRICITABINE, AND TENOFOVIR ALAFENAMIDE FUMARATE 1 TABLET: 50; 200; 25 TABLET ORAL at 08:53

## 2021-03-20 RX ADMIN — OXYCODONE HYDROCHLORIDE AND ACETAMINOPHEN 2 TABLET: 5; 325 TABLET ORAL at 03:51

## 2021-03-20 RX ADMIN — BISACODYL 10 MG: 10 SUPPOSITORY RECTAL at 20:18

## 2021-03-20 ASSESSMENT — PAIN SCALES - GENERAL
PAINLEVEL_OUTOF10: 8
PAINLEVEL_OUTOF10: 9
PAINLEVEL_OUTOF10: 8
PAINLEVEL_OUTOF10: 9
PAINLEVEL_OUTOF10: 9

## 2021-03-20 ASSESSMENT — PAIN DESCRIPTION - LOCATION
LOCATION: BACK

## 2021-03-20 ASSESSMENT — PAIN DESCRIPTION - DESCRIPTORS: DESCRIPTORS: DISCOMFORT

## 2021-03-20 ASSESSMENT — PAIN DESCRIPTION - PAIN TYPE
TYPE: SURGICAL PAIN
TYPE: SURGICAL PAIN

## 2021-03-20 ASSESSMENT — PAIN DESCRIPTION - PROGRESSION: CLINICAL_PROGRESSION: GRADUALLY IMPROVING

## 2021-03-20 ASSESSMENT — PAIN DESCRIPTION - ORIENTATION: ORIENTATION: LOWER

## 2021-03-20 ASSESSMENT — PAIN DESCRIPTION - FREQUENCY: FREQUENCY: CONTINUOUS

## 2021-03-20 ASSESSMENT — PAIN DESCRIPTION - ONSET: ONSET: ON-GOING

## 2021-03-20 NOTE — PROGRESS NOTES
Pt claims that she wears 3 L NC at night (at home). Cannula turned to 3 L and left with pt bedside after aerosol and MDI treatments administered.
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
## 2021-03-20 NOTE — PROGRESS NOTES
Department of Orthopedic Surgery  Spine Service  Attending Progress Note        Subjective:  Patient doing very well, ambulating in halls. Denies leg pain, numbness/tingling in feet improved. She denies headaches. No BM. +flatus. Voiding without issue. Vitals  VITALS:  /64   Pulse 96   Temp 98.5 °F (36.9 °C) (Oral)   Resp 16   Ht 5' 4\" (1.626 m)   Wt 181 lb (82.1 kg)   SpO2 94%   BMI 31.07 kg/m²   24HR INTAKE/OUTPUT:      Intake/Output Summary (Last 24 hours) at 3/20/2021 0839  Last data filed at 3/20/2021 5654  Gross per 24 hour   Intake 5162 ml   Output 2460 ml   Net 2702 ml     URINARY CATHETER OUTPUT (Castañeda):     DRAIN/TUBE OUTPUT:  Closed/Suction Drain Inferior; Left Back Accordion-Output (ml): 60 ml  Closed/Suction Drain Inferior;Right Back-Output (ml): 90 ml      PHYSICAL EXAM:    Orientation:  alert and oriented to person, place and time    Incision:  dressing in place, clean, dry, intact      Lower Extremity Motor :  quadriceps, extensor hallucis longus, dorsiflexion, plantarflexion 5/5 bilaterally  Lower Extremity Sensory:  Intact L1-S1    Flatus:  positive      LABS:    HgB:    Lab Results   Component Value Date    HGB 10.6 03/20/2021     Hemoglobin/Hematocrit:    Lab Results   Component Value Date    HGB 10.6 03/20/2021    HCT 34.4 03/20/2021     BMP:    Lab Results   Component Value Date     03/20/2021    K 4.5 03/20/2021    K 3.9 03/19/2019     03/20/2021    CO2 22 03/20/2021    BUN 11 03/20/2021    LABALBU 3.9 06/15/2020    LABALBU 4.4 06/04/2012    CREATININE 0.8 03/20/2021    CALCIUM 8.2 03/20/2021    LABGLOM 73 03/20/2021    GLUCOSE 116 03/20/2021    GLUCOSE 100 06/04/2012       ASSESSMENT AND PLAN:    Post operative day 1 status post L3-S1 decompression and L3-5 fusion     1:  Monitor labs and drain output (half compression)  2:  Activity Level:  As tolerated. PT/OT.  If patient develops a headache, have her lay flat  3:  Pain Control:  Good  4:  Discharge Planning: Pending BM, HH for drain management     AKIL Lubin

## 2021-03-20 NOTE — PROGRESS NOTES
[address] [address]  INPATIENT PHYSICAL THERAPY  EVALUATION  [facility] ORTHOPEDICS 7K - 7K-13/013-A    Time In: 4707  Time Out: 0710  Timed Code Treatment Minutes: 8 Minutes  Minutes: 25          Date: 3/20/2021  Patient Name: Isaiah Castellon,  Gender:  female        MRN: [id number]  : 1960  (61 y.o.)      Referring Practitioner: London Bird PA-C  Diagnosis: Spinal Stenosis  Additional Pertinent Hx: 80-year-old female who is having constant low back pain that radiates pain, N/T into the bilateral anterior and posterior legs into the feet. Symptoms have been present on a chronic basis and progressively worsening. Current VAS score 9/10. Percentage wise, 50% pain back and 50% into the legs. As it pertains to the legs, 50% left and 50% right. Standing and walking aggravates symptoms. on 3/49O5-G3 decompression and L3-5 fusion     Restrictions/Precautions:  Restrictions/Precautions: General Precautions, Fall Risk  Position Activity Restriction  Spinal Precautions: No Bending, No Lifting, No Twisting    Subjective:  Chart Reviewed: Yes  Patient assessed for rehabilitation services?: Yes  Subjective: Pt resting in bed and reports feeling pretty good. RN approved session. RN reported pt not sleeping much last night. General:  Overall Orientation Status: Within Normal Limits    Vision: Within Functional Limits    Hearing: Within functional limits         Pain: 8/10: low back and B LEs     Vitals: See nursing flow sheet. Social/Functional History:    Lives With: Alone  Type of Home: House  Home Layout: One level  Home Access: [address]: Electric scooter          Receives Help From: Family  ADL Assistance: Independent     Ambulation Assistance: Independent          Additional Comments: Pt has [address] [place] [address] to assist pt at home upon discharge. Pt has a daughter able to assist as well. Pt used power scooter to get around the house prior to surgery.   Pt reports having a rollator at independence with functional mobility for improved independence and quality of life. Assessment: Body structures, Functions, Activity limitations: Decreased functional mobility , Decreased strength, Decreased endurance  Assessment: Pt presents with above dx. Pt motivated about mobility this AM.  Pt gaurded with mobility but overal mobilizing well. Pt with decreased endurance and mild strength deficits. Pt to benefit from continued therapy for functional mobility. Pt will be safe for discharge home with assist as needed. Prognosis: Excellent    REQUIRES PT FOLLOW UP: Yes    Discharge Recommendations:  Discharge Recommendations: Home with assist PRN    Patient Education:  PT Education: Goals, PT Role    Equipment Recommendations:       Plan:  Times per week: 6-7xO  Times per day: Daily  Specific instructions for Next Treatment: ther ex, functional mobility, gait training  Current Treatment Recommendations: Strengthening, ROM, Balance Training, Functional Mobility Training, Transfer Training, Gait Training, Endurance Training, Home Exercise Program    Goals:  Patient goals : Pt wanting to return home and learn exercise. Short term goals  Time Frame for Short term goals: At time of discharge  Short term goal 1: Mod I with bed mobility using log roll technique so pt can get in and out of bed. Short term goal 2: Mod I with t/fs so pt can get up to go to the bathroom  Short term goal 3: Mod I to amb with 'x 1 for household amb. Short term goal 4: Pt to negotiate standard ramp with RW to enter home with Mod I  Long term goals  Time Frame for Long term goals : No LTGs secondary to ELOS    Following session, patient left in safe position with all fall risk precautions in place.

## 2021-03-20 NOTE — PROGRESS NOTES
[address]  INPATIENT OCCUPATIONAL THERAPY  [facility] ORTHOPEDICS 7K  EVALUATION    Time:   Time In: 3449  Time Out: 1001  Timed Code Treatment Minutes: 20 Minutes  Minutes: 30          Date: 3/20/2021  Patient Name: Cinthia Goodwin,   Gender: female      MRN: [id number]  : 1960  (61 y.o.)  Referring Practitioner: AKIL Desouza  Diagnosis: spinal stenosis; L3-L5 deompression, L3-4 fusion  Additional Pertinent Hx: per 3/18/21 H& P; The patient is a 28-year-old female who is having constant low back pain that radiates pain, N/T into the bilateral anterior and posterior legs into the feet. Symptoms have been present on a chronic basis and progressively worsening. Current VAS score 9/10. Percentage wise, 50% pain back and 50% into the legs. As it pertains to the legs, 50% left and 50% right. Standing and walking aggravates symptoms. Rest helps relieve symptoms. No previous spinal surgeries. Current smoker    Restrictions/Precautions:  Restrictions/Precautions: General Precautions, Fall Risk  Position Activity Restriction  Spinal Precautions: No Bending, No Lifting, No Twisting    Subjective  Chart Reviewed: Yes, Orders, Progress Notes, History and Physical, Operative Notes  Patient assessed for rehabilitation services?: Yes  Family / Caregiver Present: No    Subjective: RN approved session. Patient pleasant and coopertaive. Able to recall 2 spinal precautions. A & O x 4.     Pain:  Pain Assessment  Patient Currently in Pain: Yes  Pain Assessment: 0-10  Pain Level: 8  Pain Location: Back    Vitals: Vitals not assessed per clinical judgement, see nursing flowsheet    Social/Functional History:  Lives With: Alone  Type of Home: House  Home Layout: One level  Home Access: Ramped entrance  Home Equipment: Electric scooter, Rolling walker(reports she needs to purchase a 2 w/w)   Bathroom Shower/Tub: Tub/Shower unit  Bathroom Toilet: Standard  Bathroom Equipment: Grab bars in shower(reports she needs a raised toilet seat frame over her low toilet for home)  Bathroom Accessibility: Accessible    Receives Help From: Family, Home health  ADL Assistance: Needs assistance  Homemaking Assistance: Needs assistance  Homemaking Responsibilities: No  Ambulation Assistance: Independent  Transfer Assistance: Independent    Active : No  Patient's  Info: uses bus or cab     Additional Comments: Pt has [facility] care to assist pt at home upon discharge. Pt has a daughter able to assist as well. Pt used power scooter to get around the house prior to surgery. Pt reports having a rollator at home but wants a RW and raised toilet seat at time of discharge. Pt reports wearing 3L02 at night. owns a cat. VISION:WFL    HEARING:  WFL    COGNITION: Impulsive    RANGE OF MOTION:  Right Upper Extremity: WFL  Left Upper Extremity:  WFL    STRENGTH:  Right Upper Extremity: WFL  Left Upper Extremity:  WFL    SENSATION:   WFL    ADL:   Grooming: Stand By Assistance. stood at sink to brush teeth and apply deodorant; abandoning 2 w/w in front of toilet to  front of sink  Bathing: Supervision. stood at sink to wash face  Toileting: Stand By Assistance. able to complete toilet hygiene in sitting wtihin spinal precautions  Toilet Transfer: [employer]. with use of 2 w/w and grab bars for support. BALANCE:  Sitting Balance:  Independent. no LOB during dyn sitting task  Standing Balance: Contact Guard Assistance. due to impulsiveness and assist for IV and drains    BED MOBILITY:  Supine to Sit: Modified Independent with use of bed rails and using log rolling tech    TRANSFERS:  Sit to Stand:  Stand By Assistance. with cues for safety due to impulsive wtih standing prior to making sure IV and drains were in proper place prior to standing     FUNCTIONAL MOBILITY:  Assistive Device: Rolling Walker  Assist Level:  Contact Guard Assistance. Distance:  To and from bathroom  No c/o pain with functional mobility Exercise:  none completed this date    Activity Tolerance:  Patient tolerance of  treatment: good. Did not become SOB, fatigued or c/o pain t/o ADL tasks. Assessment:  Assessment: Patient tolerated evaluation and treatment well. is motivated to participate and return home. required edu in spinal precautions and impact on ADLs. patient would benefit from continued OT to increase ovearll endurance and complete ADLs with (I) and within spinal precations to safely transition to prior living environment. Performance deficits / Impairments: Decreased ADL status, Decreased endurance, Decreased high-level IADLs  Prognosis: Good  REQUIRES OT FOLLOW UP: Yes  Decision Making: Low Complexity    Treatment Initiated: Treatment and education initiated within context of evaluation. Evaluation time included review of current medical information, gathering information related to past medical, social and functional history, completion of standardized testing, formal and informal observation of tasks, assessment of data and development of plan of care and goals. Treatment time included skilled education and facilitation of tasks to increase safety and independence with ADL's for improved functional independence and quality of life. Discharge Recommendations:  Continue to assess pending progress, Patient would benefit from continued therapy after discharge    Patient Education:  OT Education: OT Role, Plan of Care, Precautions, ADL Adaptive Strategies, Transfer Training, Equipment  Patient Education: edu in AE for LB dressing due to living alone and impact spinal precautions has on ADL/IADL tasks t/o day.     Equipment Recommendations:       Plan:  Times per week: 6 x  Times per day: Daily  Current Treatment Recommendations: Endurance Training, Neuromuscular Re-education, Patient/Caregiver Education & Training, Self-Care / ADL, Equipment Evaluation, Education, & procurement, Safety Education & Training  Plan Comment: completes LB dressing tasks within spinal precautions well without AE. continue to assess as gets closer to d/c due to living alone. .  See long-term goal time frame for expected duration of plan of care. If no long-term goals established, a short length of stay is anticipated. Goals:  Patient goals : return home at [facility]  Short term goals  Time Frame for Short term goals: by discharge  Short term goal 1: Patient will tolerate 8 min functional standing with MOD I to increase overall endurance to tolerate ADL routine. Short term goal 2: patient will complete ADL routine wtihin spinal precautions with AE PRN with MOD I with distant (S). Following session, patient left in safe position with all fall risk precautions in place.

## 2021-03-20 NOTE — PROGRESS NOTES
[facility] called and message left.   Pt states she is currently with [facility] so message left about the hemovacs on discharge

## 2021-03-21 VITALS
HEIGHT: 64 IN | OXYGEN SATURATION: 93 % | RESPIRATION RATE: 20 BRPM | WEIGHT: 181 LBS | SYSTOLIC BLOOD PRESSURE: 126 MMHG | TEMPERATURE: 98.2 F | HEART RATE: 87 BPM | BODY MASS INDEX: 30.9 KG/M2 | DIASTOLIC BLOOD PRESSURE: 80 MMHG

## 2021-03-21 LAB
ANION GAP SERPL CALCULATED.3IONS-SCNC: 12 MEQ/L (ref 8–16)
BUN BLDV-MCNC: 9 MG/DL (ref 7–22)
CALCIUM SERPL-MCNC: 8.8 MG/DL (ref 8.5–10.5)
CHLORIDE BLD-SCNC: 101 MEQ/L (ref 98–111)
CO2: 24 MEQ/L (ref 23–33)
CREAT SERPL-MCNC: 0.7 MG/DL (ref 0.4–1.2)
GFR SERPL CREATININE-BSD FRML MDRD: 85 ML/MIN/1.73M2
GLUCOSE BLD-MCNC: 104 MG/DL (ref 70–108)
HCT VFR BLD CALC: 35.7 % (ref 37–47)
HEMOGLOBIN: 11.1 GM/DL (ref 12–16)
POTASSIUM SERPL-SCNC: 4 MEQ/L (ref 3.5–5.2)
SODIUM BLD-SCNC: 137 MEQ/L (ref 135–145)

## 2021-03-21 PROCEDURE — 6370000000 HC RX 637 (ALT 250 FOR IP): Performed by: PHYSICIAN ASSISTANT

## 2021-03-21 PROCEDURE — 94760 N-INVAS EAR/PLS OXIMETRY 1: CPT

## 2021-03-21 PROCEDURE — 36415 COLL VENOUS BLD VENIPUNCTURE: CPT

## 2021-03-21 PROCEDURE — 80048 BASIC METABOLIC PNL TOTAL CA: CPT

## 2021-03-21 PROCEDURE — 85014 HEMATOCRIT: CPT

## 2021-03-21 PROCEDURE — 85018 HEMOGLOBIN: CPT

## 2021-03-21 PROCEDURE — 97530 THERAPEUTIC ACTIVITIES: CPT

## 2021-03-21 PROCEDURE — 2580000003 HC RX 258: Performed by: PHYSICIAN ASSISTANT

## 2021-03-21 PROCEDURE — 94640 AIRWAY INHALATION TREATMENT: CPT

## 2021-03-21 RX ADMIN — IPRATROPIUM BROMIDE AND ALBUTEROL SULFATE 1 AMPULE: .5; 3 SOLUTION RESPIRATORY (INHALATION) at 07:45

## 2021-03-21 RX ADMIN — DICYCLOMINE HYDROCHLORIDE 10 MG: 10 CAPSULE ORAL at 08:41

## 2021-03-21 RX ADMIN — OXYCODONE HYDROCHLORIDE AND ACETAMINOPHEN 2 TABLET: 5; 325 TABLET ORAL at 04:33

## 2021-03-21 RX ADMIN — IPRATROPIUM BROMIDE AND ALBUTEROL SULFATE 1 AMPULE: .5; 3 SOLUTION RESPIRATORY (INHALATION) at 11:20

## 2021-03-21 RX ADMIN — DOXEPIN HYDROCHLORIDE 100 MG: 50 CAPSULE ORAL at 08:41

## 2021-03-21 RX ADMIN — GLYCOPYRROLATE AND FORMOTEROL FUMARATE 2 PUFF: 9; 4.8 AEROSOL, METERED RESPIRATORY (INHALATION) at 07:45

## 2021-03-21 RX ADMIN — FAMOTIDINE 20 MG: 20 TABLET, FILM COATED ORAL at 08:47

## 2021-03-21 RX ADMIN — BUPROPION HYDROCHLORIDE 150 MG: 150 TABLET, EXTENDED RELEASE ORAL at 08:42

## 2021-03-21 RX ADMIN — ALBUTEROL SULFATE 2 PUFF: 90 AEROSOL, METERED RESPIRATORY (INHALATION) at 01:58

## 2021-03-21 RX ADMIN — TROSPIUM CHLORIDE 20 MG: 20 TABLET, FILM COATED ORAL at 05:50

## 2021-03-21 RX ADMIN — CYCLOBENZAPRINE 10 MG: 10 TABLET, FILM COATED ORAL at 01:57

## 2021-03-21 RX ADMIN — OXYBUTYNIN CHLORIDE 5 MG: 5 TABLET, EXTENDED RELEASE ORAL at 08:41

## 2021-03-21 RX ADMIN — BICTEGRAVIR SODIUM, EMTRICITABINE, AND TENOFOVIR ALAFENAMIDE FUMARATE 1 TABLET: 50; 200; 25 TABLET ORAL at 08:40

## 2021-03-21 RX ADMIN — MIRTAZAPINE 30 MG: 30 TABLET, FILM COATED ORAL at 08:42

## 2021-03-21 RX ADMIN — SODIUM CHLORIDE, PRESERVATIVE FREE 10 ML: 5 INJECTION INTRAVENOUS at 08:44

## 2021-03-21 RX ADMIN — LURASIDONE HYDROCHLORIDE 60 MG: 40 TABLET, FILM COATED ORAL at 08:41

## 2021-03-21 NOTE — PROGRESS NOTES
Department of Orthopedic Surgery  Spine Service  Attending Progress Note        Subjective:  POD#2 patient continues to do very well. Ambulating in halls with PT, denies headaches. Denies leg pain, no new N/T. +BM. Hgb 11.1      Vitals  VITALS:  /80   Pulse 87   Temp 98.2 °F (36.8 °C) (Oral)   Resp 20   Ht 5' 4\" (1.626 m)   Wt 181 lb (82.1 kg)   SpO2 93%   BMI 31.07 kg/m²   24HR INTAKE/OUTPUT:      Intake/Output Summary (Last 24 hours) at 3/21/2021 0753  Last data filed at 3/21/2021 5782  Gross per 24 hour   Intake 2552.88 ml   Output 360 ml   Net 2192.88 ml     URINARY CATHETER OUTPUT (Castañeda):     DRAIN/TUBE OUTPUT:  Closed/Suction Drain Inferior; Left Back Accordion-Output (ml): 40 ml  Closed/Suction Drain Inferior;Right Back-Output (ml): 80 ml      PHYSICAL EXAM:    Orientation:  alert and oriented to person, place and time    Incision:  dressing in place, clean, dry, intact      Lower Extremity Motor :  quadriceps, extensor hallucis longus, dorsiflexion, plantarflexion 5/5 bilaterally  Lower Extremity Sensory:  Intact L1-S1    Flatus:  positive      LABS:    HgB:    Lab Results   Component Value Date    HGB 11.1 03/21/2021     Hemoglobin/Hematocrit:    Lab Results   Component Value Date    HGB 11.1 03/21/2021    HCT 35.7 03/21/2021     BMP:    Lab Results   Component Value Date     03/21/2021    K 4.0 03/21/2021    K 3.9 03/19/2019     03/21/2021    CO2 24 03/21/2021    BUN 9 03/21/2021    LABALBU 3.9 06/15/2020    LABALBU 4.4 06/04/2012    CREATININE 0.7 03/21/2021    CALCIUM 8.8 03/21/2021    LABGLOM 85 03/21/2021    GLUCOSE 104 03/21/2021    GLUCOSE 100 06/04/2012       ASSESSMENT AND PLAN:    Post operative day 2 status post L3-S1 decompression and L3-5 fusion     1:  Monitor labs and drain output (half compression)  2:  Activity Level:  As tolerated. PT/OT.  If patient develops a headache, have her lay flat  3:  Pain Control:  Good  4:  Discharge Planning:  Home today w/ HH for drain management     Lyle Song

## 2021-03-21 NOTE — PLAN OF CARE
Problem: Activity:  Goal: Ability to avoid complications of mobility impairment will improve  Description: Ability to avoid complications of mobility impairment will improve  Outcome: Ongoing  Goal: Ability to tolerate increased activity will improve  Description: Ability to tolerate increased activity will improve  Outcome: Ongoing     Problem:  Bowel/Gastric:  Goal: Gastrointestinal status for postoperative course will improve  Description: Gastrointestinal status for postoperative course will improve  Outcome: Ongoing     Problem: Fluid Volume:  Goal: Maintenance of adequate hydration will improve  Description: Maintenance of adequate hydration will improve  Outcome: Ongoing     Problem: Health Behavior:  Goal: Identification of resources available to assist in meeting health care needs will improve  Description: Identification of resources available to assist in meeting health care needs will improve  Outcome: Ongoing  Goal: Ability to state signs and symptoms to report to health care provider will improve  Description: Ability to state signs and symptoms to report to health care provider will improve  Outcome: Ongoing     Problem: Nutritional:  Goal: Ability to attain and maintain optimal nutritional status will improve  Description: Ability to attain and maintain optimal nutritional status will improve  Outcome: Ongoing     Problem: Physical Regulation:  Goal: Ability to maintain clinical measurements within normal limits will improve  Description: Ability to maintain clinical measurements within normal limits will improve  Outcome: Ongoing  Goal: Will remain free from infection  Description: Will remain free from infection  Outcome: Ongoing     Problem: Respiratory:  Goal: Respiratory status will improve  Description: Respiratory status will improve  Outcome: Ongoing     Problem: Sensory:  Goal: Pain level will decrease  Description: Pain level will decrease  Outcome: Ongoing  Goal: Satisfaction with pain management regimen will improve  Description: Satisfaction with pain management regimen will improve  Outcome: Ongoing     Problem: Skin Integrity:  Goal: Risk for impaired skin integrity will decrease  Description: Risk for impaired skin integrity will decrease  Outcome: Ongoing     Problem: Urinary Elimination:  Goal: Ability to achieve and maintain adequate urine output will improve  Description: Ability to achieve and maintain adequate urine output will improve  Outcome: Ongoing     Problem: Musculor/Skeletal Functional Status  Goal: Highest potential functional level  Outcome: Ongoing  Goal: Absence of falls  Outcome: Ongoing     Problem: Pain:  Goal: Pain level will decrease  Description: Pain level will decrease  Outcome: Ongoing  Goal: Control of acute pain  Description: Control of acute pain  Outcome: Ongoing  Goal: Control of chronic pain  Description: Control of chronic pain  Outcome: Ongoing     Problem: Falls - Risk of:  Goal: Will remain free from falls  Description: Will remain free from falls  Outcome: Ongoing  Goal: Absence of physical injury  Description: Absence of physical injury  Outcome: Ongoing

## 2021-03-21 NOTE — PROGRESS NOTES
Spoke with AKIL Penaloza and sharon for pt to be discharged with drain. Per patient she already has [facility], I phoned to confirm that they would be able to see pt today, awaiting return phone call. Pt also reports that she is able to have a [employer] taxi to transport her home and has already been arranged. Will phone to confirm.

## 2021-03-21 NOTE — PROGRESS NOTES
[facility]  INPATIENT PHYSICAL THERAPY  DAILY NOTE  [facility] ORTHOPEDICS 7K - 7K-13/013-A    Time In: 3146  Time Out: 7029  Timed Code Treatment Minutes: 15 Minutes  Minutes: 15          Date: 3/21/2021  Patient Name: Sylvain Odell,  Gender:  female        MRN: [id number]  : 1960  (61 y.o.)     Referring Practitioner: Simon Andrews PA-C  Diagnosis: Spinal Stenosis  Additional Pertinent Hx: 19-year-old female who is having constant low back pain that radiates pain, N/T into the bilateral anterior and posterior legs into the feet. Symptoms have been present on a chronic basis and progressively worsening. Current VAS score 9/10. Percentage wise, 50% pain back and 50% into the legs. As it pertains to the legs, 50% left and 50% right. Standing and walking aggravates symptoms. on 3/24N1-P3 decompression and L3-5 fusion     Prior Level of Function:  Lives With: Alone  Type of Home: House  Home Layout: One level  Home Access: Ramped entrance  Home Equipment: Electric scooter, Rolling walker(reports she needs to purchase a 2 w/w)   Bathroom Shower/Tub: Tub/Shower unit  Bathroom Toilet: Standard  Bathroom Equipment: Grab bars in shower(reports she needs a raised toilet seat frame over her low toilet for home)  Bathroom Accessibility: Accessible    Receives Help From: Family, Home health  ADL Assistance: Needs assistance  Homemaking Assistance: Needs assistance  Homemaking Responsibilities: No  Ambulation Assistance: Independent  Transfer Assistance: Independent  Active : No  Additional Comments: Pt has [address] [place] [address] to assist pt at home upon discharge. Pt has a daughter able to assist as well. Pt used power scooter to get around the house prior to surgery. Pt reports having a rollator at home but wants a RW and raised toilet seat at time of discharge. Pt reports wearing 3L02 at night. owns a cat.     Restrictions/Precautions:  Restrictions/Precautions: General Precautions, Fall Risk  Position Activity Restriction  Spinal Precautions: No Bending, No Lifting, No Twisting     SUBJECTIVE: RN approved therapy session. Patient supine in bed upon PTA arrival with patient request to utilize restroom. Patient returned to bed at end of toileting task with refusal for continued treatment this date. Patient reporting \"maybe I will walk later\". PAIN: 8/10: Patient reports back pain 8/10 with request to return to bed for comfort. Vitals: Vitals not assessed per clinical judgement, see nursing flowsheet    OBJECTIVE:  Bed Mobility:  Rolling to Right: Stand By Assistance, verbal cues for log roll   Supine to Sit: Stand By Assistance    Transfers:  Sit to Stand: Contact Guard Assistance  Stand to [employer] Assistance    Ambulation:  Contact Guard Assistance, assistance required for surgical drain management. Distance: 20 feet x 2  Surface: Level Tile  Device:Rolling Walker  Gait Deviations:  Decreased Step Length Bilaterally and Decreased Heel Strike Bilaterally    Balance:  Dynamic Standing Balance: Contact Guard Assistance, while standing for max-care and hand hygiene. Exercise:  None    Functional Outcome Measures: Not completed       ASSESSMENT:  Assessment: Patient progressing toward established goals. Activity Tolerance:  Patient tolerance of  treatment: fair. Equipment Recommendations:   Discharge Recommendations:  Home with assist PRN    Plan: Times per week: 6-7xO  Times per day: Daily  Specific instructions for Next Treatment: ther ex, functional mobility, gait training  Current Treatment Recommendations: Strengthening, ROM, Balance Training, Functional Mobility Training, Transfer Training, Gait Training, Endurance Training, Home Exercise Program    Patient Education  Patient Education: Plan of Care, Bed Mobility, Transfers,  - Patient Verbalized Understanding    Goals:  Patient goals : Pt wanting to return home and learn exercise. Short term goals  Time Frame for Short term goals:  At time of discharge  Short term goal 1: Mod I with bed mobility using log roll technique so pt can get in and out of bed. Short term goal 2: Mod I with t/fs so pt can get up to go to the bathroom  Short term goal 3: Mod I to amb with 'x 1 for household amb. Short term goal 4: Pt to negotiate standard ramp with RW to enter home with Mod I  Long term goals  Time Frame for Long term goals : No LTGs secondary to ELOS    Following session, patient left in safe position with all fall risk precautions in place.

## 2021-03-22 ENCOUNTER — CARE COORDINATION (OUTPATIENT)
Dept: CASE MANAGEMENT | Age: 61
End: 2021-03-22

## 2021-03-22 ENCOUNTER — TELEPHONE (OUTPATIENT)
Dept: FAMILY MEDICINE CLINIC | Age: 61
End: 2021-03-22

## 2021-03-22 DIAGNOSIS — M48.00 SPINAL STENOSIS, UNSPECIFIED SPINAL REGION: Primary | ICD-10-CM

## 2021-03-22 DIAGNOSIS — K21.9 GASTROESOPHAGEAL REFLUX DISEASE, UNSPECIFIED WHETHER ESOPHAGITIS PRESENT: Primary | ICD-10-CM

## 2021-03-22 RX ORDER — OMEPRAZOLE 20 MG/1
20 CAPSULE, DELAYED RELEASE ORAL
Qty: 30 CAPSULE | Refills: 5 | Status: SHIPPED | OUTPATIENT
Start: 2021-03-22 | End: 2022-08-03 | Stop reason: SDUPTHER

## 2021-03-22 NOTE — TELEPHONE ENCOUNTER
Well unfortunately the Rantidine (Zantac) has been taking off the market. I have sent alternative Rx Omeprazole to the pharmacy.

## 2021-03-22 NOTE — CARE COORDINATION
[facility] Transitions Initial Follow Up Call    Call within 2 business days of discharge: Yes    Patient: Brooklyn Nunez Patient : 1960   MRN: [id number]  Reason for Admission: L3-5 DECOMPRESSION, L3-4 POSTERIOR FUSION  Discharge Date: 3/21/21 RARS: Readmission Risk Score: 14      Last Discharge [facility]       Complaint Diagnosis Description Type Department Provider    3/19/21   Admission (Discharged) Joe Segura MD           Spoke with: Patient      TRANSITION OF CARE CALL: [facility] INITIAL CALL     Charting Summary:     Patient states she is still having back pain - taking percocet & Flexeril with relief. Has dressing on back incision. She states gets [facility] but they didn't look at dressing - she will call them and deal this this. Uses O2 at night only. Denies SOB at this time. Patient has good appetite and is drinking adequate fluids. Normal elimination patterns (Urine and BM). Has BM this AM.    Reviewed medications with patient. Patient confirms they have all medications and are taking medications as directed. Patient has no questions concerning medications at this time. 1111F sent to PCP    Shane states she will make F/U appointment with PCP and surgeon. Patient has no needs or concerns for writer at this time. Patient is aware of when to contact MD with any new or worsening symptoms. Will continue to follow. Chika Aguiar LPN    [phone]  Marcello Nelson / [facility] Coordinator    Advance Care Planning:   Does patient have an Advance Directive:  not on file. Was this a readmission? No  Patient stated reason for admission: Back Pain  Patients top risk factors for readmission: functional physical ability, lack of knowledge about disease, medical condition and medication management      Care Transition Nurse (CTN) contacted the patient by telephone to perform post hospital discharge assessment.      Verified name and  with patient as identifiers. Provided introduction to self, and explanation of the CTN role. CTN reviewed discharge instructions, medical action plan and red flags with patient who verbalized understanding. Patient given an opportunity to ask questions and does not have any further questions or concerns at this time. Were discharge instructions available to patient? Yes. Reviewed appropriate site of care based on symptoms and resources available to patient including: PCP, Specialist, Home health and When to call 911. The patient agrees to contact the PCP office for questions related to their healthcare. Medication reconciliation was performed with patient, who verbalizes understanding of administration of home medications. Advised obtaining a 90-day supply of all daily and as-needed medications. Was patient discharged with a pulse oximeter? No     Discussed follow-up appointments. If no appointment was previously scheduled, appointment scheduling offered: Yes. Is follow up appointment scheduled within 7 days of discharge? No  Non-[facility] follow up appointment(s): Patient very adament that she will call for appointments. Plan for follow-up call in 3-5 days based on severity of symptoms and risk factors. Plan for next call: symptom management-Back Pain & Appointmentw  CTN provided contact information for future needs.       Wendy Reddy LPN    [phone]  Aydin Steve / [address] Transitions 24 Hour Call    Schedule Follow Up Appointment with PCP: Completed  Do you have any ongoing symptoms?: Yes  Patient-reported symptoms: Pain  Do you have a copy of your discharge instructions?: Yes  Do you have all of your prescriptions and are they filled?: Yes  Have you been contacted by a [facility] Pharmacist?: No  Have you scheduled your follow up appointment?: No  Were you discharged with any Home Care or Post Acute Services: Yes  Post Acute Services: Home Health ([facility] [phone])  Patient DME: Kenney Huerta  Patient Home Equipment: Oxygen, CPAP  Do you have support at home?: Alone  Do you feel like you have everything you need to keep you well at home?: Yes  Are you an active caregiver in your home?: No  Care Transitions Interventions  No Identified Needs         Follow Up  Future Appointments   Date Time Provider Bertin Hoffman   6/15/2021  2:00 PM STR CT IMAGING RM1  [facility] EXP STR Radiolog   6/22/2021  1:30 PM JAMEY Mccoy  Ethel    2/4/2022  1:45 PM Luke Wise MD N SRPX Heart P - Agapito Phalen, LPN

## 2021-03-22 NOTE — TELEPHONE ENCOUNTER
Pt called stating the pepcid she is currently taking is not working and is asking for a refill for the rantidine.       Please advise

## 2021-03-22 NOTE — TELEPHONE ENCOUNTER
[facility]    Outreach made within 2 business days of discharge: Yes    Patient: Saniya Nicholas Patient : 1960   MRN: [id number]  Reason for Admission: There are no discharge diagnoses documented for the most recent discharge. Discharge Date: 3/21/21       Spoke with: Giovanni Salinas    Discharge department/facility: [facility]    TCM Interactive Patient Contact:  Was patient able to fill all prescriptions: No: N/A  Was patient instructed to bring all medications to the follow-up visit: Yes  Is patient taking all medications as directed in the discharge summary?  Yes  Does patient understand their discharge instructions: Yes  Does patient have questions or concerns that need addressed prior to 7-14 day follow up office visit: no    Scheduled appointment with PCP within 7-14 days    Follow Up  Future Appointments   Date Time Provider Bertin Hoffman   6/15/2021  2:00 PM STR CT IMAGING RM1  [facility] OUT EXP STR Radiolog   2021  1:30 PM Dariana Salamanca APRN - [address]   2022  1:45 PM Sasha Hernandez MD N [id number] [facility]

## 2021-03-23 NOTE — DISCHARGE SUMMARY
Discharge Summary        Date of Admission: 03/09/2021  Date of Discharge: 03/21/2021    Admitting Physician: Dr. Alicia Sanders: Internal Medicine     DATE OF PROCEDURE: 3/19/2021     PREOPERATIVE DIAGNOSES:  1.  L3-S1 degenerative disc disease. 2.  L3-S1 lumbar stenosis with neurogenic claudication  3. L3-S1 herniated nucleus pulposus  4. L3-4 spondylolisthesis, grade 1  5.  Obesity, BMI 31.07     POSTOPERATIVE DIAGNOSES:  1.  L3-S1 degenerative disc disease. 2.  L3-S1 lumbar stenosis with neurogenic claudication  3. L3-S1 herniated nucleus pulposus  4. L3-4 spondylolisthesis, grade 1  5.  Obesity, BMI 31.07     OPERATION PERFORMED:  1.  L3-S1 bilateral laminectomy, partial medial facetectomies and foraminotomies of L3, L4, L5, and S1 nerve roots  2.  L3-5 posterior spinal fusion. 3.  L3-5 posterior spinal instrumentation, [place], [place]  4.  Use of local autograft bone and crushed cancellous chips     SURGEON: Nick Polk MD     INDICATIONS:  This is a 61 y.o. female with refractory back and bilateral leg pain with numbness and tingling from degenerative disc disease and lumbar stenosis from L3-S1 and degenerative spondylolisthesis at L3-4.  Patient has failed full conservative treatment. Due to the persistence of symptoms and reduction in the ADLs, patient elected surgical treatment. Patient, therefore, understood indications for the surgery as well as its risks, benefits, and alternatives.  These risks include but are not limited to paralysis, infection, hematoma, dural tear, nerve root injury, nonunion, DVT/PE, stroke, MI, death etc.  All questions were answered. Informed consent was obtained. HOSPITAL COURSE:   The patient was admitted on the above date had the above procedure performed. Patient had a durotomy that was repaired during surgery. The patient was then transferred to the PACU and to a regular nursing floor for postop care.  The patient's pain was initially controlled with IV medications, but we were able to transition to oral pain medications soon after arrival to the floor. Their pain remained under good control through their hospital stay. Internal Medicine was consulted for medical management. Patient had improvement of their radicular symptoms. She was up and ambulating with PT on POD#1 without a headache. POD#2 patient continue to do well, she was ambulating in halls with PT. No leg pain. Hgb 11.1. The patient had a bowel movement before discharge. The patient was seen by Physical Therapy and was found to be an ideal candidate for discharge to home. The patient was then safely discharged to home on the above date w/ [facility] for drain management      PHYSICAL EXAMINATION ON DISCHARGE:   The patient was afebrile, stable. Dressing was clean, dry and intact. 5/5 strength in bilateral lower extremities. Neurologically intact. Hemovac drains in place and functioning on half compression      DISCHARGE INSTRUCTIONS:   Patient can resume regular diet. Resume home medications except for NSAIDs or blood thinners. Resume asa POD#2 and all other blood thinners POD#5. Okay to resume NSAIDs 6 months after surgery. Take previously prescribed narcotic pain medications as directed. Change the dressing daily until the incision is clean and dry and then leave open to air. Okay to take a shower without submerging the incision. Wear the brace at all times when up and ambulating. Limit any heavy lifting, bending or twisting until first postoperative visit. Patient to follow up with Dr. En Sierra 6 weeks post discharge as scheduled.     CONDITION AT DISCHARGE:  Stable    DISPOSITION:   Home w/  for drain management    Sury Echevarria PA-C

## 2021-03-26 ENCOUNTER — CARE COORDINATION (OUTPATIENT)
Dept: CASE MANAGEMENT | Age: 61
End: 2021-03-26

## 2021-03-26 NOTE — CARE COORDINATION
[facility]  Transitions Follow Up Call    3/26/2021    Patient: Mariaa Sender  Patient : 1960   MRN: <[id number]>  Reason for Admission: L3-5 DECOMPRESSION, L3-4 POSTERIOR FUSION  Discharge Date: 3/21/21 RARS: Readmission Risk Score: 14         Spoke with: Ming Henryelor pretty good, Eric Santana nurse took out the tubes out today. States pain is still present. She is taking percocet. States she has pain while getting up out of bed. using walker with wheels. States she was supposed to have a 2 wheeled walker and toilet riser and has neither. Eric Santana nurse will not help with this. Writer requested patient call Dr Alison Pritchard office and let them know what she needs. Patient verbalized understanding. She states she is eating and drinking well and having normal bowel movement. Denies any there concerns at this time. Needs to be reviewed by the provider   Additional needs identified to be addressed with provider Yes  DME-toilet riser           Method of communication with provider : patient will call dr Mikki Alatorre today      Care Transition Nurse (CTN) contacted the patient by telephone to follow up after admission . Verified name and  with patient as identifiers. Addressed changes since last contact: symptom management-pain , ambulation  Discharged needs reviewed: none  Follow up appointment completed? No    Advance Care Planning:   Does patient have an Advance Directive:  reviewed and current. CTN reviewed discharge instructions, medical action plan and red flags with patient and discussed any barriers to care and/or understanding of plan of care after discharge. Discussed appropriate site of care based on symptoms and resources available to patient including: Specialist and When to call 911. The patient agrees to contact the PCP office for questions related to their healthcare.      Patients top risk factors for readmission: medical condition  Interventions to address risk factors: Obtained and reviewed discharge summary and/or continuity of care documents      Plan for follow-up call in 5-7 days based on severity of symptoms and risk factors. Plan for next call: symptom management-pain  CTN provided contact information for future needs. Care Transitions Subsequent and Final Call    Subsequent and Final Calls  Care Transitions Interventions  Other Interventions:            Follow Up  Future Appointments   Date Time Provider Bertin Yasmin   6/15/2021  2:00 PM STR CT IMAGING RM1  [facility] OUT EXP STR Radiolog   6/22/2021  1:30 PM JAMEY Hassan - CNP Brittni Pires [facility] [place]   2/4/2022  1:45 PM Glynn Enriquez MD N SRPX Heart [facility] [place], [place]

## 2021-04-02 ENCOUNTER — CARE COORDINATION (OUTPATIENT)
Dept: CASE MANAGEMENT | Age: 61
End: 2021-04-02

## 2021-04-02 NOTE — CARE COORDINATION
[facility] Transitions Follow Up Call    2021    Patient: Deven Saenz  Patient : 1960   MRN: [id number]  Reason for Admission: L3-5 DECOMPRESSION, L3-4 POSTERIOR   Discharge Date: 3/21/21 RARS: Readmission Risk Score: 14         Spoke with: Patient      TRANSITION OF CARE SUBSEQUENT CALL:    CHARTING SUMMARY:  Patient states she is still having back pain - taking percocet & Flexeril with relief. Back incision clean dry and intact. She states gets [facility]. Uses O2 at night only. Denies SOB at this time. Patient has good appetite and is drinking adequate fluids. Normal elimination patterns (Urine and BM). Patient has no needs or concerns for writer at this time. Will continue to follow. Melita Hallman LPN    [phone]  Kyree Guadarrama / Beatrice Woodson 50 Transition Nurse (CTN) contacted the patient by telephone to follow up after admission on 3/22/21. Verified name and  with patient as identifiers. Addressed changes since last contact: symptom management-Back pain  Discharged needs reviewed: none  Follow up appointment completed? No    Advance Care Planning:   Does patient have an Advance Directive:  not on file. CTN reviewed discharge instructions, medical action plan and red flags with patient and discussed any barriers to care and/or understanding of plan of care after discharge. Discussed appropriate site of care based on symptoms and resources available to patient including: PCP, Specialist and When to call 911. The patient agrees to contact the PCP office for questions related to their healthcare. Patients top risk factors for readmission: lack of knowledge about disease  Interventions to address risk factors: Obtained and reviewed discharge summary and/or continuity of care documents    Discussed follow-up appointments.  If no appointment was previously scheduled, appointment scheduling offered: Yes   Is follow up appointment

## 2021-04-09 ENCOUNTER — CARE COORDINATION (OUTPATIENT)
Dept: CASE MANAGEMENT | Age: 61
End: 2021-04-09

## 2021-04-09 NOTE — CARE COORDINATION
[facility] Transitions Follow Up Call    2021    Patient: Hodan Bermeo  Patient : 1960   MRN: [id number]  Reason for Admission: Spinal Stenosis  S/p L3-L5 Decompression  L3-L4 posterior Fusion  Discharge Date: 3/21/21 RARS: Readmission Risk Score: 14         Spoke with: [address] Transitions Subsequent and Final Call    Subsequent and Final Calls  Do you have any ongoing symptoms?: Yes  Onset of Patient-reported symptoms: Other  Patient-reported symptoms: Pain  Have your medications changed?: No  Do you have any questions related to your medications?: No  Do you currently have any active services?: Yes  Are you currently active with any services?: Home Health  Do you have any needs or concerns that I can assist you with?: No  Identified Barriers: Lack of Motivation  Care Transitions Interventions  No Identified Needs  Other Interventions:         Spoke with patient today 21 for TCM/hospital discharge follow up sub call. Patient states she continues with post op back pain which she rates 8/10 in severity on pain scale. States pain is occurs mostly with movement but admits getting relief from Percocet and Flexeril. Denies wearing a back brace or support. Denies anyh loss of sensation in LE or loss of bowel/bladder function. States being incontinent of urinw which is chronic and not new. States last BM was today and is aware that pain medication can cause constipation. Denies any shortness of breath, chest pain chest discomfort, nausea, vomiting, chills or fever. States incision is dry, open to air and healing. States she is utilizing a walker when ambulating. States having a follow up scheduled with Dr. Alina Laguna (Ortho Surg) but unable to remember date/time and not noted in Epic. Denies any other complaints or concerns at this time. States she is continues with Eric  and is receptive to subsequent calls. CTN will continue to follow for Care Transition.      Follow Up  Future Appointments   Date Time Provider Bertin Hoffman   6/15/2021  2:00 PM STR CT IMAGING RM1  [facility] OUT EXP STR Radiolog   6/22/2021  1:30 PM JAMEY Jones - YAMEL Springer [facility] - [place]RISTINA [place]AN [place]   2/4/2022  1:45 PM Dwaine Medley MD N SRPX Heart [facility] - JAMEY Gudino

## 2021-04-16 ENCOUNTER — CARE COORDINATION (OUTPATIENT)
Dept: CASE MANAGEMENT | Age: 61
End: 2021-04-16

## 2021-04-16 NOTE — CARE COORDINATION
Ez[facility] Transitions Follow Up Call    2021    Patient: Loli Schaefer  Patient : 1960   MRN: [id number]  Reason for Admission: L-3 -L-5 Fusion  Discharge Date: 3/21/21 RARS: Readmission Risk Score: 14       TRANSITION OF CARE SUBSEQUENT CALL:    CHARTING SUMMARY:    Patient states she is severe back pain 10/10. She take tramadol with no relief. Patient very upset and frustrated. States she can't get up or down without major pain. Dr. Raffi Ballard office called and message left concerning patients back pain with no relief. My name and phone as well as the patient's left on voice mail with urgent request to call patient. Patient called and above info given. Asked patient to watch for Dr. Yeyo Ann office return call. Patient expresses understanding. Will continue to follow. Valerie Gomez LPN    [phone]  [facility] / [address] Transition Nurse (CTN) contacted the patient by telephone to follow up after admission on 3/22/2021. Verified name and  with patient as identifiers. Addressed changes since last contact: symptom management-Worsening back pain  Discharged needs reviewed: none  Follow up appointment completed? No    Advance Care Planning:   Does patient have an Advance Directive:  reviewed and current. CTN reviewed discharge instructions, medical action plan and red flags with patient and discussed any barriers to care and/or understanding of plan of care after discharge. Discussed appropriate site of care based on symptoms and resources available to patient including: When to call 911. The patient agrees to contact the PCP office for questions related to their healthcare.      Patients top risk factors for readmission: functional physical ability, ineffective coping, lack of knowledge about disease and medication management  Interventions to address risk factors: Obtained and reviewed discharge summary and/or continuity of care documents    Discussed follow-up appointments. If no appointment was previously scheduled, appointment scheduling offered: Yes   Is follow up appointment scheduled within 7 days of discharge? Unknown      Plan for follow-up call in 3-5 days based on severity of symptoms and risk factors. Plan for next call: symptom management-Back pain  CTN provided contact information for future needs. Harshal Swan LPN    [phone]  [address] Transitions Subsequent and Final Call    Subsequent and Final Calls  Do you have any ongoing symptoms?: Yes  Patient-reported symptoms: Pain  Have your medications changed?: No  Do you have any questions related to your medications?: No  Do you currently have any active services?: Yes  Are you currently active with any services?: Home Health  Do you have any needs or concerns that I can assist you with?: No  Identified Barriers: None  Care Transitions Interventions  Other Interventions:            Follow Up  Future Appointments   Date Time Provider Bertin Hoffman   6/15/2021  2:00 PM STR CT IMAGING RM1  [facility] EXP STR Radiolog   6/22/2021  1:30 PM JAMEY Argueta - CNP [facility] [place]   2/4/2022  1:45 PM Alma Delia Ashley MD N SRPX Heart P - Lenora Peres LPN

## 2021-04-19 ENCOUNTER — CARE COORDINATION (OUTPATIENT)
Dept: CASE MANAGEMENT | Age: 61
End: 2021-04-19

## 2021-04-19 NOTE — CARE COORDINATION
Ezra [facility] Transitions Follow Up Call    2021    Patient: Kayden Chavis  Patient : 1960   MRN: <[id number]>  Reason for Admission:  L-3 -L-5 Fusion  Discharge Date: 3/21/21 RARS: Readmission Risk Score: 14         Spoke with: Florinda who states she is still in 10 /10 pain, she has not been contacted back By Dr Nakul Barboza office. She does not have nay more percocet and she refuses to take tylenol, states \"I am not taking those things they do not work. \" patient will call  office again today if they do not return her call. Needs to be reviewed by the provider   Additional needs identified to be addressed with provider Yes  pain scale 10/10           Method of communication with provider : phone    Care Transition Nurse (CTN) contacted the patient by telephone to follow up after admission on 3/19/21. Verified name and  with patient as identifiers. Addressed changes since last contact: symptom management-pain is still 10/10 ,  has not called patient back  Discharged needs reviewed: none  Follow up appointment completed? No    Advance Care Planning:   Does patient have an Advance Directive:  reviewed and current. CTN reviewed discharge instructions, medical action plan and red flags with patient and discussed any barriers to care and/or understanding of plan of care after discharge. Discussed appropriate site of care based on symptoms and resources available to patient including: Specialist and When to call 911. The patient agrees to contact the PCP office for questions related to their healthcare. Patients top risk factors for readmission: medical condition-back pain  Interventions to address risk factors: Obtained and reviewed discharge summary and/or continuity of care documents      Plan for follow-up call in 1-2 days based on severity of symptoms and risk factors.   Plan for next call: symptom management-pain 10/10 dr return call  CTN provided contact information for future Patient last seen on 1/22/2021. Patient due back in April   needs. Care Transitions Subsequent and Final Call    Subsequent and Final Calls  Care Transitions Interventions  Other Interventions:            Follow Up  Future Appointments   Date Time Provider Bertin Yasmin   6/15/2021  2:00 PM STR CT IMAGING RM1  [facility] EXP STR Radiolog   6/22/2021  1:30 PM JAMEY Padilla - CNP [facility] [place]   2/4/2022  1:45 PM Solomon Mora MD N SRPX Heart [facility] - [address], [place]

## 2021-04-21 ENCOUNTER — CARE COORDINATION (OUTPATIENT)
Dept: CASE MANAGEMENT | Age: 61
End: 2021-04-21

## 2021-04-21 NOTE — CARE COORDINATION
[facility] Transitions Follow Up Call    2021    Patient: Warren Soto  Patient : 1960   MRN: [id number]  Reason for Admission: L3-5 Spinal Fusion  Discharge Date: 3/21/21 RARS: Readmission Risk Score: 14         TRANSITION OF CARE SUBSEQUENT CALL:    CHARTING SUMMARY:    Patient states she is better today. Got more pain medications yesterday and pain has decreased. 7/10 at this time. [facility] comes out 3 X a week. Patient uses walker for ambulation with no issues at this time. Patient confirms has all medications and is taking as directed. Back Incision clean, dry and intact. Denies warmth, pain, redness, pus drainage, or swollen to area. No fever or chills. Will see surgeon 2021. Patient is aware of when to contact MD with any new or worsening symptoms. Patient has no needs or concerns for writer at this time. Will continue to follow. Isabel Gr LPN    [phone]  Reagan Easley / [facility] Coordinator      Needs to be reviewed by the provider   Additional needs identified to be addressed with provider No               Care Transition Nurse (CTN) contacted the patient by telephone to follow up after admission on 3/22/2021. Verified name and  with patient as identifiers. Addressed changes since last contact: symptom management-Back pain  Discharged needs reviewed: none  Follow up appointment completed? No    Advance Care Planning:   Does patient have an Advance Directive:  reviewed and current. CTN reviewed discharge instructions, medical action plan and red flags with patient and discussed any barriers to care and/or understanding of plan of care after discharge. Discussed appropriate site of care based on symptoms and resources available to patient including: PCP, Specialist, Home health and When to call 911. The patient agrees to contact the PCP office for questions related to their healthcare.      Patients top risk factors for

## 2021-04-26 ENCOUNTER — CARE COORDINATION (OUTPATIENT)
Dept: CASE MANAGEMENT | Age: 61
End: 2021-04-26

## 2021-04-26 NOTE — CARE COORDINATION
Ez[facility] Transitions Follow Up Call    2021    Patient: Adenike Khan  Patient : 1960   MRN: <[id number]>  Reason for Admission:  L3-5 Spinal Fusion  Discharge Date: 3/21/21 RARS: Readmission Risk Score: 15         Spoke with:Called to speak with patient for update with transition of care. Left HIPPA compliant voice message with contact information [phone] for a call  Back with an update. Care Transitions Subsequent and Final Call    Subsequent and Final Calls  Care Transitions Interventions  Other Interventions:            Follow Up  Future Appointments   Date Time Provider Bertin Hoffman   6/15/2021  2:00 PM STR CT IMAGING RM1  [facility] EXP STR Radiolog   2021  1:30 PM JAMEY Oconnor - CNP Unknown [facility] [place]   2022  1:45 PM Roxi Butcher MD N SRPX Heart [facility] - [place] Patient, [place]

## 2021-05-29 DIAGNOSIS — J44.9 CHRONIC OBSTRUCTIVE PULMONARY DISEASE, UNSPECIFIED COPD TYPE (HCC): ICD-10-CM

## 2021-06-01 RX ORDER — ALBUTEROL SULFATE 90 UG/1
AEROSOL, METERED RESPIRATORY (INHALATION)
Qty: 8.5 G | Refills: 3 | Status: SHIPPED | OUTPATIENT
Start: 2021-06-01 | End: 2021-08-30

## 2021-06-04 ENCOUNTER — TELEPHONE (OUTPATIENT)
Dept: PULMONOLOGY | Age: 61
End: 2021-06-04

## 2021-06-04 DIAGNOSIS — J44.9 STAGE 3 SEVERE COPD BY GOLD CLASSIFICATION (HCC): ICD-10-CM

## 2021-06-04 RX ORDER — PREDNISONE 20 MG/1
20 TABLET ORAL 2 TIMES DAILY
Qty: 10 TABLET | Refills: 0 | Status: SHIPPED | OUTPATIENT
Start: 2021-06-04 | End: 2021-06-09

## 2021-06-04 RX ORDER — AMOXICILLIN AND CLAVULANATE POTASSIUM 875; 125 MG/1; MG/1
1 TABLET, FILM COATED ORAL 2 TIMES DAILY
Qty: 14 TABLET | Refills: 0 | Status: SHIPPED | OUTPATIENT
Start: 2021-06-04 | End: 2021-06-11

## 2021-06-04 NOTE — TELEPHONE ENCOUNTER
Patient called to request steroid/prednisone and an antibiotic to help her with a sinus infection.   DOLV 03/10/2021 DONV 06/22/2021  [facility] on [address]  Please advise  [phone]

## 2021-06-04 NOTE — TELEPHONE ENCOUNTER
Donell Nurse called requesting a refill on the following medications:  Requested Prescriptions     Pending Prescriptions Disp Refills    guaiFENesin (MUCINEX) 600 MG extended release tablet 60 tablet 11     Sig: take 1 tablet by mouth twice a day     Pharmacy verified:  .pv  [facility] on [address]    Date of last visit: 03/10/2021  Date of next visit (if applicable): 9/89/6714

## 2021-06-14 RX ORDER — MIRTAZAPINE 30 MG/1
TABLET, FILM COATED ORAL
Qty: 30 TABLET | Refills: 11 | Status: SHIPPED | OUTPATIENT
Start: 2021-06-14

## 2021-06-23 NOTE — TELEPHONE ENCOUNTER
Nicolas Hernandez called requesting a refill on the following medications:  Requested Prescriptions     Pending Prescriptions Disp Refills    mirabegron (MYRBETRIQ) 25 MG TB24 [Pharmacy Med Name: MYRBETRIQ ER 25 MG TABLET] 30 tablet 11     Sig: take 1 tablet by mouth once daily     Pharmacy verified:  .art      Date of last visit: 06/27/19 with Worthy Croft  Date of next visit (if applicable): Visit date not found

## 2021-07-19 ENCOUNTER — TELEPHONE (OUTPATIENT)
Dept: FAMILY MEDICINE CLINIC | Age: 61
End: 2021-07-19

## 2021-07-19 NOTE — TELEPHONE ENCOUNTER
Kamron with [facility]  wanting to know if Sonali Tolbert will follow [facility].

## 2021-08-28 DIAGNOSIS — J44.9 CHRONIC OBSTRUCTIVE PULMONARY DISEASE, UNSPECIFIED COPD TYPE (HCC): ICD-10-CM

## 2021-08-30 RX ORDER — ALBUTEROL SULFATE 90 UG/1
AEROSOL, METERED RESPIRATORY (INHALATION)
Qty: 8.5 G | Refills: 3 | Status: SHIPPED | OUTPATIENT
Start: 2021-08-30 | End: 2021-11-15

## 2021-09-16 ENCOUNTER — TELEPHONE (OUTPATIENT)
Dept: PULMONOLOGY | Age: 61
End: 2021-09-16

## 2021-09-16 ENCOUNTER — OFFICE VISIT (OUTPATIENT)
Dept: PULMONOLOGY | Age: 61
End: 2021-09-16
Payer: COMMERCIAL

## 2021-09-16 VITALS
HEIGHT: 64 IN | HEART RATE: 89 BPM | TEMPERATURE: 97.2 F | OXYGEN SATURATION: 94 % | SYSTOLIC BLOOD PRESSURE: 90 MMHG | WEIGHT: 176.4 LBS | BODY MASS INDEX: 30.11 KG/M2 | DIASTOLIC BLOOD PRESSURE: 80 MMHG

## 2021-09-16 DIAGNOSIS — J44.1 COPD EXACERBATION (HCC): Primary | ICD-10-CM

## 2021-09-16 DIAGNOSIS — J44.9 STAGE 3 SEVERE COPD BY GOLD CLASSIFICATION (HCC): ICD-10-CM

## 2021-09-16 DIAGNOSIS — R93.89 NODULAR RADIOLOGIC DENSITY: ICD-10-CM

## 2021-09-16 DIAGNOSIS — Z21 HIV POSITIVE (HCC): ICD-10-CM

## 2021-09-16 DIAGNOSIS — F17.209 TOBACCO USE DISORDER, CONTINUOUS: ICD-10-CM

## 2021-09-16 PROCEDURE — 4004F PT TOBACCO SCREEN RCVD TLK: CPT | Performed by: NURSE PRACTITIONER

## 2021-09-16 PROCEDURE — G8417 CALC BMI ABV UP PARAM F/U: HCPCS | Performed by: NURSE PRACTITIONER

## 2021-09-16 PROCEDURE — 3017F COLORECTAL CA SCREEN DOC REV: CPT | Performed by: NURSE PRACTITIONER

## 2021-09-16 PROCEDURE — 3023F SPIROM DOC REV: CPT | Performed by: NURSE PRACTITIONER

## 2021-09-16 PROCEDURE — G8427 DOCREV CUR MEDS BY ELIG CLIN: HCPCS | Performed by: NURSE PRACTITIONER

## 2021-09-16 PROCEDURE — 99214 OFFICE O/P EST MOD 30 MIN: CPT | Performed by: NURSE PRACTITIONER

## 2021-09-16 PROCEDURE — G8926 SPIRO NO PERF OR DOC: HCPCS | Performed by: NURSE PRACTITIONER

## 2021-09-16 RX ORDER — DOXYCYCLINE HYCLATE 100 MG/1
100 CAPSULE ORAL 2 TIMES DAILY
Qty: 14 CAPSULE | Refills: 0 | Status: SHIPPED | OUTPATIENT
Start: 2021-09-16 | End: 2021-09-23

## 2021-09-16 RX ORDER — PREDNISONE 50 MG/1
50 TABLET ORAL DAILY
Qty: 5 TABLET | Refills: 0 | Status: SHIPPED | OUTPATIENT
Start: 2021-09-16 | End: 2021-09-21

## 2021-09-16 ASSESSMENT — ENCOUNTER SYMPTOMS
VOMITING: 0
BACK PAIN: 1
EYE DISCHARGE: 1
ALLERGIC/IMMUNOLOGIC NEGATIVE: 1
WHEEZING: 1
DIARRHEA: 0
COUGH: 1
NAUSEA: 0
SHORTNESS OF BREATH: 1
ABDOMINAL PAIN: 0
CHEST TIGHTNESS: 1

## 2021-09-16 NOTE — PROGRESS NOTES
[facility]     Patient: Inocente Franklin, 64 y.o.   : 1960    Pt of Dr. Fabian Angulo   Patient presents with    Follow-up     Right lower lobe pulmonary nodule 6 month follow up. Patient did not have CT done    Other     Requested to keep appointment         HPI  Andrew Fontanez is here for follow up for Pulmonary Nodule /COPD  Since last visit- had stroke \" on my 64 st birthday\"  Still numb in left lower leg, has residual left sided upper extremity weakness   Had back surgery 6 months ago - still having bilateral leg pains and lower back pain   Ambulation limited due to pain/ weakness      CT lung screen completed 6/15/20: slight increase in nodular portion of right basilar parenchymal scarring now measuring 9 mm - this was LUNG RADS 4A -  This area has been present since  but now with slight increase in size, She was having infectious symptoms at the time and was treated w/ Doxy x 7 days ,  f/u'd with 3 month CT 2020- RLL nodule increased in size to 1.5 x 10 mm . Completed PET/CT   Interval reduction in size of a small right lower lobe nodular density, likely benign. Follow-up CT of the chest in 3 months is recommended. Scheduled for CT chest in 2021-she failed to get this done due to her stroke      C/o coughing more than normal with yellow mucous, trouble getting mucous cleared. associated with yellow crusting in eyes, sinus pressure , more SOB, and increased wheezing        Current Inhalers:  Bevespi- not using, could not remember what to be using , has had trouble with insurance  albuterol sulfate HFA- using several times per day   Previous Inhalers: Michael Hernandez- needed step up therapy, Anoro- insurance would not cover, Duoneb- stopped due to changing to LAMA/LABA    Last Spirometry :  Severe COPD, FEV 1 34% pre BD, 45% post BD.          SOCIAL HISTORY:  Social History     Tobacco Use    Smoking status: Current  BIKTARVY -25 MG TABS per tablet take 1 tablet by mouth once daily      benzonatate (TESSALON PERLES) 100 MG capsule Take 1 capsule by mouth 3 times daily as needed for Cough 45 capsule 1    oxybutynin (DITROPAN XL) 5 MG extended release tablet Take 1 tablet by mouth daily 30 tablet 11    buPROPion (WELLBUTRIN XL) 150 MG extended release tablet Take 150 mg by mouth every morning      cetirizine-psuedoephedrine (ZYRTEC-D) 5-120 MG per extended release tablet take 1 tablet by mouth twice a day 60 tablet 11    OXYGEN Inhale 2 L into the lungs nightly      glycopyrrolate-formoterol (BEVESPI AEROSPHERE) 9-4.8 MCG/ACT AERO Inhale 2 puffs into the lungs 2 times daily (Patient not taking: Reported on 9/16/2021) 1 Inhaler 11     No current facility-administered medications for this visit. Bowen MEDINA   Review of Systems   Constitutional: Positive for fatigue. Negative for chills and fever. HENT: Negative. Eyes: Positive for discharge. Respiratory: Positive for cough, chest tightness, shortness of breath and wheezing. Cardiovascular: Negative for chest pain, palpitations and leg swelling. Gastrointestinal: Negative for abdominal pain, diarrhea, nausea and vomiting. Endocrine: Negative. Genitourinary: Negative. Musculoskeletal: Positive for arthralgias and back pain. Skin: Negative. Allergic/Immunologic: Negative. Neurological: Positive for weakness. Memory problems    Hematological: Negative. Psychiatric/Behavioral: Positive for sleep disturbance. The patient is nervous/anxious. Physical exam   BP 90/80 (Site: Right Upper Arm, Position: Sitting)   Pulse 89   Temp 97.2 °F (36.2 °C)   Ht 5' 4\" (1.626 m)   Wt 176 lb 6.4 oz (80 kg)   SpO2 94% Comment: on RA  BMI 30.28 kg/m²      Wt Readings from Last 3 Encounters:   09/16/21 176 lb 6.4 oz (80 kg)   03/19/21 181 lb (82.1 kg)   03/09/21 178 lb 9.2 oz (81 kg)     Physical Exam  Vitals and nursing note reviewed. Constitutional:       General: She is not in acute distress. Appearance: She is overweight. HENT:      Mouth/Throat:      Lips: Pink. Mouth: Mucous membranes are moist.      Pharynx: Oropharynx is clear. No oropharyngeal exudate or posterior oropharyngeal erythema. Eyes:      Conjunctiva/sclera: Conjunctivae normal.   Neck:      Vascular: No JVD. Cardiovascular:      Rate and Rhythm: Normal rate and regular rhythm. Heart sounds: No murmur heard. No friction rub. Pulmonary:      Effort: Pulmonary effort is normal. No accessory muscle usage or respiratory distress. Breath sounds: Decreased air movement present. Examination of the right-upper field reveals wheezing. Examination of the left-upper field reveals wheezing. Examination of the right-middle field reveals wheezing. Examination of the left-middle field reveals wheezing. Examination of the right-lower field reveals wheezing. Examination of the left-lower field reveals wheezing and rales. Wheezing and rales present. No rhonchi. Chest:      Chest wall: No tenderness. Musculoskeletal:      Right lower leg: No edema. Left lower leg: No edema. Skin:     General: Skin is warm and dry. Capillary Refill: Capillary refill takes less than 2 seconds. Nails: There is no clubbing. Neurological:      Mental Status: She is alert. Psychiatric:         Mood and Affect: Mood normal.         Behavior: Behavior normal.         Thought Content: Thought content normal.         Cognition and Memory: She exhibits impaired recent memory. Judgment: Judgment normal.         Test results   Lung Nodule Screening     [x] Qualifies    []Does not qualify   [] Declined    [x] Completed     Assessment      Diagnosis Orders   1. COPD exacerbation (HCC)     2. Stage 3 severe COPD by GOLD classification ([address]  CT CHEST WO CONTRAST   3. Nodular radiologic density  CT CHEST WO CONTRAST   4. HIV positive ([address]  CT CHEST WO CONTRAST   5.

## 2021-09-16 NOTE — PATIENT INSTRUCTIONS
I am sending you with 2 new samples :  Called Uvaldo - use this 2 puffs in the morning, 2 puffs in the evenings - use with spacer, rinse after use   While using this sample do not use your nebulizer,     I sent a different medicine to your pharmacy ( [facility]) called Liz Gaspar - use this 2 puffs in morning and 2 puffs in evening with spacer, rinse after use   You can go back to using your nebulizer 4x daily  once starting back on Liz Gaspar        Patient Education        Stopping Smoking: Care Instructions  Your Care Instructions     Cigarette smokers crave the nicotine in cigarettes. Giving it up is much harder than simply changing a habit. Your body has to stop craving the nicotine. It is hard to quit, but you can do it. There are many tools that people use to quit smoking. You may find that combining tools works best for you. There are several steps to quitting. First you get ready to quit. Then you get support to help you. After that, you learn new skills and behaviors to become a nonsmoker. For many people, a necessary step is getting and using medicine. Your doctor will help you set up the plan that best meets your needs. You may want to attend a smoking cessation program to help you quit smoking. When you choose a program, look for one that has proven success. Ask your doctor for ideas. You will greatly increase your chances of success if you take medicine as well as get counseling or join a cessation program.  Some of the changes you feel when you first quit tobacco are uncomfortable. Your body will miss the nicotine at first, and you may feel short-tempered and grumpy. You may have trouble sleeping or concentrating. Medicine can help you deal with these symptoms. You may struggle with changing your smoking habits and rituals. The last step is the tricky one: Be prepared for the smoking urge to continue for a time. This is a lot to deal with, but keep at it. You will feel better.   Follow-up care is a key part of your treatment and safety. Be sure to make and go to all appointments, and call your doctor if you are having problems. It's also a good idea to know your test results and keep a list of the medicines you take. How can you care for yourself at home? · Ask your family, friends, and coworkers for support. You have a better chance of quitting if you have help and support. · Join a support group, such as Nicotine Anonymous, for people who are trying to quit smoking. · Consider signing up for a smoking cessation program, such as the American Lung Association's Freedom from Smoking program.  · Get text messaging support. Go to the website at www.smokefree. gov to sign up for the [facility] program.  · Set a quit date. Pick your date carefully so that it is not right in the middle of a big deadline or stressful time. Once you quit, do not even take a puff. Get rid of all ashtrays and lighters after your last cigarette. Clean your house and your clothes so that they do not smell of smoke. · Learn how to be a nonsmoker. Think about ways you can avoid those things that make you reach for a cigarette. ? Avoid situations that put you at greatest risk for smoking. For some people, it is hard to have a drink with friends without smoking. For others, they might skip a coffee break with coworkers who smoke. ? Change your daily routine. Take a different route to work or eat a meal in a different place. · Cut down on stress. Calm yourself or release tension by doing an activity you enjoy, such as reading a book, taking a hot bath, or gardening. · Talk to your doctor or pharmacist about nicotine replacement therapy, which replaces the nicotine in your body. You still get nicotine but you do not use tobacco. Nicotine replacement products help you slowly reduce the amount of nicotine you need. These products come in several forms, many of them available over-the-counter:  ? Nicotine patches  ?  Nicotine gum and lozenges  ? Nicotine inhaler  · Ask your doctor about bupropion (Wellbutrin) or varenicline (Chantix), which are prescription medicines. They do not contain nicotine. They help you by reducing withdrawal symptoms, such as stress and anxiety. · Some people find hypnosis, acupuncture, and massage helpful for ending the smoking habit. · Eat a healthy diet and get regular exercise. Having healthy habits will help your body move past its craving for nicotine. · Be prepared to keep trying. Most people are not successful the first few times they try to quit. Do not get mad at yourself if you smoke again. Make a list of things you learned and think about when you want to try again, such as next week, next month, or next year. Where can you learn more? Go to https://woodpellets.com.BYOM!. org and sign in to your Net[employer] account. Enter W356 in the salgomed box to learn more about \"Stopping Smoking: Care Instructions. \"     If you do not have an account, please click on the \"Sign Up Now\" link. Current as of: February 11, 2021               Content Version: 12.9  © 8071-0493 Healthwise, Incorporated. Care instructions adapted under license by [facility] ([facility]). If you have questions about a medical condition or this instruction, always ask your healthcare professional. [address] any warranty or liability for your use of this information.

## 2021-09-22 ENCOUNTER — TELEPHONE (OUTPATIENT)
Dept: FAMILY MEDICINE CLINIC | Age: 61
End: 2021-09-22

## 2021-09-22 NOTE — TELEPHONE ENCOUNTER
She would need to contact the company and see what her options are and then they would need to let us know what they need from us.

## 2021-10-06 ENCOUNTER — HOSPITAL ENCOUNTER (OUTPATIENT)
Dept: CT IMAGING | Age: 61
Discharge: HOME | End: 2021-10-06
Payer: COMMERCIAL

## 2021-10-06 DIAGNOSIS — R93.89 NODULAR RADIOLOGIC DENSITY: ICD-10-CM

## 2021-10-06 DIAGNOSIS — J44.9 STAGE 3 SEVERE COPD BY GOLD CLASSIFICATION (HCC): ICD-10-CM

## 2021-10-06 DIAGNOSIS — F17.209 TOBACCO USE DISORDER, CONTINUOUS: ICD-10-CM

## 2021-10-06 DIAGNOSIS — Z21 HIV POSITIVE (HCC): ICD-10-CM

## 2021-10-06 PROCEDURE — 71250 CT THORAX DX C-: CPT

## 2021-10-11 ENCOUNTER — OFFICE VISIT (OUTPATIENT)
Dept: PULMONOLOGY | Age: 61
End: 2021-10-11
Payer: COMMERCIAL

## 2021-10-11 VITALS
TEMPERATURE: 96.7 F | DIASTOLIC BLOOD PRESSURE: 68 MMHG | WEIGHT: 178.2 LBS | HEART RATE: 76 BPM | OXYGEN SATURATION: 96 % | SYSTOLIC BLOOD PRESSURE: 110 MMHG | HEIGHT: 64 IN | BODY MASS INDEX: 30.42 KG/M2

## 2021-10-11 DIAGNOSIS — R09.81 SINUS CONGESTION: ICD-10-CM

## 2021-10-11 DIAGNOSIS — Z21 HIV POSITIVE (HCC): ICD-10-CM

## 2021-10-11 DIAGNOSIS — R93.89 NODULAR RADIOLOGIC DENSITY: ICD-10-CM

## 2021-10-11 DIAGNOSIS — J44.9 STAGE 3 SEVERE COPD BY GOLD CLASSIFICATION (HCC): Primary | ICD-10-CM

## 2021-10-11 PROCEDURE — G8417 CALC BMI ABV UP PARAM F/U: HCPCS | Performed by: NURSE PRACTITIONER

## 2021-10-11 PROCEDURE — 3017F COLORECTAL CA SCREEN DOC REV: CPT | Performed by: NURSE PRACTITIONER

## 2021-10-11 PROCEDURE — 3023F SPIROM DOC REV: CPT | Performed by: NURSE PRACTITIONER

## 2021-10-11 PROCEDURE — G8427 DOCREV CUR MEDS BY ELIG CLIN: HCPCS | Performed by: NURSE PRACTITIONER

## 2021-10-11 PROCEDURE — 4004F PT TOBACCO SCREEN RCVD TLK: CPT | Performed by: NURSE PRACTITIONER

## 2021-10-11 PROCEDURE — G8926 SPIRO NO PERF OR DOC: HCPCS | Performed by: NURSE PRACTITIONER

## 2021-10-11 PROCEDURE — G8484 FLU IMMUNIZE NO ADMIN: HCPCS | Performed by: NURSE PRACTITIONER

## 2021-10-11 PROCEDURE — 99214 OFFICE O/P EST MOD 30 MIN: CPT | Performed by: NURSE PRACTITIONER

## 2021-10-11 RX ORDER — CETIRIZINE HYDROCHLORIDE, PSEUDOEPHEDRINE HYDROCHLORIDE 5; 120 MG/1; MG/1
TABLET, FILM COATED, EXTENDED RELEASE ORAL
Qty: 60 TABLET | Refills: 0 | Status: SHIPPED | OUTPATIENT
Start: 2021-10-11

## 2021-10-11 RX ORDER — BUDESONIDE, GLYCOPYRROLATE, AND FORMOTEROL FUMARATE 160; 9; 4.8 UG/1; UG/1; UG/1
2 AEROSOL, METERED RESPIRATORY (INHALATION) 2 TIMES DAILY
Qty: 10.7 G | Refills: 11 | Status: SHIPPED | OUTPATIENT
Start: 2021-10-11 | End: 2022-10-20

## 2021-10-11 RX ORDER — ALBUTEROL SULFATE 2.5 MG/3ML
2.5 SOLUTION RESPIRATORY (INHALATION) EVERY 6 HOURS PRN
Qty: 120 EACH | Refills: 11 | Status: SHIPPED | OUTPATIENT
Start: 2021-10-11 | End: 2022-10-11

## 2021-10-11 ASSESSMENT — ENCOUNTER SYMPTOMS
VOMITING: 0
NAUSEA: 0
SHORTNESS OF BREATH: 0
DIARRHEA: 0
COUGH: 1
WHEEZING: 0
ABDOMINAL PAIN: 0
EYES NEGATIVE: 1
CHEST TIGHTNESS: 0

## 2021-10-11 NOTE — PATIENT INSTRUCTIONS
Patient Education     TRY DELSYM COUGH SYRUP FROM THE PHARMACY ALONG WITH COUGH DROPS AND THE TESSALON FOR YOUR COUGH  WANT TO AVOID THINGS THAT CAUSE COUGH SUCH AS SMOKING, BEING AROUND SMOKE FROM CAMP FIRES, FUMES. AVOID BEING AROUND SICK PEOPLE TO AVOID GETTING A COLD       Chronic Cough: Care Instructions  Your Care Instructions     A cough is your body's response to something that bothers your throat or airways. Many things can cause a cough. You might cough because of a cold or the flu, bronchitis, or asthma. Smoking, postnasal drip, allergies, and stomach acid that backs up into your throat also can cause a cough. A cough can be short-term (acute) or long-term (chronic). A chronic cough lasts more than 3 weeks. A chronic cough is often caused by a long-term problem, such as asthma. Another cause might be a medicine, such as an ACE inhibitor. A cough is a symptom, not a disease. To treat a chronic cough, you may need to treat the problem that causes it. You can take a few steps at home to cough less and feel better. Some people cough or clear their throat out of habit for no clear reason. Follow-up care is a key part of your treatment and safety. Be sure to make and go to all appointments, and call your doctor if you are having problems. It's also a good idea to know your test results and keep a list of the medicines you take. How can you care for yourself at home? · Drink plenty of water and other fluids. This may help soothe a dry or sore throat. Honey or lemon juice in hot water or tea may ease a dry cough. · Prop up your head on pillows to help you breathe and ease a cough. · Do not smoke or allow others to smoke around you. Smoke can make a cough worse. If you need help quitting, talk to your doctor about stop-smoking programs and medicines. These can increase your chances of quitting for good. · Avoid exposure to smoke, dust, or other pollutants, or wear a face mask.  Check with your doctor or pharmacist to find out which type of face mask will give you the most benefit. · Take cough medicine as directed by your doctor. · Try cough drops to soothe a dry or sore throat. Cough drops don't stop a cough. Medicine-flavored cough drops are no better than candy-flavored drops or hard candy. Throat clearing  When you have a chronic cough or a disease that may cause this type of cough, you may often feel like you want to clear your throat. This helps bring up mucus. But throat clearing does not always have a cause. Throat clearing can become a habit. The more you do it, the more you feel like you need to do it. But frequent throat clearing can be hard on your vocal cords. It's like slamming them together. To help lessen throat clearing, you can try:  · Taking small sips of water. · Not clearing your throat when you feel you need to. · Swallowing hard when you want to clear your throat. You may want to ask your doctor if a medicine that thins mucus would help. When should you call for help? Call 911 anytime you think you may need emergency care. For example, call if:    · You have severe trouble breathing. Call your doctor now or seek immediate medical care if:    · You cough up blood.     · You have new or worse trouble breathing.     · You have a new or higher fever. Watch closely for changes in your health, and be sure to contact your doctor if:    · You cough more deeply or more often, especially if you notice more mucus or a change in the color of your mucus.     · You do not get better as expected. Where can you learn more? Go to https://Teevoxrebekah.FluGen. org and sign in to your [employer] account. Enter W107 in the [facility] box to learn more about \"Chronic Cough: Care Instructions. \"     If you do not have an account, please click on the \"Sign Up Now\" link. Current as of: July 6, 2021               Content Version: 13.0  © 4096-6710 Healthwise, [facility].    Care instructions adapted under license by [facility] ([facility]). If you have questions about a medical condition or this instruction, always ask your healthcare professional. [address] any warranty or liability for your use of this information. Patient Education        Stopping Smoking: Care Instructions  Your Care Instructions     Cigarette smokers crave the nicotine in cigarettes. Giving it up is much harder than simply changing a habit. Your body has to stop craving the nicotine. It is hard to quit, but you can do it. There are many tools that people use to quit smoking. You may find that combining tools works best for you. There are several steps to quitting. First you get ready to quit. Then you get support to help you. After that, you learn new skills and behaviors to become a nonsmoker. For many people, a necessary step is getting and using medicine. Your doctor will help you set up the plan that best meets your needs. You may want to attend a smoking cessation program to help you quit smoking. When you choose a program, look for one that has proven success. Ask your doctor for ideas. You will greatly increase your chances of success if you take medicine as well as get counseling or join a cessation program.  Some of the changes you feel when you first quit tobacco are uncomfortable. Your body will miss the nicotine at first, and you may feel short-tempered and grumpy. You may have trouble sleeping or concentrating. Medicine can help you deal with these symptoms. You may struggle with changing your smoking habits and rituals. The last step is the tricky one: Be prepared for the smoking urge to continue for a time. This is a lot to deal with, but keep at it. You will feel better. Follow-up care is a key part of your treatment and safety. Be sure to make and go to all appointments, and call your doctor if you are having problems.  It's also a good idea to know your test results and keep a list of the medicines you take. How can you care for yourself at home? · Ask your family, friends, and coworkers for support. You have a better chance of quitting if you have help and support. · Join a support group, such as Nicotine Anonymous, for people who are trying to quit smoking. · Consider signing up for a smoking cessation program, such as the American Lung Association's Freedom from Smoking program.  · Get text messaging support. Go to the website at www.smokefree. gov to sign up for the [facility] program.  · Set a quit date. Pick your date carefully so that it is not right in the middle of a big deadline or stressful time. Once you quit, do not even take a puff. Get rid of all ashtrays and lighters after your last cigarette. Clean your house and your clothes so that they do not smell of smoke. · Learn how to be a nonsmoker. Think about ways you can avoid those things that make you reach for a cigarette. ? Avoid situations that put you at greatest risk for smoking. For some people, it is hard to have a drink with friends without smoking. For others, they might skip a coffee break with coworkers who smoke. ? Change your daily routine. Take a different route to work or eat a meal in a different place. · Cut down on stress. Calm yourself or release tension by doing an activity you enjoy, such as reading a book, taking a hot bath, or gardening. · Talk to your doctor or pharmacist about nicotine replacement therapy, which replaces the nicotine in your body. You still get nicotine but you do not use tobacco. Nicotine replacement products help you slowly reduce the amount of nicotine you need. These products come in several forms, many of them available over-the-counter:  ? Nicotine patches  ? Nicotine gum and lozenges  ? Nicotine inhaler  · Ask your doctor about bupropion (Wellbutrin) or varenicline (Chantix), which are prescription medicines. They do not contain nicotine.  They help you by reducing withdrawal symptoms, such as stress and anxiety. · Some people find hypnosis, acupuncture, and massage helpful for ending the smoking habit. · Eat a healthy diet and get regular exercise. Having healthy habits will help your body move past its craving for nicotine. · Be prepared to keep trying. Most people are not successful the first few times they try to quit. Do not get mad at yourself if you smoke again. Make a list of things you learned and think about when you want to try again, such as next week, next month, or next year. Where can you learn more? Go to https://[employer].LinkedIn. org and sign in to your gDecide account. Enter M782 in the [facility] box to learn more about \"Stopping Smoking: Care Instructions. \"     If you do not have an account, please click on the \"Sign Up Now\" link. Current as of: February 11, 2021               Content Version: 13.0  © 2006-2021 Healthwise, Incorporated. Care instructions adapted under license by [facility] ([facility]). If you have questions about a medical condition or this instruction, always ask your healthcare professional. [place] [address] any warranty or liability for your use of this information.

## 2021-10-11 NOTE — PROGRESS NOTES
Spoke to Ary Ramires at [employer] she states she will clean up patient's med list to Breztri/albuterol sulfate.

## 2021-10-11 NOTE — Clinical Note
Please call [facility] to clean up med list.  Make sure all inhalers/nebs are cancelled except Breztri and Albuterol sulfate

## 2021-10-11 NOTE — PROGRESS NOTES
[facility]     Patient: Amie Araujo, 64 y.o.   : 1960  10/11/2021    Pt of mine      Subjective     Chief Complaint   Patient presents with    Follow-up     COPD 1 month pulmonary follow up with CT 10/6/2021        [facility]  Kemar Scott is here for 1 month  follow up for COPD with CT  Very nervous for results   Treated for acute exacerbation last visit with prednisone burst and Doxy x 7 days 21  Was not using inhalers compliantly. , educated on difference of long acting and short acting INH  Given samples of Breztri to get her by until able to get [facility] from [address] [place] [address] and is now currently using it  On 2LPM O2 at night. No O2 during the day     History of RLL nodule. CT chest 2020 showed increase in size from 9 x 6 mm to 15 x 10 mm . Followed up with PET/CT, RLL decreased to 7 mm, activity in area could not be reliably  from that of the adjacent liver. No other concerning findings on CT  Here with 3 month f/u CT     Active smoker   C/o chronic cough wanted medication to help. Tessalon not helping.    Takes Muccinex daily   No ER visits     SOCIAL HISTORY:  Social History     Tobacco Use    Smoking status: Current Every Day Smoker     Packs/day: 0.50     Years: 39.00     Pack years: 19.50     Types: Cigarettes     Start date: 1974    Smokeless tobacco: Never Used    Tobacco comment: currently smoking abouyt 5 cigarettes per day 2020   Vaping Use    Vaping Use: Never used   Substance Use Topics    Alcohol use: No     Alcohol/week: 0.0 standard drinks    Drug use: No         CURRENT MEDICATIONS:  Current Outpatient Medications   Medication Sig Dispense Refill    Budeson-Glycopyrrol-Formoterol (BREZTRI AEROSPHERE) 160-9-4.8 MCG/ACT AERO Inhale 2 puffs into the lungs 2 times daily 10.7 g 11    cetirizine-psuedoephedrine (ZYRTEC-D) 5-120 MG per extended release tablet Take 1 tablet daily 60 tablet 0    albuterol (PROVENTIL) (2.5 MG/3ML) 0.083% nebulizer solution Take 3 mLs by nebulization every 6 hours as needed for Wheezing or Shortness of Breath 120 each 11    albuterol sulfate  (90 Base) MCG/ACT inhaler inhale 2 puffs by mouth every 6 hours if needed for wheezing or shortness of breath 8.5 g 3    mirabegron (MYRBETRIQ) 25 MG TB24 take 1 tablet by mouth once daily 30 tablet 11    mirtazapine (REMERON) 30 MG tablet take 1/2 TO 1 tablet by mouth NIGHTLY if needed 30 tablet 11    omeprazole (PRILOSEC) 20 MG delayed release capsule Take 1 capsule by mouth every morning (before breakfast) 30 capsule 5    BANOPHEN 25 MG capsule take 2 capsules by mouth nightly if needed for allergies or sleep 60 capsule 3    raNITIdine (ZANTAC 75) 75 MG tablet Take 1 tablet by mouth 2 times daily 180 tablet 3    LATUDA 60 MG TABS tablet take 1 tablet by mouth every evening WITH DINNER - AT LEAST 350 CALORIES      doxepin (SINEQUAN) 100 MG capsule take 1 capsule by mouth at bedtime if needed for sleep      dicyclomine (BENTYL) 10 MG capsule take 1 capsule by mouth three times a day if needed for ABDOMINAL PAIN AND CRAMPING      BIKTARVY -25 MG TABS per tablet take 1 tablet by mouth once daily      benzonatate (TESSALON PERLES) 100 MG capsule Take 1 capsule by mouth 3 times daily as needed for Cough 45 capsule 1    buPROPion (WELLBUTRIN XL) 150 MG extended release tablet Take 150 mg by mouth every morning      OXYGEN Inhale 2 L into the lungs nightly      oxybutynin (DITROPAN XL) 5 MG extended release tablet Take 1 tablet by mouth daily 30 tablet 11     No current facility-administered medications for this visit. Too MEDINA   Review of Systems   Constitutional: Negative for activity change, chills and fever. HENT: Negative. Eyes: Negative. Respiratory: Positive for cough. Negative for chest tightness, shortness of breath and wheezing. Cardiovascular: Negative for chest pain, palpitations and leg swelling. Gastrointestinal: Negative for abdominal pain, diarrhea, nausea and vomiting. Genitourinary: Negative. Musculoskeletal: Negative. Skin: Negative. Allergic/Immunologic: Positive for immunocompromised state. Neurological: Negative. Hematological: Does not bruise/bleed easily. Psychiatric/Behavioral: Negative for suicidal ideas. The patient is nervous/anxious. Physical exam   /68 (Site: Left Upper Arm, Position: Sitting)   Pulse 76   Temp 96.7 °F (35.9 °C)   Ht 5' 4\" (1.626 m)   Wt 178 lb 3.2 oz (80.8 kg)   SpO2 96% Comment: on RA  BMI 30.59 kg/m²      Wt Readings from Last 3 Encounters:   10/11/21 178 lb 3.2 oz (80.8 kg)   09/16/21 176 lb 6.4 oz (80 kg)   03/19/21 181 lb (82.1 kg)     Physical Exam  Vitals and nursing note reviewed. Constitutional:       General: She is not in acute distress. Appearance: She is well-developed. HENT:      Mouth/Throat:      Lips: Pink. Mouth: Mucous membranes are moist.      Pharynx: Oropharynx is clear. No oropharyngeal exudate or posterior oropharyngeal erythema. Eyes:      Conjunctiva/sclera: Conjunctivae normal.   Neck:      Vascular: No JVD. Cardiovascular:      Rate and Rhythm: Normal rate and regular rhythm. Heart sounds: No murmur heard. No friction rub. Pulmonary:      Effort: Pulmonary effort is normal. No accessory muscle usage or respiratory distress. Breath sounds: Normal breath sounds. No wheezing, rhonchi or rales. Chest:      Chest wall: No tenderness. Musculoskeletal:      Right lower leg: No edema. Left lower leg: No edema. Skin:     General: Skin is warm and dry. Capillary Refill: Capillary refill takes less than 2 seconds. Nails: There is no clubbing. Neurological:      Mental Status: She is alert and oriented to person, place, and time.    Psychiatric:         Attention and Perception: Attention normal.         Mood and Affect: Mood normal.         Behavior: Behavior is hyperactive. Thought Content: Thought content normal.         Judgment: Judgment normal.      Comments: Anxious           Test results   Lung Nodule Screening     [x] Qualifies    []Does not qualify   [] Declined    [x] Completed     Ct chest wo contrast 10/6/2021         Impression       1. Stable linear scarlike opacity in the right lower lobe with nodular component inferiorly, with the small nodular component slightly decreased in size compared to 6/15/2020 as evidence for benignity. 2. Moderate emphysematous changes.                   **This report has been created using voice recognition software. It may contain minor errors which are inherent in voice recognition technology. **       Final report electronically signed by Dr. Tamera Mcnamara MD on 10/6/2021 5:26 PM       PET/CT 12/14/2020  COMPARISON: CT chest 11/24/2020       FINDINGS:        Neck: No FDG avid cervical lymphadenopathy.       Chest: Cardiac size is normal. Atherosclerotic calcifications are present in the thoracic aorta without evidence of aneurysm. There is no pleural or pericardial effusion. Emphysematous changes are again noted in the lungs. There is scarring at the    bilateral lung apices and right lung base. A small nodular density at the right lung base now measures approximately 0.7 cm (prior measurement 1.5 cm). Activity in this density cannot be reliably  from that of the adjacent liver. There is no    hypermetabolic mediastinal, hilar or axillary lymphadenopathy. Degenerative changes are present in the thoracic spine without evidence of hypermetabolic osseous metastatic disease.       Abdomen/pelvis: Physiologic activity is present in the liver, spleen, urinary collecting system and gastrointestinal tract. The gallbladder is surgically absent. There are atherosclerotic calcifications in the abdominal aorta without evidence of    aneurysm. The uterus is surgically absent.  There is no FDG avid mesenteric, retroperitoneal, pelvic or inguinal lymphadenopathy. Degenerative changes are present in the lumbar spine and pelvis without evidence of hypermetabolic osseous metastatic    disease.           Impression       Interval reduction in size of a small right lower lobe nodular density, likely benign. Follow-up CT of the chest in 3 months is recommended.       Final report electronically signed by Dr. Yovana Small on 12/14/2020 1:30 PM       Assessment      Diagnosis Orders   1. Stage 3 severe COPD by GOLD classification ([facility] [address]  Budeson-Glycopyrrol-Formoterol (BREZTRI AEROSPHERE) 160-9-4.8 MCG/ACT AERO   2. Nodular radiologic density     3. HIV positive ([address]     4. Sinus congestion  cetirizine-psuedoephedrine (ZYRTEC-D) 5-120 MG per extended release tablet       Plan    Smokers cough vs. Cough due to allergies combined with COPD- recommend trying OTC Delsym cough syrup.   The best thing she can do to help improve cough is to quit smoking, unfortunately has no desire to try to quit  -will add Zyrtec -D take daily PRN for cough/ allergies  -lungs clear- no indication for repeat prednisone  -continue Breztri- provided with 2 more samples , and new script to pharmacy   -CT stable, go back to annual LDCT - will order at next visit     Will see Mala Rodriguez in: 6 months    Electronically signed by JAMEY Wasserman CNP on 10/11/2021 at 2:02 PM

## 2021-10-25 ENCOUNTER — TELEPHONE (OUTPATIENT)
Dept: UROLOGY | Age: 61
End: 2021-10-25

## 2021-10-25 NOTE — TELEPHONE ENCOUNTER
Prior Auth initiated for [employer] . PA sent to [facility]. Should receive response in 3-5 days.

## 2021-11-14 DIAGNOSIS — J44.9 CHRONIC OBSTRUCTIVE PULMONARY DISEASE, UNSPECIFIED COPD TYPE (HCC): ICD-10-CM

## 2021-11-15 RX ORDER — ALBUTEROL SULFATE 90 UG/1
AEROSOL, METERED RESPIRATORY (INHALATION)
Qty: 8.5 G | Refills: 3 | Status: SHIPPED | OUTPATIENT
Start: 2021-11-15

## 2021-12-01 NOTE — TELEPHONE ENCOUNTER
[facility],  I dont know if Elliot Larkin informed you but the prior Raciel Cohn was denied for myrbetriq. Please advise.

## 2022-02-09 ENCOUNTER — HOSPITAL ENCOUNTER (OUTPATIENT)
Dept: MRI IMAGING | Age: 62
Discharge: HOME | End: 2022-02-09
Payer: COMMERCIAL

## 2022-02-09 DIAGNOSIS — R20.2 PARESTHESIA: ICD-10-CM

## 2022-02-09 PROCEDURE — 70551 MRI BRAIN STEM W/O DYE: CPT

## 2022-03-14 ENCOUNTER — OFFICE VISIT (OUTPATIENT)
Dept: CARDIOLOGY CLINIC | Age: 62
End: 2022-03-14
Payer: COMMERCIAL

## 2022-03-14 VITALS
HEIGHT: 64 IN | HEART RATE: 90 BPM | BODY MASS INDEX: 31.24 KG/M2 | WEIGHT: 183 LBS | DIASTOLIC BLOOD PRESSURE: 66 MMHG | SYSTOLIC BLOOD PRESSURE: 121 MMHG

## 2022-03-14 DIAGNOSIS — Z01.818 PREOPERATIVE CLEARANCE: ICD-10-CM

## 2022-03-14 DIAGNOSIS — R06.02 SOB (SHORTNESS OF BREATH) ON EXERTION: Primary | ICD-10-CM

## 2022-03-14 PROCEDURE — 4004F PT TOBACCO SCREEN RCVD TLK: CPT | Performed by: INTERNAL MEDICINE

## 2022-03-14 PROCEDURE — 99213 OFFICE O/P EST LOW 20 MIN: CPT | Performed by: INTERNAL MEDICINE

## 2022-03-14 PROCEDURE — G8427 DOCREV CUR MEDS BY ELIG CLIN: HCPCS | Performed by: INTERNAL MEDICINE

## 2022-03-14 PROCEDURE — G8484 FLU IMMUNIZE NO ADMIN: HCPCS | Performed by: INTERNAL MEDICINE

## 2022-03-14 PROCEDURE — 3017F COLORECTAL CA SCREEN DOC REV: CPT | Performed by: INTERNAL MEDICINE

## 2022-03-14 PROCEDURE — G8417 CALC BMI ABV UP PARAM F/U: HCPCS | Performed by: INTERNAL MEDICINE

## 2022-03-14 PROCEDURE — 93000 ELECTROCARDIOGRAM COMPLETE: CPT | Performed by: INTERNAL MEDICINE

## 2022-03-14 RX ORDER — DIAZEPAM 10 MG/1
TABLET ORAL
COMMUNITY
Start: 2022-02-15 | End: 2022-05-20

## 2022-03-14 RX ORDER — TRAMADOL HYDROCHLORIDE 50 MG/1
TABLET ORAL
COMMUNITY
Start: 2022-02-22

## 2022-03-14 RX ORDER — DIAZEPAM 5 MG/1
TABLET ORAL
COMMUNITY
Start: 2022-02-22

## 2022-03-14 NOTE — PROGRESS NOTES
Pt here for 1 yr check up needs clearance for L4-5 revision fusion   lay 4/15/22 with     Pt continues with sob ,     EKG dela cruz today

## 2022-03-14 NOTE — PROGRESS NOTES
[address] [facility] [place] [address]  [facility] [address]  Dept: [phone]  Dept Fax: [phone]  Loc: [phone]    Visit Date: 3/14/2022    Ms. Dayanara Gerard is a 64 y.o. female  who presented for:  Chief Complaint   Patient presents with    Check-Up    Cardiac Clearance       HPI:   63 yo F c hx of COPD, Smoker, PTSD, Bipolar, HTN, HLD, and HIV is here for preop risk stratification for back surgery. She had back surgery last year and now she is going for revision. Denies any chest pain, sob, palpitations, lightheadedness, dizziness, orthopnea, PND or pedal edema. Patient states she had a CVA in 06/2021 and was admitted at [facility]. She had mild numbness of the left side.           Current Outpatient Medications:     diazePAM (VALIUM) 10 MG tablet, take 1 tablet by mouth 1 hour PRIOR TO MRI DO NOT DRIVE FOR REST OF DAY, Disp: , Rfl:     diazePAM (VALIUM) 5 MG tablet, take 1 tablet by mouth three times a day if needed, Disp: , Rfl:     traMADol (ULTRAM) 50 MG tablet, take 1 tablet by mouth every 4 hours if needed, Disp: , Rfl:     albuterol sulfate  (90 Base) MCG/ACT inhaler, inhale 2 puffs by mouth every 6 hours if needed for wheezing or shortness of breath, Disp: 8.5 g, Rfl: 3    Budeson-Glycopyrrol-Formoterol (BREZTRI AEROSPHERE) 160-9-4.8 MCG/ACT AERO, Inhale 2 puffs into the lungs 2 times daily, Disp: 10.7 g, Rfl: 11    cetirizine-psuedoephedrine (ZYRTEC-D) 5-120 MG per extended release tablet, Take 1 tablet daily, Disp: 60 tablet, Rfl: 0    albuterol (PROVENTIL) (2.5 MG/3ML) 0.083% nebulizer solution, Take 3 mLs by nebulization every 6 hours as needed for Wheezing or Shortness of Breath, Disp: 120 each, Rfl: 11    mirabegron (MYRBETRIQ) 25 MG TB24, take 1 tablet by mouth once daily, Disp: 30 tablet, Rfl: 11    mirtazapine (REMERON) 30 MG tablet, take 1/2 TO 1 tablet by mouth NIGHTLY if needed, Disp: 30 tablet, Rfl: 11   omeprazole (PRILOSEC) 20 MG delayed release capsule, Take 1 capsule by mouth every morning (before breakfast), Disp: 30 capsule, Rfl: 5    BANOPHEN 25 MG capsule, take 2 capsules by mouth nightly if needed for allergies or sleep, Disp: 60 capsule, Rfl: 3    raNITIdine (ZANTAC 75) 75 MG tablet, Take 1 tablet by mouth 2 times daily, Disp: 180 tablet, Rfl: 3    LATUDA 60 MG TABS tablet, take 1 tablet by mouth every evening WITH DINNER - AT LEAST 350 CALORIES, Disp: , Rfl:     doxepin (SINEQUAN) 100 MG capsule, take 1 capsule by mouth at bedtime if needed for sleep, Disp: , Rfl:     BIKTARVY -25 MG TABS per tablet, take 1 tablet by mouth once daily, Disp: , Rfl:     buPROPion (WELLBUTRIN XL) 150 MG extended release tablet, Take 150 mg by mouth every morning, Disp: , Rfl:     OXYGEN, Inhale 2 L into the lungs nightly, Disp: , Rfl:     oxybutynin (DITROPAN XL) 5 MG extended release tablet, Take 1 tablet by mouth daily, Disp: 30 tablet, Rfl: 11    Past Medical History  Bree Barry  has a past medical history of Acid reflux, Anxiety, Arthritis, Asthma, Bipolar disorder ([address] CAD (coronary artery disease), Cancer ([address] Cerebral artery occlusion with cerebral infarction ([address] Chronic back pain, Chronic bilateral low back pain with bilateral sciatica, Chronic kidney disease, COPD (chronic obstructive pulmonary disease) ([address] Depression, Emphysema of lung (HCC), Headache(784.0), Hearing deficit, Heart disease, HIV disease ([address] Hyperlipidemia, Immune deficiency disorder ([address] Lumbar spondylosis, Lumbar spondylosis, Pleurisy, PONV (postoperative nausea and vomiting), PTSD (post-traumatic stress disorder), Pulmonary nodule, right, Sleep apnea, Sleep apnea, Tobacco abuse, and UTI (lower urinary tract infection). Social History  Bree Barry  reports that she has been smoking cigarettes. She started smoking about 47 years ago. She has a 19.50 pack-year smoking history.  She has never used smokeless tobacco. She reports that she does not drink alcohol and does not use drugs. Family History  Dina Govea family history includes Asthma in her father and mother; Luther Roxane in her father; Heart Disease in her mother; Uterine Cancer in her mother. Past Surgical History   Past Surgical History:   Procedure Laterality Date    CHOLECYSTECTOMY, LAPAROSCOPIC  5/5/16    [place]    COLONOSCOPY  2014    ENDOSCOPY, COLON, DIAGNOSTIC      FOOT SURGERY Bilateral unsure    plates and srews    HERNIA REPAIR  unsure    HYSTERECTOMY  98    JOINT REPLACEMENT  unsure    Right knee    LUMBAR FUSION N/A 3/19/2021    L3-5 DECOMPRESSION, L3-4 POSTERIOR FUSION performed by Wilian Mclaughlin MD at [facility] 26 2014       Subjective:     REVIEW OF SYSTEMS  Constitutional: denies sweats, chills and fever  HENT: denies  congestion, sinus pressure, sneezing and sore throat. Eyes: denies  pain, discharge, redness and itching. Respiratory: denies apnea, cough  Gastrointestinal: denies blood in stool, constipation, diarrhea   Endocrine: denies cold intolerance, heat intolerance, polydipsia. Genitourinary: denies dysuria, enuresis, flank pain and hematuria. Musculoskeletal: denies arthralgias, joint swelling and neck pain. Neurological: denies numbness and headaches. Psychiatric/Behavioral: denies agitation, confusion, decreased concentration and dysphoric mood    All others reviewed and are negative. Objective:     /66   Pulse 90   Ht 5' 4\" (1.626 m)   Wt 183 lb (83 kg)   BMI 31.41 kg/m²     Wt Readings from Last 3 Encounters:   03/14/22 183 lb (83 kg)   10/11/21 178 lb 3.2 oz (80.8 kg)   09/16/21 176 lb 6.4 oz (80 kg)     BP Readings from Last 3 Encounters:   03/14/22 121/66   10/11/21 110/68   09/16/21 90/80       PHYSICAL EXAM  Constitutional: Oriented to person, place, and time. Appears well-developed and well-nourished. HENT:   Head: Normocephalic and atraumatic.    Eyes: EOM are normal. Pupils are equal, round, and reactive to light. Neck: Normal range of motion. Neck supple. No JVD present. Cardiovascular: Normal rate , normal heart sounds and intact distal pulses. Pulmonary/Chest: Effort normal and breath sounds normal. No respiratory distress. No wheezes. No rales. Abdominal: Soft. Bowel sounds are normal. No distension. There is no tenderness. Musculoskeletal: Normal range of motion. No edema. Neurological: Alert and oriented to person, place, and time. No cranial nerve deficit. Coordination normal.   Skin: Skin is warm and dry. Psychiatric: Normal mood and affect.        Lab Results   Component Value Date    CKTOTAL 82 06/22/2012       Lab Results   Component Value Date    WBC 8.9 07/18/2021    RBC 4.88 07/18/2021    RBC 4.48 06/04/2012    HGB 14.6 07/18/2021    HCT 43.1 07/18/2021    MCV 88.2 07/18/2021    MCH 29.9 07/18/2021    MCHC 33.9 07/18/2021    RDW 15.1 07/18/2021     07/18/2021    MPV 9.7 03/09/2021       Lab Results   Component Value Date     07/18/2021    K 3.8 07/18/2021    K 3.9 03/19/2019     07/18/2021    CO2 24 07/18/2021    BUN 10 07/18/2021    LABALBU 4.3 07/18/2021    LABALBU 4.4 06/04/2012    CREATININE 0.73 07/18/2021    CALCIUM 8.90 07/18/2021    LABGLOM 85 03/21/2021    GLUCOSE 94 07/18/2021       Lab Results   Component Value Date    ALKPHOS 138 07/18/2021    ALT 11 07/18/2021    AST 15 07/18/2021    PROT 7.1 07/18/2021    BILITOT 0.4 07/18/2021    BILIDIR <0.2 06/15/2020    LABALBU 4.3 07/18/2021    LABALBU 4.4 06/04/2012       Lab Results   Component Value Date    MG 1.7 02/03/2019       Lab Results   Component Value Date    INR 0.96 07/18/2021    INR 0.92 03/09/2021    INR 0.86 07/24/2018    PROTIME 11.0 07/18/2021         Lab Results   Component Value Date    LABA1C 5.3 05/14/2018       Lab Results   Component Value Date    TRIG 407 06/15/2020    HDL 55 06/15/2020    LDLCALC SEE BELOW 06/15/2020       Lab Results   Component Value Date TSH 0.616 11/18/2020         Testing Reviewed:      I haveindividually reviewed the below cardiac tests    EKG:    ECHO:   Results for orders placed during the hospital encounter of 03/24/16   ECHO Complete 2D W Doppler W Color       STRESS:    CATH:    Assessment/Plan       Diagnosis Orders   1. SOB (shortness of breath) on exertion  EKG 12 lead   2. Preoperative clearance  EKG 12 lead       Preop risk stratification for back surgery on 04/15/22  HTN  HLD  COPD  CVA  Smoker  HIV     Reviewed EKG, no acute findings  Reviewed prior Echo and Cath from 2018  No cardiac symptoms  Good exercise tolerance  Reviewed cardiac cath which has patent coronaries  May proceed with scheduled surgery at low to moderate risk  Hold Aspirin 5 days and resume soon after  Reviewed RAYMOND which has triphasic wave form  Smoking: discussed with the patient the importance of smoke cessation especially with the risk of CAD. Continue Aspirin  The patient is asked to make an attempt to improve diet and exercise patterns to aid in medical management of this problem. Advised patient to call office or seek immediate medical attention if there is any new onset of  any chest pain, sob, palpitations, lightheadedness, dizziness, orthopnea, PND or pedal edema. Thank youfor allowing me to participate in the care of this patient. Please do not hesitate to contact me for any further questions. Return in about 1 year (around 3/14/2023), or if symptoms worsen or fail to improve, for Review testing, Regular follow up.        Electronically signed by Abbie Jeans, MD [facility]  3/14/2022 at 2:51 PM EST

## 2022-03-23 NOTE — PROGRESS NOTES
Pt is going to cancel surgery because she had stroke on right side per pt. I put call into office. I called Antelmo Bedoya at Dr Darshana Granger office and left message to let her know that pt Wilder Quinteros is canceling surgery due to she had stroke on right side. Eduardo Barker

## 2022-03-25 ENCOUNTER — TELEPHONE (OUTPATIENT)
Dept: PULMONOLOGY | Age: 62
End: 2022-03-25

## 2022-03-25 RX ORDER — AMOXICILLIN AND CLAVULANATE POTASSIUM 875; 125 MG/1; MG/1
1 TABLET, FILM COATED ORAL 2 TIMES DAILY
Qty: 20 TABLET | Refills: 0 | Status: SHIPPED | OUTPATIENT
Start: 2022-03-25 | End: 2022-04-04

## 2022-03-25 NOTE — TELEPHONE ENCOUNTER
Patient called into office requesting an antibiotic and steroid. Patient is experiencing  Strong, productive = 1 with thick/yellow, Cough is new/worsening yes, 2 weeks, sinus congestion, Wheezing Yes, present, low grade, 100-101, SOB with exertion, severe migraine. Symptoms started 2 weeks ago. Patient has been taking OTC sinus medication that she does not know the name of. If any medications ordered please send to [facility].

## 2022-03-25 NOTE — TELEPHONE ENCOUNTER
Sent augmentin atb - take for 10 days , call office next week if not getting better.  Go to ER if temp goes above 101

## 2022-03-31 ENCOUNTER — HOSPITAL ENCOUNTER (OUTPATIENT)
Dept: PREADMISSION TESTING | Age: 62
Discharge: HOME | End: 2022-03-31

## 2022-04-19 ENCOUNTER — HOSPITAL ENCOUNTER (OUTPATIENT)
Age: 62
Discharge: HOME | End: 2022-04-19
Payer: COMMERCIAL

## 2022-04-19 ENCOUNTER — OFFICE VISIT (OUTPATIENT)
Dept: PULMONOLOGY | Age: 62
End: 2022-04-19
Payer: COMMERCIAL

## 2022-04-19 VITALS
HEIGHT: 64 IN | OXYGEN SATURATION: 96 % | DIASTOLIC BLOOD PRESSURE: 68 MMHG | WEIGHT: 180.8 LBS | TEMPERATURE: 97.5 F | SYSTOLIC BLOOD PRESSURE: 116 MMHG | HEART RATE: 76 BPM | BODY MASS INDEX: 30.87 KG/M2

## 2022-04-19 DIAGNOSIS — J43.2 CENTRILOBULAR EMPHYSEMA (HCC): ICD-10-CM

## 2022-04-19 DIAGNOSIS — Z01.818 PRE-OP EVALUATION: Primary | ICD-10-CM

## 2022-04-19 DIAGNOSIS — Z21 HIV POSITIVE (HCC): ICD-10-CM

## 2022-04-19 DIAGNOSIS — J44.1 COPD EXACERBATION (HCC): ICD-10-CM

## 2022-04-19 DIAGNOSIS — J44.9 STAGE 3 SEVERE COPD BY GOLD CLASSIFICATION (HCC): ICD-10-CM

## 2022-04-19 DIAGNOSIS — F41.9 ANXIETY AND DEPRESSION: ICD-10-CM

## 2022-04-19 DIAGNOSIS — F17.209 TOBACCO USE DISORDER, CONTINUOUS: ICD-10-CM

## 2022-04-19 DIAGNOSIS — F32.A ANXIETY AND DEPRESSION: ICD-10-CM

## 2022-04-19 PROBLEM — F43.10 POSTTRAUMATIC STRESS DISORDER: Status: ACTIVE | Noted: 2022-04-19

## 2022-04-19 PROBLEM — M54.50 LOW BACK PAIN: Status: ACTIVE | Noted: 2022-04-19

## 2022-04-19 PROBLEM — I25.10 CORONARY ARTERIOSCLEROSIS: Status: ACTIVE | Noted: 2022-04-19

## 2022-04-19 PROBLEM — M48.061 SPINAL STENOSIS OF LUMBAR REGION: Status: ACTIVE | Noted: 2022-04-19

## 2022-04-19 PROBLEM — F31.9 BIPOLAR DISORDER (HCC): Status: ACTIVE | Noted: 2022-04-19

## 2022-04-19 PROBLEM — M19.90 OSTEOARTHRITIS: Status: ACTIVE | Noted: 2022-04-19

## 2022-04-19 PROBLEM — I21.9 MYOCARDIAL INFARCTION (HCC): Status: ACTIVE | Noted: 2022-04-19

## 2022-04-19 PROBLEM — M43.10 ACQUIRED SPONDYLOLISTHESIS: Status: ACTIVE | Noted: 2022-04-19

## 2022-04-19 PROBLEM — I63.9 CEREBROVASCULAR ACCIDENT (CVA) (HCC): Status: ACTIVE | Noted: 2022-04-19

## 2022-04-19 PROBLEM — G95.19 NEUROGENIC CLAUDICATION (HCC): Status: ACTIVE | Noted: 2022-04-19

## 2022-04-19 PROBLEM — R29.818 NEUROGENIC CLAUDICATION: Status: ACTIVE | Noted: 2022-04-19

## 2022-04-19 PROBLEM — M96.0 PSEUDARTHROSIS AFTER FUSION OR ARTHRODESIS: Status: ACTIVE | Noted: 2022-04-19

## 2022-04-19 PROBLEM — M50.30 DEGENERATION OF CERVICAL INTERVERTEBRAL DISC: Status: ACTIVE | Noted: 2022-04-19

## 2022-04-19 LAB
ALBUMIN SERPL-MCNC: 4.1 G/DL (ref 3.5–5.1)
ALP BLD-CCNC: 153 U/L (ref 38–126)
ALT SERPL-CCNC: 13 U/L (ref 11–66)
AST SERPL-CCNC: 14 U/L (ref 5–40)
BASOPHILS # BLD: 1.5 %
BASOPHILS ABSOLUTE: 0.1 THOU/MM3 (ref 0–0.1)
BILIRUB SERPL-MCNC: 0.3 MG/DL (ref 0.3–1.2)
BILIRUBIN DIRECT: < 0.2 MG/DL (ref 0–0.3)
EOSINOPHIL # BLD: 3.9 %
EOSINOPHILS ABSOLUTE: 0.3 THOU/MM3 (ref 0–0.4)
ERYTHROCYTE [DISTWIDTH] IN BLOOD BY AUTOMATED COUNT: 14.5 % (ref 11.5–14.5)
ERYTHROCYTE [DISTWIDTH] IN BLOOD BY AUTOMATED COUNT: 48.6 FL (ref 35–45)
HCT VFR BLD CALC: 40.6 % (ref 37–47)
HEMOGLOBIN: 13 GM/DL (ref 12–16)
IMMATURE GRANS (ABS): 0.01 THOU/MM3 (ref 0–0.07)
IMMATURE GRANULOCYTES: 0.1 %
LYMPHOCYTES # BLD: 32.5 %
LYMPHOCYTES ABSOLUTE: 2.2 THOU/MM3 (ref 1–4.8)
MCH RBC QN AUTO: 29.6 PG (ref 26–33)
MCHC RBC AUTO-ENTMCNC: 32 GM/DL (ref 32.2–35.5)
MCV RBC AUTO: 92.5 FL (ref 81–99)
MONOCYTES # BLD: 7.9 %
MONOCYTES ABSOLUTE: 0.5 THOU/MM3 (ref 0.4–1.3)
NUCLEATED RED BLOOD CELLS: 0 /100 WBC
PLATELET # BLD: 292 THOU/MM3 (ref 130–400)
PMV BLD AUTO: 9.9 FL (ref 9.4–12.4)
RBC # BLD: 4.39 MILL/MM3 (ref 4.2–5.4)
RHEUMATOID FACTOR: < 10 IU/ML (ref 0–13)
SEG NEUTROPHILS: 54.1 %
SEGMENTED NEUTROPHILS ABSOLUTE COUNT: 3.6 THOU/MM3 (ref 1.8–7.7)
TOTAL PROTEIN: 6.9 G/DL (ref 6.1–8)
WBC # BLD: 6.7 THOU/MM3 (ref 4.8–10.8)

## 2022-04-19 PROCEDURE — 86361 T CELL ABSOLUTE COUNT: CPT

## 2022-04-19 PROCEDURE — 3017F COLORECTAL CA SCREEN DOC REV: CPT | Performed by: NURSE PRACTITIONER

## 2022-04-19 PROCEDURE — 36415 COLL VENOUS BLD VENIPUNCTURE: CPT

## 2022-04-19 PROCEDURE — 80076 HEPATIC FUNCTION PANEL: CPT

## 2022-04-19 PROCEDURE — 86430 RHEUMATOID FACTOR TEST QUAL: CPT

## 2022-04-19 PROCEDURE — G8427 DOCREV CUR MEDS BY ELIG CLIN: HCPCS | Performed by: NURSE PRACTITIONER

## 2022-04-19 PROCEDURE — 99214 OFFICE O/P EST MOD 30 MIN: CPT | Performed by: NURSE PRACTITIONER

## 2022-04-19 PROCEDURE — 4004F PT TOBACCO SCREEN RCVD TLK: CPT | Performed by: NURSE PRACTITIONER

## 2022-04-19 PROCEDURE — 85025 COMPLETE CBC W/AUTO DIFF WBC: CPT

## 2022-04-19 PROCEDURE — G8417 CALC BMI ABV UP PARAM F/U: HCPCS | Performed by: NURSE PRACTITIONER

## 2022-04-19 PROCEDURE — 3023F SPIROM DOC REV: CPT | Performed by: NURSE PRACTITIONER

## 2022-04-19 PROCEDURE — 87536 HIV-1 QUANT&REVRSE TRNSCRPJ: CPT

## 2022-04-19 RX ORDER — PREDNISONE 10 MG/1
TABLET ORAL
Qty: 30 TABLET | Refills: 0 | Status: SHIPPED | OUTPATIENT
Start: 2022-04-19 | End: 2022-04-29

## 2022-04-19 ASSESSMENT — ENCOUNTER SYMPTOMS
SHORTNESS OF BREATH: 1
DIARRHEA: 0
BACK PAIN: 1
VOMITING: 0
SINUS PRESSURE: 1
COUGH: 1
NAUSEA: 0
WHEEZING: 1
ABDOMINAL PAIN: 0
EYES NEGATIVE: 1

## 2022-04-19 NOTE — PROGRESS NOTES
[facility]     Patient: Dickson Kwok, 64 y.o.   : 1960    Pt of Dr. Ritika Viveros   Patient presents with    Follow-up     COPD  6 month pulmonary follow up     Other     pre-op clearance for back surgery on 22 with Dr Berry GRIER  Randy Fenton is here for 6 months follow up for COPD   Requests pre op clearance for a second \" back surgery \"  Scheduled for L4-L5 Revision Fusion w/ Fusion ext 2022 with Dr Calvo   Already received clearance from Cardiology   Previous back surgeyr < 1 year ago: She came out of anesthesia without problems , no post PNA , did not require prolonged ventilation     Smoking 0.5 PPD, trying to cut back, not ready to quit   Stage III COPD  Current symptoms: cough w/ mucous yellow , frontal sinus headaches, SOB w/ activity, chronic wheezing. Walks about 0.5 city block on level ground and has to stop   Current Inhalers:  Breztri , Albuterol nebs, Albuterol HFA     Previous Inhalers:Dulera   Last COPD exac - treated outpt with Doxy x 7 day PO 2021    Any recent exacerbations that have required oral atb or steroids No  Any new medical issues since last visit : No    History of RLL nodule. CT chest 2020 showed increase in size from 9 x 6 mm to 15 x 10 mm . Followed up with PET/CT, RLL decreased to 7 mm, activity in area could not be reliably  from that of the adjacent liver. CT chest 2020: interval reduction in size of small right lower lobe nodular density , likely benign  Ct Chest 10/6/2021: stable linear scarlike opacity RLL w/ nodular component , small nodular component decreased in size compared to 2020 , moderate emphysematous changes     Last spirometry:  Severe COPD, FEV 1 34% pre BD, 45% post BD.       Patient is on home oxygen therapy: 96% on RA at rest today   Current Oxygen order: 2LPM w/ sleep   Last 6 min walk in office 2018    SOCIAL HISTORY:  Social History     Tobacco Use    Smoking status: Current Every Day Smoker     Packs/day: 0.50     Years: 39.00     Pack years: 19.50     Types: Cigarettes     Start date: 6/27/1974    Smokeless tobacco: Never Used    Tobacco comment: currently smoking lynnuyt 5 cigarettes per day 12/1/2020   Vaping Use    Vaping Use: Never used   Substance Use Topics    Alcohol use: No     Alcohol/week: 0.0 standard drinks    Drug use: No         CURRENT MEDICATIONS:  Current Outpatient Medications   Medication Sig Dispense Refill    predniSONE (DELTASONE) 10 MG tablet Prednisone 10mg PO. To take 4tab for 3days, then 3tab for 3days, then 2tab for 3days, [id number] for 3days .  30 tablet 0    diazePAM (VALIUM) 10 MG tablet take 1 tablet by mouth 1 hour PRIOR TO MRI DO NOT DRIVE FOR REST OF DAY      diazePAM (VALIUM) 5 MG tablet take 1 tablet by mouth three times a day if needed      traMADol (ULTRAM) 50 MG tablet take 1 tablet by mouth every 4 hours if needed      albuterol sulfate  (90 Base) MCG/ACT inhaler inhale 2 puffs by mouth every 6 hours if needed for wheezing or shortness of breath 8.5 g 3    Budeson-Glycopyrrol-Formoterol (BREZTRI AEROSPHERE) 160-9-4.8 MCG/ACT AERO Inhale 2 puffs into the lungs 2 times daily 10.7 g 11    cetirizine-psuedoephedrine (ZYRTEC-D) 5-120 MG per extended release tablet Take 1 tablet daily 60 tablet 0    albuterol (PROVENTIL) (2.5 MG/3ML) 0.083% nebulizer solution Take 3 mLs by nebulization every 6 hours as needed for Wheezing or Shortness of Breath 120 each 11    mirabegron (MYRBETRIQ) 25 MG TB24 take 1 tablet by mouth once daily 30 tablet 11    mirtazapine (REMERON) 30 MG tablet take 1/2 TO 1 tablet by mouth NIGHTLY if needed 30 tablet 11    omeprazole (PRILOSEC) 20 MG delayed release capsule Take 1 capsule by mouth every morning (before breakfast) 30 capsule 5    BANOPHEN 25 MG capsule take 2 capsules by mouth nightly if needed for allergies or sleep 60 capsule 3  raNITIdine (ZANTAC 75) 75 MG tablet Take 1 tablet by mouth 2 times daily 180 tablet 3    LATUDA 60 MG TABS tablet take 1 tablet by mouth every evening WITH DINNER - AT LEAST 350 CALORIES      doxepin (SINEQUAN) 100 MG capsule take 1 capsule by mouth at bedtime if needed for sleep      BIKTARVY -25 MG TABS per tablet take 1 tablet by mouth once daily      oxybutynin (DITROPAN XL) 5 MG extended release tablet Take 1 tablet by mouth daily 30 tablet 11    buPROPion (WELLBUTRIN XL) 150 MG extended release tablet Take 150 mg by mouth every morning      OXYGEN Inhale 2 L into the lungs nightly       No current facility-administered medications for this visit. Kaleb MEDINA   Review of Systems   Constitutional: Positive for activity change and fatigue. Negative for appetite change, chills, fever and unexpected weight change. HENT: Positive for sinus pressure. Eyes: Negative. Respiratory: Positive for cough, shortness of breath and wheezing. Cardiovascular: Negative for chest pain, palpitations and leg swelling. Gastrointestinal: Negative for abdominal pain, diarrhea, nausea and vomiting. Genitourinary: Negative. Musculoskeletal: Positive for arthralgias, back pain, gait problem and neck pain. Skin: Negative. Neurological: Positive for headaches. Hematological: Negative. Psychiatric/Behavioral: Positive for confusion (memory loss ). Physical exam   /68 (Site: Left Upper Arm, Position: Sitting)   Pulse 76   Temp 97.5 °F (36.4 °C)   Ht 5' 4\" (1.626 m)   Wt 180 lb 12.8 oz (82 kg)   SpO2 96% Comment: on RA  BMI 31.03 kg/m²      Wt Readings from Last 3 Encounters:   04/19/22 180 lb 12.8 oz (82 kg)   03/14/22 183 lb (83 kg)   10/11/21 178 lb 3.2 oz (80.8 kg)     Physical Exam  Vitals and nursing note reviewed. Constitutional:       General: She is not in acute distress. Appearance: She is well-developed. She is obese. HENT:      Mouth/Throat:      Lips: Pink. Mouth: Mucous membranes are moist.      Pharynx: Oropharynx is clear. No oropharyngeal exudate or posterior oropharyngeal erythema. Eyes:      Conjunctiva/sclera: Conjunctivae normal.   Neck:      Vascular: No JVD. Cardiovascular:      Rate and Rhythm: Normal rate and regular rhythm. Heart sounds: No murmur heard. No friction rub. Pulmonary:      Effort: Pulmonary effort is normal. No tachypnea, bradypnea, accessory muscle usage or respiratory distress. Breath sounds: Decreased air movement present. Examination of the right-upper field reveals wheezing. Examination of the left-upper field reveals wheezing. Examination of the right-middle field reveals wheezing. Examination of the left-middle field reveals wheezing. Examination of the right-lower field reveals wheezing. Examination of the left-lower field reveals wheezing. Wheezing present. No rhonchi or rales. Comments: Expiratory wheezes   Chest:      Chest wall: No tenderness. Musculoskeletal:      Right lower leg: No edema. Left lower leg: No edema. Skin:     General: Skin is warm and dry. Capillary Refill: Capillary refill takes less than 2 seconds. Nails: There is no clubbing. Neurological:      Mental Status: She is alert and oriented to person, place, and time. Psychiatric:         Mood and Affect: Mood is anxious. Behavior: Behavior normal.         Thought Content: Thought content normal.         Judgment: Judgment normal.          Test results   Lung Nodule Screening     [x] Qualifies    []Does not qualify   [] Declined    [x] Completed 10/6/2021     CT chest wo contrast 10/6/2021     Impression       1. Stable linear scarlike opacity in the right lower lobe with nodular component inferiorly, with the small nodular component slightly decreased in size compared to 6/15/2020 as evidence for benignity.    2. Moderate emphysematous changes.                   **This report has been created using voice recognition software. It may contain minor errors which are inherent in voice recognition technology. **       Final report electronically signed by Dr. Briana Marroquin MD on 10/6/2021 5:26 PM       Full PFT 4/4/2019            Assessment      Diagnosis Orders   1. Pre-op evaluation     2. Tobacco use disorder, continuous  CT LUNG SCREENING   3. COPD exacerbation (HCC)     4. Stage 3 severe COPD by GOLD classification ([address]     5. HIV positive ([facility]     6. Centrilobular emphysema ([address]     7. Anxiety and depression       Plan    -recommend smoking cessation for improvement in lungs and help with healing from surgery , she unfortunately has not desire to quit and declines any smoking counseling     -From a Pulmonary standpoint patient would be at moderate pulmonary risk due to her COPD/ active smoking   -continue use of Breztri inhaler 2 puffs BID , rinse mouth - take up to surgery and recommend resuming after surgery   -utilize PRN albuterol nebs or HFA for wheezing   -prescribed prednisone taper today due to wheezing through all lung fields on exam today   -surgical risks discussed with patient , agrees to proceed.      -schedule annual Low dose CT chest in 6 months -orders placed     Will see Juan Calles in:6 months  billing based on medical decision making   Electronically signed by JAMEY Cage CNP on 4/19/2022 at 12:21 PM

## 2022-04-19 NOTE — PATIENT INSTRUCTIONS
Patient Education        Stopping Smoking: Care Instructions  Your Care Instructions     Cigarette smokers crave the nicotine in cigarettes. Giving it up is much harder than simply changing a habit. Your body has to stop craving the nicotine. It is hard to quit, but you can do it. There are many tools that people use to quitsmoking. You may find that combining tools works best for you. There are several steps to quitting. First you get ready to quit. Then you get support to help you. After that, you learn new skills and behaviors to become anonsmoker. For many people, a necessary step is getting and using medicine. Your doctor will help you set up the plan that best meets your needs. You may want to attend a smoking cessation program to help you quit smoking. When you choose a program, look for one that has proven success. Ask your doctor for ideas. You will greatly increase your chances of success if you take medicineas well as get counseling or join a cessation program.  Some of the changes you feel when you first quit tobacco are uncomfortable. Your body will miss the nicotine at first, and you may feel short-tempered and grumpy. You may have trouble sleeping or concentrating. Medicine can help you deal with these symptoms. You may struggle with changing your smoking habits and rituals. The last step is the tricky one: Be prepared for the smoking urge to continue for a time. This is a lot to deal with, but keep at it. You willfeel better. Follow-up care is a key part of your treatment and safety. Be sure to make and go to all appointments, and call your doctor if you are having problems. It's also a good idea to know your test results and keep alist of the medicines you take. How can you care for yourself at home?  Ask your family, friends, and coworkers for support. You have a better chance of quitting if you have help and support.    Join a support group, such as Nicotine Anonymous, for people who are trying to quit smoking.  Consider signing up for a smoking cessation program, such as the American Lung Association's Freedom from Smoking program.   Get text messaging support. Go to the website at www.smokefree. gov to sign up for the [facility] program.   Set a quit date. Pick your date carefully so that it is not right in the middle of a big deadline or stressful time. Once you quit, do not even take a puff. Get rid of all ashtrays and lighters after your last cigarette. Clean your house and your clothes so that they do not smell of smoke.  Learn how to be a nonsmoker. Think about ways you can avoid those things that make you reach for a cigarette. ? Avoid situations that put you at greatest risk for smoking. For some people, it is hard to have a drink with friends without smoking. For others, they might skip a coffee break with coworkers who smoke. ? Change your daily routine. Take a different route to work or eat a meal in a different place.  Cut down on stress. Calm yourself or release tension by doing an activity you enjoy, such as reading a book, taking a hot bath, or gardening.  Talk to your doctor or pharmacist about nicotine replacement therapy, which replaces the nicotine in your body. You still get nicotine but you do not use tobacco. Nicotine replacement products help you slowly reduce the amount of nicotine you need. These products come in several forms, many of them available over-the-counter:  ? Nicotine patches  ? Nicotine gum and lozenges  ? Nicotine inhaler   Ask your doctor about bupropion (Wellbutrin) or varenicline (Chantix), which are prescription medicines. They do not contain nicotine. They help you by reducing withdrawal symptoms, such as stress and anxiety.  Some people find hypnosis, acupuncture, and massage helpful for ending the smoking habit.  Eat a healthy diet and get regular exercise.  Having healthy habits will help your body move past its craving for nicotine.  Be prepared to keep trying. Most people are not successful the first few times they try to quit. Do not get mad at yourself if you smoke again. Make a list of things you learned and think about when you want to try again, such as next week, next month, or next year. Where can you learn more? Go to https://[employer].pepiceweb.[employer]. org and sign in to your [employer] account. Enter D285 in the LeBUZZ box to learn more about \"Stopping Smoking: Care Instructions. \"     If you do not have an account, please click on the \"Sign Up Now\" link. Current as of: October 28, 2021               Content Version: 13.2  © 2006-2022 Healthwise, Incorporated. Care instructions adapted under license by [facility] ([facility]). If you have questions about a medical condition or this instruction, always ask your healthcare professional. [address] any warranty or liability for your use of this information.

## 2022-04-20 LAB — CD4 T-HELPER CELLS: NORMAL

## 2022-04-22 LAB
HIV QUANT LOG: < 1.3 LOG CPY/ML
HIV-1 RNA BY PCR, QN: < 20 CPY/ML
HIV-1 RNA BY PCR, QN: DETECTED
SOURCE: ABNORMAL

## 2022-04-26 ENCOUNTER — HOSPITAL ENCOUNTER (OUTPATIENT)
Dept: MRI IMAGING | Age: 62
Discharge: HOME | End: 2022-04-26
Payer: COMMERCIAL

## 2022-04-26 DIAGNOSIS — I63.50 CEREBROVASCULAR ACCIDENT (CVA) DUE TO OCCLUSION OF CEREBRAL ARTERY (HCC): ICD-10-CM

## 2022-04-26 LAB — POC CREATININE WHOLE BLOOD: 0.8 MG/DL (ref 0.5–1.2)

## 2022-04-26 PROCEDURE — 70549 MR ANGIOGRAPH NECK W/O&W/DYE: CPT

## 2022-04-26 PROCEDURE — 6360000004 HC RX CONTRAST MEDICATION: Performed by: PSYCHIATRY & NEUROLOGY

## 2022-04-26 PROCEDURE — 70544 MR ANGIOGRAPHY HEAD W/O DYE: CPT

## 2022-04-26 PROCEDURE — A9579 GAD-BASE MR CONTRAST NOS,1ML: HCPCS | Performed by: PSYCHIATRY & NEUROLOGY

## 2022-04-26 PROCEDURE — 82565 ASSAY OF CREATININE: CPT

## 2022-04-26 RX ADMIN — GADOTERIDOL 20 ML: 279.3 INJECTION, SOLUTION INTRAVENOUS at 14:09

## 2022-05-02 RX ORDER — PREDNISONE 10 MG/1
TABLET ORAL
Qty: 30 TABLET | Refills: 0 | OUTPATIENT
Start: 2022-05-02

## 2022-05-02 NOTE — TELEPHONE ENCOUNTER
Need more information . She should still be taking this. Typically script is for 15 days. Why does she feel she needs more ?

## 2022-05-04 ENCOUNTER — HOSPITAL ENCOUNTER (OUTPATIENT)
Age: 62
Discharge: HOME | End: 2022-05-04
Payer: COMMERCIAL

## 2022-05-04 LAB
ALBUMIN SERPL-MCNC: 4.2 G/DL (ref 3.5–5.1)
ANION GAP SERPL CALCULATED.3IONS-SCNC: 15 MEQ/L (ref 8–16)
BUN BLDV-MCNC: 14 MG/DL (ref 7–22)
CALCIUM SERPL-MCNC: 9.6 MG/DL (ref 8.5–10.5)
CHLORIDE BLD-SCNC: 99 MEQ/L (ref 98–111)
CO2: 23 MEQ/L (ref 23–33)
CREAT SERPL-MCNC: 0.8 MG/DL (ref 0.4–1.2)
GFR SERPL CREATININE-BSD FRML MDRD: 73 ML/MIN/1.73M2
GLUCOSE BLD-MCNC: 62 MG/DL (ref 70–108)
PHOSPHORUS: 4.3 MG/DL (ref 2.4–4.7)
POTASSIUM SERPL-SCNC: 4.6 MEQ/L (ref 3.5–5.2)
SODIUM BLD-SCNC: 137 MEQ/L (ref 135–145)

## 2022-05-04 PROCEDURE — 80069 RENAL FUNCTION PANEL: CPT

## 2022-05-04 PROCEDURE — 36415 COLL VENOUS BLD VENIPUNCTURE: CPT

## 2022-05-20 ENCOUNTER — OFFICE VISIT (OUTPATIENT)
Dept: FAMILY MEDICINE CLINIC | Age: 62
End: 2022-05-20
Payer: COMMERCIAL

## 2022-05-20 VITALS
HEIGHT: 65 IN | DIASTOLIC BLOOD PRESSURE: 80 MMHG | BODY MASS INDEX: 29.32 KG/M2 | HEART RATE: 85 BPM | OXYGEN SATURATION: 94 % | WEIGHT: 176 LBS | SYSTOLIC BLOOD PRESSURE: 122 MMHG

## 2022-05-20 DIAGNOSIS — M47.816 LUMBAR SPONDYLOSIS: ICD-10-CM

## 2022-05-20 DIAGNOSIS — M48.061 DEGENERATIVE LUMBAR SPINAL STENOSIS: Primary | ICD-10-CM

## 2022-05-20 DIAGNOSIS — Z12.31 ENCOUNTER FOR SCREENING MAMMOGRAM FOR MALIGNANT NEOPLASM OF BREAST: ICD-10-CM

## 2022-05-20 DIAGNOSIS — I69.354 HEMIPARESIS AFFECTING LEFT SIDE AS LATE EFFECT OF CEREBROVASCULAR ACCIDENT (CVA) (HCC): ICD-10-CM

## 2022-05-20 PROBLEM — I69.352 HEMIPLEGIA AND HEMIPARESIS FOLLOWING CEREBRAL INFARCTION AFFECTING LEFT DOMINANT SIDE (HCC): Status: ACTIVE | Noted: 2022-05-20

## 2022-05-20 PROCEDURE — 4004F PT TOBACCO SCREEN RCVD TLK: CPT | Performed by: NURSE PRACTITIONER

## 2022-05-20 PROCEDURE — 3017F COLORECTAL CA SCREEN DOC REV: CPT | Performed by: NURSE PRACTITIONER

## 2022-05-20 PROCEDURE — G8417 CALC BMI ABV UP PARAM F/U: HCPCS | Performed by: NURSE PRACTITIONER

## 2022-05-20 PROCEDURE — 99213 OFFICE O/P EST LOW 20 MIN: CPT | Performed by: NURSE PRACTITIONER

## 2022-05-20 PROCEDURE — G8427 DOCREV CUR MEDS BY ELIG CLIN: HCPCS | Performed by: NURSE PRACTITIONER

## 2022-05-20 RX ORDER — DICYCLOMINE HCL 20 MG
TABLET ORAL
COMMUNITY
Start: 2022-05-10

## 2022-05-20 RX ORDER — PANTOPRAZOLE SODIUM 40 MG/1
TABLET, DELAYED RELEASE ORAL
COMMUNITY
Start: 2022-05-10 | End: 2022-08-03 | Stop reason: ALTCHOICE

## 2022-05-20 SDOH — ECONOMIC STABILITY: FOOD INSECURITY: WITHIN THE PAST 12 MONTHS, YOU WORRIED THAT YOUR FOOD WOULD RUN OUT BEFORE YOU GOT MONEY TO BUY MORE.: OFTEN TRUE

## 2022-05-20 SDOH — ECONOMIC STABILITY: FOOD INSECURITY: WITHIN THE PAST 12 MONTHS, THE FOOD YOU BOUGHT JUST DIDN'T LAST AND YOU DIDN'T HAVE MONEY TO GET MORE.: OFTEN TRUE

## 2022-05-20 ASSESSMENT — PATIENT HEALTH QUESTIONNAIRE - PHQ9
SUM OF ALL RESPONSES TO PHQ9 QUESTIONS 1 & 2: 0
SUM OF ALL RESPONSES TO PHQ QUESTIONS 1-9: 0
5. POOR APPETITE OR OVEREATING: 0
SUM OF ALL RESPONSES TO PHQ QUESTIONS 1-9: 0
3. TROUBLE FALLING OR STAYING ASLEEP: 0
4. FEELING TIRED OR HAVING LITTLE ENERGY: 0
6. FEELING BAD ABOUT YOURSELF - OR THAT YOU ARE A FAILURE OR HAVE LET YOURSELF OR YOUR FAMILY DOWN: 0
7. TROUBLE CONCENTRATING ON THINGS, SUCH AS READING THE NEWSPAPER OR WATCHING TELEVISION: 0
2. FEELING DOWN, DEPRESSED OR HOPELESS: 0
SUM OF ALL RESPONSES TO PHQ QUESTIONS 1-9: 0
1. LITTLE INTEREST OR PLEASURE IN DOING THINGS: 0
10. IF YOU CHECKED OFF ANY PROBLEMS, HOW DIFFICULT HAVE THESE PROBLEMS MADE IT FOR YOU TO DO YOUR WORK, TAKE CARE OF THINGS AT HOME, OR GET ALONG WITH OTHER PEOPLE: 0
9. THOUGHTS THAT YOU WOULD BE BETTER OFF DEAD, OR OF HURTING YOURSELF: 0
8. MOVING OR SPEAKING SO SLOWLY THAT OTHER PEOPLE COULD HAVE NOTICED. OR THE OPPOSITE, BEING SO FIGETY OR RESTLESS THAT YOU HAVE BEEN MOVING AROUND A LOT MORE THAN USUAL: 0
SUM OF ALL RESPONSES TO PHQ QUESTIONS 1-9: 0

## 2022-05-20 ASSESSMENT — SOCIAL DETERMINANTS OF HEALTH (SDOH): HOW HARD IS IT FOR YOU TO PAY FOR THE VERY BASICS LIKE FOOD, HOUSING, MEDICAL CARE, AND HEATING?: NOT HARD AT ALL
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
## 2022-05-27 RX ORDER — ASPIRIN 325 MG
TABLET ORAL
Qty: 60 TABLET | Refills: 6 | Status: SHIPPED | OUTPATIENT
Start: 2022-05-27

## 2022-07-18 ENCOUNTER — TELEPHONE (OUTPATIENT)
Dept: FAMILY MEDICINE CLINIC | Age: 62
End: 2022-07-18

## 2022-07-18 NOTE — TELEPHONE ENCOUNTER
I am fine to do that, but it would be best to do a video visit with her. Is it possible we could call [employer] first and see what they really need? [phone].

## 2022-07-18 NOTE — TELEPHONE ENCOUNTER
Patient reports that Yokasta informed her that there needs to be a conference call to include provider, Yokasta and her and she would like to get this set up as soon as possible so that she is able to get her new scooter    Please advise

## 2022-07-19 NOTE — TELEPHONE ENCOUNTER
Spoke to pt  VV set up   Future Appointments   Date Time Provider Bertin Lopezi   7/26/2022  2:00 PM Ashley Wright, APRN - [id number] [facility] [place] [facility]   10/17/2022  1:00 PM STR CT IMAGING RM1  [facility] OUT EXP STR Radiolog   10/24/2022  1:00 PM Rommel Byrd, APRN - CNP N Pulm Med [address]   3/27/2023  8:00 AM Jazmyn Rushing MD N SRP[facility] [place]

## 2022-07-26 DIAGNOSIS — I69.354 HEMIPARESIS AFFECTING LEFT SIDE AS LATE EFFECT OF CEREBROVASCULAR ACCIDENT (CVA) (HCC): ICD-10-CM

## 2022-07-26 DIAGNOSIS — M48.061 DEGENERATIVE LUMBAR SPINAL STENOSIS: Primary | ICD-10-CM

## 2022-07-26 DIAGNOSIS — M47.816 LUMBAR SPONDYLOSIS: ICD-10-CM

## 2022-08-02 ENCOUNTER — TELEPHONE (OUTPATIENT)
Dept: FAMILY MEDICINE CLINIC | Age: 62
End: 2022-08-02

## 2022-08-02 DIAGNOSIS — H10.9 CONJUNCTIVITIS OF BOTH EYES, UNSPECIFIED CONJUNCTIVITIS TYPE: ICD-10-CM

## 2022-08-02 DIAGNOSIS — D72.829 LEUKOCYTOSIS, UNSPECIFIED TYPE: Primary | ICD-10-CM

## 2022-08-02 DIAGNOSIS — K21.9 GASTROESOPHAGEAL REFLUX DISEASE, UNSPECIFIED WHETHER ESOPHAGITIS PRESENT: ICD-10-CM

## 2022-08-02 NOTE — TELEPHONE ENCOUNTER
Can we clarify why she needs the polytrim eye drops? I also have Omeprazole and Pantoprazole on her med list, which one is she taking? As far as lab work for infection goes, i'll order a CBC.

## 2022-08-02 NOTE — TELEPHONE ENCOUNTER
Patient requesting trimethoprim-polymyxin b (POLYTRIM) 38443-9.1 UNIT/ML-% ophthalmic solution.  Patient is also states that she is currently on dicycloine 20 mg po TID and it is not helping her at all and requests a new rx for omeprazole all to be sent to [facility]    Patient also states that she had back surgery back in May and states that she was informed that she had 2 different infections and that she had never been informed of this and she wanted to know if you would be able to get this information from the blood work that was done at [facility] in May 2022  I informed that patient that I couldn't see were there was infection noted from the blood work in may and she stated that it was never documented and that she couldn't remember the names of the 2 infections because she was told while she was coming out of anesthesia   Patient would like to know if you could order blood work to check for infection but she's not sure what infection to check for    Please advise

## 2022-08-03 RX ORDER — OMEPRAZOLE 20 MG/1
20 CAPSULE, DELAYED RELEASE ORAL 2 TIMES DAILY
Qty: 180 CAPSULE | Refills: 1 | Status: SHIPPED | OUTPATIENT
Start: 2022-08-03

## 2022-08-03 RX ORDER — POLYMYXIN B SULFATE AND TRIMETHOPRIM 1; 10000 MG/ML; [USP'U]/ML
1 SOLUTION OPHTHALMIC EVERY 4 HOURS
Qty: 1 EACH | Refills: 0 | Status: SHIPPED | OUTPATIENT
Start: 2022-08-03 | End: 2022-08-13

## 2022-08-03 NOTE — TELEPHONE ENCOUNTER
Pt states the polytrim is for the infection in both of her eyes   Sxs started 1.5 weeks ago they are red,itchy,yellow drainage, and they burn when looking at the sun    Pt is not taking omeprazole or pantoprazole, she is take dicyclomine which isn't helping    She would like omeprazole 20 mg BID  Pharmacy R/A market

## 2022-08-16 ENCOUNTER — HOSPITAL ENCOUNTER (OUTPATIENT)
Dept: OCCUPATIONAL THERAPY | Age: 62
Setting detail: THERAPIES SERIES
Discharge: HOME | End: 2022-08-16
Payer: COMMERCIAL

## 2022-08-16 ENCOUNTER — HOSPITAL ENCOUNTER (OUTPATIENT)
Age: 62
Discharge: HOME | End: 2022-08-16
Payer: COMMERCIAL

## 2022-08-16 DIAGNOSIS — D72.829 LEUKOCYTOSIS, UNSPECIFIED TYPE: ICD-10-CM

## 2022-08-16 LAB
BASOPHILS # BLD: 1.4 %
BASOPHILS ABSOLUTE: 0.1 THOU/MM3 (ref 0–0.1)
EOSINOPHIL # BLD: 1.2 %
EOSINOPHILS ABSOLUTE: 0.1 THOU/MM3 (ref 0–0.4)
ERYTHROCYTE [DISTWIDTH] IN BLOOD BY AUTOMATED COUNT: 14.3 % (ref 11.5–14.5)
ERYTHROCYTE [DISTWIDTH] IN BLOOD BY AUTOMATED COUNT: 49.1 FL (ref 35–45)
HCT VFR BLD CALC: 45.8 % (ref 37–47)
HEMOGLOBIN: 14.2 GM/DL (ref 12–16)
IMMATURE GRANS (ABS): 0.03 THOU/MM3 (ref 0–0.07)
IMMATURE GRANULOCYTES: 0.3 %
LYMPHOCYTES # BLD: 25.6 %
LYMPHOCYTES ABSOLUTE: 2.5 THOU/MM3 (ref 1–4.8)
MCH RBC QN AUTO: 29.2 PG (ref 26–33)
MCHC RBC AUTO-ENTMCNC: 31 GM/DL (ref 32.2–35.5)
MCV RBC AUTO: 94.2 FL (ref 81–99)
MONOCYTES # BLD: 6.2 %
MONOCYTES ABSOLUTE: 0.6 THOU/MM3 (ref 0.4–1.3)
NUCLEATED RED BLOOD CELLS: 0 /100 WBC
PLATELET # BLD: 330 THOU/MM3 (ref 130–400)
PMV BLD AUTO: 9.5 FL (ref 9.4–12.4)
RBC # BLD: 4.86 MILL/MM3 (ref 4.2–5.4)
SEG NEUTROPHILS: 65.3 %
SEGMENTED NEUTROPHILS ABSOLUTE COUNT: 6.3 THOU/MM3 (ref 1.8–7.7)
WBC # BLD: 9.7 THOU/MM3 (ref 4.8–10.8)

## 2022-08-16 PROCEDURE — 85025 COMPLETE CBC W/AUTO DIFF WBC: CPT

## 2022-08-16 PROCEDURE — 97166 OT EVAL MOD COMPLEX 45 MIN: CPT
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
## 2022-08-17 ENCOUNTER — TELEPHONE (OUTPATIENT)
Dept: FAMILY MEDICINE CLINIC | Age: 62
End: 2022-08-17

## 2022-09-06 ENCOUNTER — TELEPHONE (OUTPATIENT)
Dept: PULMONOLOGY | Age: 62
End: 2022-09-06

## 2022-09-06 DIAGNOSIS — J44.1 COPD EXACERBATION (HCC): Primary | ICD-10-CM

## 2022-09-06 NOTE — TELEPHONE ENCOUNTER
Patient was last seen 4/19/2022 and her next appt is 10/24/2022 with Lazarus Gasmen. She said back in May she had back surgery at [facility] and they told her she had \"2 types of infection related to her sinuses and lungs and saliva pockets\", she is not sure the names. She said it was never documented in her chart and she was never prescribed any antibiotics at discharge for it either, but they found it in her blood. She is asking for a different prescription to fight her symptoms,  she always has yellow pus in her eyes and she is coughing up yellow phlegm, she always has a fever, hot and cold chills, always has a runny nose with yellow phlegm and has been having these symptoms for awhile. She said the antibiotics she has been prescribed in the past do not help. Pharmacy is giftee, please call her to discuss or if needs seen for sooner appt.

## 2022-09-12 ENCOUNTER — TELEPHONE (OUTPATIENT)
Dept: PULMONOLOGY | Age: 62
End: 2022-09-12

## 2022-09-12 DIAGNOSIS — F17.209 TOBACCO USE DISORDER, CONTINUOUS: ICD-10-CM

## 2022-09-12 DIAGNOSIS — H57.89 EYE DRAINAGE: ICD-10-CM

## 2022-09-12 DIAGNOSIS — R50.9 FEVER AND CHILLS: Primary | ICD-10-CM

## 2022-09-12 DIAGNOSIS — R05.8 PRODUCTIVE COUGH: ICD-10-CM

## 2022-09-12 DIAGNOSIS — Z21 HIV POSITIVE (HCC): ICD-10-CM

## 2022-09-12 NOTE — TELEPHONE ENCOUNTER
DOLV=04-19-22. DONV=10-24-22. Tammy Gardner is calling to get an antibiotic, she 09-06 PE. She states she has symptoms of yellow pus in her eyes, coughing up yellow phlegm, hot and cold chills, runny nose with yellow phlegm, achy headaches, sinus pressure, Fever ,and she something about her saliva pockets. She uses [facility]. I asked her if she was allergic to any meds, and she said to look in her chart, she didn't remember.

## 2022-09-13 NOTE — TELEPHONE ENCOUNTER
Recommend ER or UC visit due to symptoms. If not comfortable going to be seen I did place orders for COVID and Flu testing along with CXR that can be done outpatient. I need these before determining atb therapy yudith since she said she has not had relief from atb in past.   Does she follow with Infectious disease for her HIV ? If not with symptoms she is reporting I recommend referral to ID as well.

## 2022-09-14 NOTE — TELEPHONE ENCOUNTER
She is following infectious disease Dr. Jayleen Wu in [facility] for HIV she is going to get outpatient [facility] to get CXR and is going to get labs done Tuesday pt has stated okay no concerns or questions at this time

## 2022-09-16 ENCOUNTER — HOSPITAL ENCOUNTER (OUTPATIENT)
Age: 62
Discharge: HOME | End: 2022-09-16
Payer: COMMERCIAL

## 2022-09-16 LAB
ALBUMIN SERPL-MCNC: 4.2 G/DL (ref 3.5–5.1)
ALP BLD-CCNC: 182 U/L (ref 38–126)
ALT SERPL-CCNC: 15 U/L (ref 11–66)
ANION GAP SERPL CALCULATED.3IONS-SCNC: 16 MEQ/L (ref 8–16)
AST SERPL-CCNC: 16 U/L (ref 5–40)
BASOPHILS # BLD: 0.9 %
BASOPHILS ABSOLUTE: 0.1 THOU/MM3 (ref 0–0.1)
BILIRUB SERPL-MCNC: 0.3 MG/DL (ref 0.3–1.2)
BILIRUBIN DIRECT: < 0.2 MG/DL (ref 0–0.3)
BUN BLDV-MCNC: 15 MG/DL (ref 7–22)
CALCIUM SERPL-MCNC: 9.7 MG/DL (ref 8.5–10.5)
CHLORIDE BLD-SCNC: 101 MEQ/L (ref 98–111)
CHOLESTEROL, TOTAL: 252 MG/DL (ref 100–199)
CO2: 23 MEQ/L (ref 23–33)
CREAT SERPL-MCNC: 0.8 MG/DL (ref 0.4–1.2)
EOSINOPHIL # BLD: 1.5 %
EOSINOPHILS ABSOLUTE: 0.1 THOU/MM3 (ref 0–0.4)
ERYTHROCYTE [DISTWIDTH] IN BLOOD BY AUTOMATED COUNT: 14.8 % (ref 11.5–14.5)
ERYTHROCYTE [DISTWIDTH] IN BLOOD BY AUTOMATED COUNT: 50.6 FL (ref 35–45)
GFR SERPL CREATININE-BSD FRML MDRD: 73 ML/MIN/1.73M2
GLUCOSE BLD-MCNC: 96 MG/DL (ref 70–108)
HCT VFR BLD CALC: 45 % (ref 37–47)
HDLC SERPL-MCNC: 51 MG/DL
HEMOGLOBIN: 14.2 GM/DL (ref 12–16)
IMMATURE GRANS (ABS): 0.04 THOU/MM3 (ref 0–0.07)
IMMATURE GRANULOCYTES: 0.5 %
INFLUENZA A: NOT DETECTED
INFLUENZA B: NOT DETECTED
LDL CHOLESTEROL CALCULATED: 128 MG/DL
LYMPHOCYTES # BLD: 27.2 %
LYMPHOCYTES ABSOLUTE: 2.1 THOU/MM3 (ref 1–4.8)
MCH RBC QN AUTO: 29.3 PG (ref 26–33)
MCHC RBC AUTO-ENTMCNC: 31.6 GM/DL (ref 32.2–35.5)
MCV RBC AUTO: 93 FL (ref 81–99)
MONOCYTES # BLD: 6.9 %
MONOCYTES ABSOLUTE: 0.5 THOU/MM3 (ref 0.4–1.3)
NUCLEATED RED BLOOD CELLS: 0 /100 WBC
PHOSPHORUS: 3.6 MG/DL (ref 2.4–4.7)
PLATELET # BLD: 300 THOU/MM3 (ref 130–400)
PMV BLD AUTO: 9.7 FL (ref 9.4–12.4)
POTASSIUM SERPL-SCNC: 4.4 MEQ/L (ref 3.5–5.2)
RBC # BLD: 4.84 MILL/MM3 (ref 4.2–5.4)
SARS-COV-2 RNA, RT PCR: NOT DETECTED
SEG NEUTROPHILS: 63 %
SEGMENTED NEUTROPHILS ABSOLUTE COUNT: 4.9 THOU/MM3 (ref 1.8–7.7)
SODIUM BLD-SCNC: 140 MEQ/L (ref 135–145)
TOTAL PROTEIN: 7.1 G/DL (ref 6.1–8)
TRIGL SERPL-MCNC: 366 MG/DL (ref 0–199)
WBC # BLD: 7.8 THOU/MM3 (ref 4.8–10.8)

## 2022-09-16 PROCEDURE — 84460 ALANINE AMINO (ALT) (SGPT): CPT

## 2022-09-16 PROCEDURE — 85025 COMPLETE CBC W/AUTO DIFF WBC: CPT

## 2022-09-16 PROCEDURE — 87536 HIV-1 QUANT&REVRSE TRNSCRPJ: CPT

## 2022-09-16 PROCEDURE — 80069 RENAL FUNCTION PANEL: CPT

## 2022-09-16 PROCEDURE — 84155 ASSAY OF PROTEIN SERUM: CPT

## 2022-09-16 PROCEDURE — 84075 ASSAY ALKALINE PHOSPHATASE: CPT

## 2022-09-16 PROCEDURE — 36415 COLL VENOUS BLD VENIPUNCTURE: CPT

## 2022-09-16 PROCEDURE — 87636 SARSCOV2 & INF A&B AMP PRB: CPT

## 2022-09-16 PROCEDURE — 80061 LIPID PANEL: CPT

## 2022-09-16 PROCEDURE — 82248 BILIRUBIN DIRECT: CPT

## 2022-09-16 PROCEDURE — 86361 T CELL ABSOLUTE COUNT: CPT

## 2022-09-16 PROCEDURE — 82247 BILIRUBIN TOTAL: CPT

## 2022-09-16 PROCEDURE — 84450 TRANSFERASE (AST) (SGOT): CPT

## 2022-09-18 LAB — CD4 T-HELPER CELLS: NORMAL

## 2022-09-19 RX ORDER — DOXYCYCLINE HYCLATE 100 MG/1
100 CAPSULE ORAL 2 TIMES DAILY
Qty: 14 CAPSULE | Refills: 0 | Status: SHIPPED | OUTPATIENT
Start: 2022-09-19 | End: 2022-09-26

## 2022-09-19 NOTE — TELEPHONE ENCOUNTER
CXR never completed,  COVID and Flu neg. Sent in Doxy antibiotics x 7 days for COPD exacerbation.   She should also notify her Infectious disease physician of symptoms

## 2022-09-20 LAB
HIV INTERPRETATION: DETECTED
HIV-1 QNT LOG, IU/ML: < 1.47 LOG CPY/ML
HIV-1 QNT, IU/ML: ABNORMAL CPY/ML

## 2022-09-22 ENCOUNTER — NURSE ONLY (OUTPATIENT)
Dept: LAB | Age: 62
End: 2022-09-22

## 2022-09-22 LAB
BILIRUBIN URINE: NEGATIVE
BLOOD, URINE: NEGATIVE
CHARACTER, URINE: CLEAR
COLOR: YELLOW
GLUCOSE, URINE: NEGATIVE MG/DL
KETONES, URINE: NEGATIVE
LEUKOCYTE EST, POC: NEGATIVE
NITRITE, URINE: NEGATIVE
PH UA: 6 (ref 5–9)
PROTEIN UA: NEGATIVE MG/DL
SPECIFIC GRAVITY UA: 1.01 (ref 1–1.03)
UROBILINOGEN, URINE: 0.2 EU/DL (ref 0–1)

## 2022-09-24 LAB
ORGANISM: ABNORMAL
URINE CULTURE, ROUTINE: ABNORMAL

## 2022-09-25 LAB
APTIMA MEDIA TYPE: NORMAL
T. VAGINALIS SPECIMEN SOURCE: NORMAL
TRICHOMONAS VAGINALIS BY NAA: NEGATIVE

## 2022-09-26 LAB
C. TRACHOMATIS DNA,THIN PREP: NEGATIVE
N. GONORRHOEAE DNA, THIN PREP: NEGATIVE
SOURCE: NORMAL

## 2022-10-05 LAB — CYTOLOGY THIN PREP PAP: NORMAL

## 2022-10-17 DIAGNOSIS — J44.9 STAGE 3 SEVERE COPD BY GOLD CLASSIFICATION (HCC): ICD-10-CM

## 2022-10-20 RX ORDER — BUDESONIDE, GLYCOPYRROLATE, AND FORMOTEROL FUMARATE 160; 9; 4.8 UG/1; UG/1; UG/1
AEROSOL, METERED RESPIRATORY (INHALATION)
Qty: 10.7 G | Refills: 11 | Status: SHIPPED | OUTPATIENT
Start: 2022-10-20

## 2022-10-20 NOTE — TELEPHONE ENCOUNTER
Received refill request for Breztri. Medication was last ordered by clara. Medication was last ordered on 10/11/21 with 11 refills. Patient was last seen in the office 4/19/22. Patient has a scheduled follow up 1/23/23. Medication needs to be sent to [address] [place] [address] [id number] - [place], [address] [place] [address] Pharmacy.

## 2022-10-20 NOTE — TELEPHONE ENCOUNTER
Patient calling in regarding this also. She is also asking for another antibiotic for her infections (see previous phone encounters).

## 2022-10-31 ENCOUNTER — TELEPHONE (OUTPATIENT)
Dept: FAMILY MEDICINE CLINIC | Age: 62
End: 2022-10-31

## 2022-10-31 NOTE — TELEPHONE ENCOUNTER
----- Message from Spenser Rahman sent at 10/31/2022 10:11 AM EDT -----  Subject: Message to Provider    QUESTIONS  Information for Provider? FYDEBO? Patient called and stated that her   , Heavenly Boles is going to be faxing over paperwork that   states she needs a walk in shower instead of a tub.  ---------------------------------------------------------------------------  --------------  Last [facility]  [phone]; OK to leave message on voicemail  ---------------------------------------------------------------------------  --------------  SCRIPT ANSWERS  Relationship to Patient?  Self

## 2022-11-28 ENCOUNTER — NURSE ONLY (OUTPATIENT)
Dept: LAB | Age: 62
End: 2022-11-28

## 2023-01-10 ENCOUNTER — TELEPHONE (OUTPATIENT)
Dept: PULMONOLOGY | Age: 63
End: 2023-01-10

## 2023-01-10 DIAGNOSIS — R05.8 PRODUCTIVE COUGH: ICD-10-CM

## 2023-01-10 DIAGNOSIS — Z21 HIV POSITIVE (HCC): ICD-10-CM

## 2023-01-10 DIAGNOSIS — J44.1 COPD WITH ACUTE EXACERBATION (HCC): Primary | ICD-10-CM

## 2023-01-10 DIAGNOSIS — R50.9 FEVER AND CHILLS: ICD-10-CM

## 2023-01-10 NOTE — TELEPHONE ENCOUNTER
Patient is calling office to get an antibiotic. Patient states she has this recurrent infection going on that \"she's been dealing with for years\". States she's coughing up a lot of yellow phlegm, runny nose, fever of 103, hot and cold chills, sinus pressure, chest tightness, and hard to breathe at times. Patient states she takes cold showers to help with fevers and does use Breztri inhaler as prescribed, but states it works well but doesn't seem like it's enough for her. Patient also informed me that she has an infection in her right eye. States it's red and swollen with some brown drainage, she used warm compress to help. Patient is scheduled for follow up 1/23/23. Any script can be sent to [facility]. Please advise.

## 2023-01-11 RX ORDER — PREDNISONE 20 MG/1
40 TABLET ORAL DAILY
Qty: 20 TABLET | Refills: 0 | Status: SHIPPED | OUTPATIENT
Start: 2023-01-11 | End: 2023-01-21

## 2023-01-11 RX ORDER — DOXYCYCLINE HYCLATE 100 MG/1
100 CAPSULE ORAL 2 TIMES DAILY
Qty: 14 CAPSULE | Refills: 0 | Status: SHIPPED | OUTPATIENT
Start: 2023-01-11 | End: 2023-01-18

## 2023-01-11 NOTE — TELEPHONE ENCOUNTER
I spoke to patient and advised her that Connie had sent in doxycycline hyclate (VIBRAMYCIN) 100 MG and Prednisone to the pharmacy and that she needs to get her chest xray done with testing for covid/flu and RSV and patient sated she took a home covid test which was Neg but I did advised patient that Connie wrote in a order to have her get those doesn't due to her Symptoms  and that if she get any worse to go to the ER. Patient voiced her understanding. Stated she will have to set up a ride to get these all done and she will go next Monday.

## 2023-01-11 NOTE — TELEPHONE ENCOUNTER
Sent in antibiotic and steroids , I would like patient get CXR and swab to rule out COVID/ Flu and RSV with her symptoms. - orders placed.    Pt should wear mask when in public to get testing / meds   Go to ER if symptoms get worse

## 2023-01-18 ENCOUNTER — HOSPITAL ENCOUNTER (OUTPATIENT)
Dept: GENERAL RADIOLOGY | Age: 63
Discharge: HOME | End: 2023-01-18
Payer: COMMERCIAL

## 2023-01-18 ENCOUNTER — HOSPITAL ENCOUNTER (OUTPATIENT)
Age: 63
Discharge: HOME | End: 2023-01-18
Payer: COMMERCIAL

## 2023-01-18 DIAGNOSIS — Z21 HIV POSITIVE (HCC): ICD-10-CM

## 2023-01-18 DIAGNOSIS — R50.9 FEVER AND CHILLS: ICD-10-CM

## 2023-01-18 DIAGNOSIS — J44.1 COPD WITH ACUTE EXACERBATION (HCC): ICD-10-CM

## 2023-01-18 DIAGNOSIS — R05.8 PRODUCTIVE COUGH: ICD-10-CM

## 2023-01-18 LAB
HEPATITIS B SURFACE ANTIGEN: NEGATIVE
HEPATITIS C ANTIBODY: NEGATIVE
RPR: NONREACTIVE

## 2023-01-18 PROCEDURE — 86803 HEPATITIS C AB TEST: CPT

## 2023-01-18 PROCEDURE — 36415 COLL VENOUS BLD VENIPUNCTURE: CPT

## 2023-01-18 PROCEDURE — 86592 SYPHILIS TEST NON-TREP QUAL: CPT

## 2023-01-18 PROCEDURE — 87340 HEPATITIS B SURFACE AG IA: CPT

## 2023-01-18 PROCEDURE — 71046 X-RAY EXAM CHEST 2 VIEWS: CPT

## 2023-01-27 ENCOUNTER — TELEPHONE (OUTPATIENT)
Dept: FAMILY MEDICINE CLINIC | Age: 63
End: 2023-01-27

## 2023-01-27 ENCOUNTER — OFFICE VISIT (OUTPATIENT)
Dept: FAMILY MEDICINE CLINIC | Age: 63
End: 2023-01-27
Payer: COMMERCIAL

## 2023-01-27 VITALS
TEMPERATURE: 98.1 F | OXYGEN SATURATION: 94 % | BODY MASS INDEX: 31.46 KG/M2 | DIASTOLIC BLOOD PRESSURE: 80 MMHG | RESPIRATION RATE: 18 BRPM | HEART RATE: 96 BPM | HEIGHT: 65 IN | WEIGHT: 188.8 LBS | SYSTOLIC BLOOD PRESSURE: 144 MMHG

## 2023-01-27 DIAGNOSIS — M48.061 DEGENERATIVE LUMBAR SPINAL STENOSIS: Primary | ICD-10-CM

## 2023-01-27 DIAGNOSIS — B20 HIV INFECTION, UNSPECIFIED SYMPTOM STATUS (HCC): ICD-10-CM

## 2023-01-27 DIAGNOSIS — B99.9 INFECTION: ICD-10-CM

## 2023-01-27 PROCEDURE — G8484 FLU IMMUNIZE NO ADMIN: HCPCS | Performed by: NURSE PRACTITIONER

## 2023-01-27 PROCEDURE — G8417 CALC BMI ABV UP PARAM F/U: HCPCS | Performed by: NURSE PRACTITIONER

## 2023-01-27 PROCEDURE — 3017F COLORECTAL CA SCREEN DOC REV: CPT | Performed by: NURSE PRACTITIONER

## 2023-01-27 PROCEDURE — 4004F PT TOBACCO SCREEN RCVD TLK: CPT | Performed by: NURSE PRACTITIONER

## 2023-01-27 PROCEDURE — 99214 OFFICE O/P EST MOD 30 MIN: CPT | Performed by: NURSE PRACTITIONER

## 2023-01-27 PROCEDURE — G8427 DOCREV CUR MEDS BY ELIG CLIN: HCPCS | Performed by: NURSE PRACTITIONER

## 2023-01-27 RX ORDER — DOXYCYCLINE HYCLATE 100 MG
100 TABLET ORAL 2 TIMES DAILY
Qty: 20 TABLET | Refills: 0 | Status: SHIPPED | OUTPATIENT
Start: 2023-01-27 | End: 2023-02-06

## 2023-01-27 ASSESSMENT — PATIENT HEALTH QUESTIONNAIRE - PHQ9
SUM OF ALL RESPONSES TO PHQ QUESTIONS 1-9: 4
8. MOVING OR SPEAKING SO SLOWLY THAT OTHER PEOPLE COULD HAVE NOTICED. OR THE OPPOSITE, BEING SO FIGETY OR RESTLESS THAT YOU HAVE BEEN MOVING AROUND A LOT MORE THAN USUAL: 1
SUM OF ALL RESPONSES TO PHQ QUESTIONS 1-9: 4
7. TROUBLE CONCENTRATING ON THINGS, SUCH AS READING THE NEWSPAPER OR WATCHING TELEVISION: 1
5. POOR APPETITE OR OVEREATING: 0
SUM OF ALL RESPONSES TO PHQ QUESTIONS 1-9: 4
SUM OF ALL RESPONSES TO PHQ9 QUESTIONS 1 & 2: 0
SUM OF ALL RESPONSES TO PHQ QUESTIONS 1-9: 4
2. FEELING DOWN, DEPRESSED OR HOPELESS: 0
6. FEELING BAD ABOUT YOURSELF - OR THAT YOU ARE A FAILURE OR HAVE LET YOURSELF OR YOUR FAMILY DOWN: 0
4. FEELING TIRED OR HAVING LITTLE ENERGY: 1
3. TROUBLE FALLING OR STAYING ASLEEP: 1
9. THOUGHTS THAT YOU WOULD BE BETTER OFF DEAD, OR OF HURTING YOURSELF: 0
1. LITTLE INTEREST OR PLEASURE IN DOING THINGS: 0
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
## 2023-01-27 NOTE — TELEPHONE ENCOUNTER
Left message on answering machine requesting Tristin Alvarez to  call back at her earliest convenience.

## 2023-01-27 NOTE — TELEPHONE ENCOUNTER
Patient reports that she needs a referral for [facility], [facility] services and a referral to [employer] [phone], fax [phone]. She is working with Caron Acevedo at [employer] [phone], phone [phone]. I am confused about what all she needs, and I think she might be confused as well as I don't generally refer to [facility] services or to [employer]. Can we call Caron Acevedo and see what Ailin Dunn needs? Thanks!

## 2023-01-30 RX ORDER — GUAIFENESIN 600 MG/1
TABLET, EXTENDED RELEASE ORAL
Qty: 60 TABLET | Refills: 6 | Status: SHIPPED | OUTPATIENT
Start: 2023-01-30

## 2023-01-30 NOTE — TELEPHONE ENCOUNTER
Received refill request for mucus relief. Medication was last ordered by clara. Medication was last ordered on 5/27/22 with 6 refills. Patient was last seen in the office 4/19/22. Does patient have a scheduled follow up?: yes - 3/7/23    Medication needs to be sent to [address] [place] [address] [address][id number] - [place], [address] [facility] [phone] - [phone]. Thank you, please advise! Patient's Allergies:   Allergies   Allergen Reactions    Biaxin [Clarithromycin] Swelling    Levofloxacin Swelling    Vancomycin Swelling

## 2023-01-31 ENCOUNTER — TELEPHONE (OUTPATIENT)
Dept: FAMILY MEDICINE CLINIC | Age: 63
End: 2023-01-31

## 2023-01-31 DIAGNOSIS — J01.90 ACUTE RHINOSINUSITIS: Primary | ICD-10-CM

## 2023-01-31 PROBLEM — R06.2 WHEEZING ON BOTH SIDES OF CHEST: Status: RESOLVED | Noted: 2020-12-22 | Resolved: 2023-01-31

## 2023-01-31 PROBLEM — F43.10 POSTTRAUMATIC STRESS DISORDER: Status: RESOLVED | Noted: 2022-04-19 | Resolved: 2023-01-31

## 2023-01-31 PROBLEM — M54.50 LOW BACK PAIN: Status: RESOLVED | Noted: 2022-04-19 | Resolved: 2023-01-31

## 2023-01-31 RX ORDER — AMOXICILLIN AND CLAVULANATE POTASSIUM 875; 125 MG/1; MG/1
1 TABLET, FILM COATED ORAL 2 TIMES DAILY
Qty: 20 TABLET | Refills: 0 | Status: SHIPPED | OUTPATIENT
Start: 2023-01-31 | End: 2023-02-10

## 2023-01-31 NOTE — TELEPHONE ENCOUNTER
Pt requesting a different ABX she states this one isn't helping, its the same one Connie Olivas had her on  Pt c/o sinus congestion ,ear pain temp 99.8, she states she is full of infection,she smells it in her stool and nose

## 2023-02-01 ENCOUNTER — TELEPHONE (OUTPATIENT)
Dept: FAMILY MEDICINE CLINIC | Age: 63
End: 2023-02-01

## 2023-02-01 NOTE — TELEPHONE ENCOUNTER
Patient calling upset that her incontinence supplies have not been received and states that [facility] told her that they have been faxing the office requests and that the office has been ignoring their faxes   I tried to explain that what we received from [facility] was a rx for premoistened wash clothes which the provider signed and was faxed back to [facility] on 01.26.2023  I called [facility] and spoke with Ke Matute who stated that they needed an office note to send the supplies and that they had not faxed anything other than the mentioned above  I have faxed the AVS from 01.27.2023

## 2023-02-03 LAB
AVERAGE GLUCOSE: NORMAL
HBA1C MFR BLD: 5.3 %

## 2023-02-23 ENCOUNTER — TELEPHONE (OUTPATIENT)
Dept: FAMILY MEDICINE CLINIC | Age: 63
End: 2023-02-23

## 2023-02-24 ENCOUNTER — TELEPHONE (OUTPATIENT)
Dept: FAMILY MEDICINE CLINIC | Age: 63
End: 2023-02-24

## 2023-02-24 NOTE — TELEPHONE ENCOUNTER
She would need seen for all of this  Would advise UC today if headache is mild  If headache is more severe and/or the worst HA of her life, would advise ER    She has apt to see [facility], would also recommend move that Monday with [facility], she mas multiple openings. But would advise ER/UC regardless today for the headache.

## 2023-02-24 NOTE — TELEPHONE ENCOUNTER
Pt called in and said she has had a headache for the past couple weeks and can not seem to get rid of it     2/24 - 137/89    Pt states she did have the b/p written down but her granddaughter cleaned her house and now she can not find her paper. Was wondering if she needs b/p medications now. Pt states that tylenol and ibuprofen does not work well with her stomach.  Would like something sent in for her headaches to [facility] on [address]    Future Appointments   Date Time Provider Bertin Hoffman   2/28/2023 11:00 AM STR CT IMAGING RM1  [facility] OUT EXP STR Radiolog   3/2/2023  8:40 AM JAMEY Marquez 86   3/7/2023 10:20 AM Keri Essex, APRN - CNP [facility] - [place] [address]   3/27/2023  8:00 AM Roxana Robles MD [facility] - [place] [address]

## 2023-02-24 NOTE — TELEPHONE ENCOUNTER
Pt informed and understanding     Pt will go to ER today. Pt did not want sooner appointment due to needing it on Thursday to get a ride. I offered appointment times on Monday.

## 2023-03-08 DIAGNOSIS — K21.9 GASTROESOPHAGEAL REFLUX DISEASE, UNSPECIFIED WHETHER ESOPHAGITIS PRESENT: ICD-10-CM

## 2023-03-08 RX ORDER — OMEPRAZOLE 20 MG/1
CAPSULE, DELAYED RELEASE ORAL
Qty: 180 CAPSULE | Refills: 3 | Status: SHIPPED | OUTPATIENT
Start: 2023-03-08

## 2023-03-09 ENCOUNTER — TELEPHONE (OUTPATIENT)
Dept: PULMONOLOGY | Age: 63
End: 2023-03-09

## 2023-03-30 ENCOUNTER — OFFICE VISIT (OUTPATIENT)
Dept: PULMONOLOGY | Age: 63
End: 2023-03-30
Payer: COMMERCIAL

## 2023-03-30 VITALS
WEIGHT: 188 LBS | HEIGHT: 65 IN | SYSTOLIC BLOOD PRESSURE: 112 MMHG | OXYGEN SATURATION: 92 % | DIASTOLIC BLOOD PRESSURE: 78 MMHG | BODY MASS INDEX: 31.32 KG/M2 | HEART RATE: 85 BPM

## 2023-03-30 DIAGNOSIS — J43.2 CENTRILOBULAR EMPHYSEMA (HCC): ICD-10-CM

## 2023-03-30 DIAGNOSIS — F17.209 TOBACCO USE DISORDER, CONTINUOUS: ICD-10-CM

## 2023-03-30 DIAGNOSIS — J32.9 RECURRENT SINUS INFECTIONS: ICD-10-CM

## 2023-03-30 DIAGNOSIS — J44.9 STAGE 3 SEVERE COPD BY GOLD CLASSIFICATION (HCC): Primary | ICD-10-CM

## 2023-03-30 PROCEDURE — 94618 PULMONARY STRESS TESTING: CPT | Performed by: NURSE PRACTITIONER

## 2023-03-30 PROCEDURE — 3023F SPIROM DOC REV: CPT | Performed by: NURSE PRACTITIONER

## 2023-03-30 PROCEDURE — 4004F PT TOBACCO SCREEN RCVD TLK: CPT | Performed by: NURSE PRACTITIONER

## 2023-03-30 PROCEDURE — G8417 CALC BMI ABV UP PARAM F/U: HCPCS | Performed by: NURSE PRACTITIONER

## 2023-03-30 PROCEDURE — G8427 DOCREV CUR MEDS BY ELIG CLIN: HCPCS | Performed by: NURSE PRACTITIONER

## 2023-03-30 PROCEDURE — 99214 OFFICE O/P EST MOD 30 MIN: CPT | Performed by: NURSE PRACTITIONER

## 2023-03-30 PROCEDURE — G8484 FLU IMMUNIZE NO ADMIN: HCPCS | Performed by: NURSE PRACTITIONER

## 2023-03-30 PROCEDURE — 3017F COLORECTAL CA SCREEN DOC REV: CPT | Performed by: NURSE PRACTITIONER

## 2023-03-30 PROCEDURE — 99406 BEHAV CHNG SMOKING 3-10 MIN: CPT | Performed by: NURSE PRACTITIONER

## 2023-03-30 RX ORDER — ALBUTEROL SULFATE 2.5 MG/3ML
2.5 SOLUTION RESPIRATORY (INHALATION) EVERY 6 HOURS PRN
Qty: 120 EACH | Refills: 11 | Status: SHIPPED | OUTPATIENT
Start: 2023-03-30 | End: 2024-03-29

## 2023-03-30 ASSESSMENT — ENCOUNTER SYMPTOMS
BACK PAIN: 1
SINUS PAIN: 1
SHORTNESS OF BREATH: 1
ALLERGIC/IMMUNOLOGIC NEGATIVE: 1
SINUS PRESSURE: 1
EYE DISCHARGE: 1
WHEEZING: 0
GASTROINTESTINAL NEGATIVE: 1
COUGH: 0
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
## 2023-04-04 ENCOUNTER — HOSPITAL ENCOUNTER (OUTPATIENT)
Dept: RESPIRATORY THERAPY | Age: 63
Discharge: HOME | End: 2023-04-04
Payer: COMMERCIAL

## 2023-04-04 DIAGNOSIS — J43.2 CENTRILOBULAR EMPHYSEMA (HCC): ICD-10-CM

## 2023-04-04 DIAGNOSIS — J44.9 CHRONIC OBSTRUCTIVE PULMONARY DISEASE, UNSPECIFIED COPD TYPE (HCC): ICD-10-CM

## 2023-04-04 DIAGNOSIS — J44.9 STAGE 3 SEVERE COPD BY GOLD CLASSIFICATION (HCC): ICD-10-CM

## 2023-04-04 PROCEDURE — 94762 N-INVAS EAR/PLS OXIMTRY CONT: CPT

## 2023-04-04 RX ORDER — DIPHENHYDRAMINE HCL 25 MG
CAPSULE ORAL
Qty: 60 CAPSULE | Refills: 3 | Status: SHIPPED | OUTPATIENT
Start: 2023-04-04

## 2023-04-04 RX ORDER — ALBUTEROL SULFATE 90 UG/1
AEROSOL, METERED RESPIRATORY (INHALATION)
Qty: 8.5 G | Refills: 3 | Status: SHIPPED | OUTPATIENT
Start: 2023-04-04

## 2023-04-04 NOTE — PROGRESS NOTES
Instructions were given for an overnight nocturnal pulse oximetry study. The assigned GE number of the pulse oximetry was [id number]. A log sheet was completed. Patient was instructed on documenting any events that occurred throughout the night on the log sheet. The procedure was explained to the learner(s). Patient understanding of the procedure was good. Patient does have a mean of transportation to bring back the study the next day. A patient task was placed in the patients chart for the  to download the nocturnal study and fax the results to the ordering provider for interpretation. The pulse oximetrys memory was cleared. Patient had no questions and was sent home with the pulse oximeter.

## 2023-04-04 NOTE — TELEPHONE ENCOUNTER
Patients nurse called in and had questions about patients oxygen, I informed her she is using 1lpm with activity per 6mwt. She voiced understanding. She also stated patient will need refills on Banophen and Albuterol in [facility] to be sent to [facility] on [address].

## 2023-04-05 ENCOUNTER — TELEPHONE (OUTPATIENT)
Dept: FAMILY MEDICINE CLINIC | Age: 63
End: 2023-04-05

## 2023-04-05 DIAGNOSIS — M48.061 DEGENERATIVE LUMBAR SPINAL STENOSIS: Primary | ICD-10-CM

## 2023-04-05 DIAGNOSIS — M47.816 LUMBAR SPONDYLOSIS: ICD-10-CM

## 2023-04-05 RX ORDER — DICYCLOMINE HCL 20 MG
TABLET ORAL
Qty: 360 TABLET | Refills: 1 | Status: SHIPPED | OUTPATIENT
Start: 2023-04-05

## 2023-04-05 RX ORDER — MIRTAZAPINE 30 MG/1
TABLET, FILM COATED ORAL
Qty: 90 TABLET | Refills: 1 | Status: SHIPPED | OUTPATIENT
Start: 2023-04-05

## 2023-04-05 NOTE — TELEPHONE ENCOUNTER
Ronal Reynolds from continued care has been informed and voiced understanding   Referral placed at the

## 2023-04-05 NOTE — TELEPHONE ENCOUNTER
I received a fax from [facility] requesting refills for Bentyl, Myrbetriq and Remeron. Rx's sent to [facility]. I also d/c'd the Zantac, ditropan and Tramadol as these were old Rx's. Continued care requesting pain management referral for patient as she is having a lot of pain in her back and legs and neuropathy in her feet. I did place referral to [facility] pain management.

## 2023-04-06 DIAGNOSIS — J43.2 CENTRILOBULAR EMPHYSEMA (HCC): ICD-10-CM

## 2023-04-06 DIAGNOSIS — J44.9 STAGE 3 SEVERE COPD BY GOLD CLASSIFICATION (HCC): Primary | ICD-10-CM

## 2023-04-17 DIAGNOSIS — R09.81 SINUS CONGESTION: ICD-10-CM

## 2023-04-17 NOTE — TELEPHONE ENCOUNTER
Patient called in requesting refill of Zyrtec D to be sent to [facility] on [address].      Last ordered 10/11/21  Last visit 3/30/23  Next visit 7/11/23

## 2023-04-18 RX ORDER — CETIRIZINE HYDROCHLORIDE, PSEUDOEPHEDRINE HYDROCHLORIDE 5; 120 MG/1; MG/1
TABLET, FILM COATED, EXTENDED RELEASE ORAL
Qty: 60 TABLET | Refills: 0 | Status: SHIPPED | OUTPATIENT
Start: 2023-04-18

## 2023-04-24 ENCOUNTER — OFFICE VISIT (OUTPATIENT)
Dept: CARDIOLOGY CLINIC | Age: 63
End: 2023-04-24
Payer: COMMERCIAL

## 2023-04-24 VITALS
HEART RATE: 90 BPM | SYSTOLIC BLOOD PRESSURE: 122 MMHG | BODY MASS INDEX: 31.77 KG/M2 | DIASTOLIC BLOOD PRESSURE: 73 MMHG | HEIGHT: 65 IN

## 2023-04-24 DIAGNOSIS — R00.2 HEART PALPITATIONS: ICD-10-CM

## 2023-04-24 DIAGNOSIS — R06.02 SOB (SHORTNESS OF BREATH) ON EXERTION: Primary | ICD-10-CM

## 2023-04-24 DIAGNOSIS — R07.89 CHEST HEAVINESS: ICD-10-CM

## 2023-04-24 PROCEDURE — 99213 OFFICE O/P EST LOW 20 MIN: CPT | Performed by: INTERNAL MEDICINE

## 2023-04-24 PROCEDURE — 3017F COLORECTAL CA SCREEN DOC REV: CPT | Performed by: INTERNAL MEDICINE

## 2023-04-24 PROCEDURE — 93000 ELECTROCARDIOGRAM COMPLETE: CPT | Performed by: INTERNAL MEDICINE

## 2023-04-24 PROCEDURE — G8428 CUR MEDS NOT DOCUMENT: HCPCS | Performed by: INTERNAL MEDICINE

## 2023-04-24 PROCEDURE — G8417 CALC BMI ABV UP PARAM F/U: HCPCS | Performed by: INTERNAL MEDICINE

## 2023-04-24 PROCEDURE — 4004F PT TOBACCO SCREEN RCVD TLK: CPT | Performed by: INTERNAL MEDICINE

## 2023-04-24 NOTE — PROGRESS NOTES
Pt here for 1 yr check up     EKG done today     Pt states med list is correct from appt 4/4/23    Pt continues with swelling, chest tightness, sob, heart palpitations , discussed with primary team, son, palliative team:  add roxanol 5mg SL every6 H PRN for dyspnea

## 2023-04-24 NOTE — PROGRESS NOTES
[address] [facility] [address]  [place] [address]  Dept: [phone]  Dept Fax: [phone]  Loc: [phone]    Visit Date: 4/24/2023    Ms. Jeanie Osuna is a 58 y.o. female  who presented for:  Chief Complaint   Patient presents with    Check-Up    Shortness of Breath    Chest Pain    Palpitations       HPI:   59 yo F c hx of COPD, Smoker, PTSD, Bipolar, HTN, HLD, and HIV is here for a follow up. Denies any chest pain, sob, palpitations, lightheadedness, dizziness, orthopnea, PND or pedal edema. Patient states she had a CVA in 06/2021 and was admitted at [facility]. She had mild numbness of the left side.           Current Outpatient Medications:     cetirizine-psuedoephedrine (ZYRTEC-D) 5-120 MG per extended release tablet, Take 1 tablet daily, Disp: 60 tablet, Rfl: 0    mirabegron (MYRBETRIQ) 25 MG TB24, take 1 tablet by mouth once daily, Disp: 90 tablet, Rfl: 3    dicyclomine (BENTYL) 20 MG tablet, take 1 tablet by mouth three times a day with BREAKFAST, LUNCH, AND DINNER, Disp: 360 tablet, Rfl: 1    mirtazapine (REMERON) 30 MG tablet, take 1/2 TO 1 tablet by mouth NIGHTLY if needed, Disp: 90 tablet, Rfl: 1    albuterol sulfate HFA (PROVENTIL;VENTOLIN;PROAIR) 108 (90 Base) MCG/ACT inhaler, inhale 2 puffs by mouth every 6 hours if needed for wheezing or shortness of breath, Disp: 8.5 g, Rfl: 3    diphenhydrAMINE (BANOPHEN) 25 MG capsule, take 2 capsules by mouth nightly if needed for allergies or sleep, Disp: 60 capsule, Rfl: 3    albuterol (PROVENTIL) (2.5 MG/3ML) 0.083% nebulizer solution, Take 3 mLs by nebulization every 6 hours as needed for Wheezing or Shortness of Breath, Disp: 120 each, Rfl: 11    omeprazole (PRILOSEC) 20 MG delayed release capsule, take 1 capsule by mouth every morning and take 1 capsule at bedtime, Disp: 180 capsule, Rfl: 3    MUCUS RELIEF 600 MG extended release tablet, take 1 tablet by mouth twice a day, Disp: 60

## 2023-05-04 ENCOUNTER — HOSPITAL ENCOUNTER (OUTPATIENT)
Dept: RESPIRATORY THERAPY | Age: 63
Discharge: HOME | End: 2023-05-04
Payer: MEDICAID

## 2023-05-04 PROCEDURE — 94762 N-INVAS EAR/PLS OXIMTRY CONT: CPT

## 2023-05-04 NOTE — PROGRESS NOTES
Instructions were given for an overnight nocturnal pulse oximetry study. The assigned GE number of the pulse oximetry was [id number]. A log sheet was completed. Patient was instructed on documenting any events that occurred throughout the night on the log sheet. The procedure was explained to the learner(s). Patient understanding of the procedure was excellent. Patient does have a mean of transportation to bring back the study the next day. A patient task was placed in the patients chart for the  to download the nocturnal study and fax the results to the ordering provider for interpretation. The pulse oximetrys memory was cleared. Patient had no questions and was sent home with the pulse oximeter.

## 2023-05-08 DIAGNOSIS — J44.9 STAGE 3 SEVERE COPD BY GOLD CLASSIFICATION (HCC): Primary | ICD-10-CM

## 2023-05-08 DIAGNOSIS — J43.2 CENTRILOBULAR EMPHYSEMA (HCC): ICD-10-CM

## 2023-05-08 DIAGNOSIS — J44.9 STAGE 3 SEVERE COPD BY GOLD CLASSIFICATION (HCC): ICD-10-CM

## 2023-05-15 ENCOUNTER — TELEPHONE (OUTPATIENT)
Dept: PULMONOLOGY | Age: 63
End: 2023-05-15

## 2023-05-15 NOTE — TELEPHONE ENCOUNTER
Patient LVM on clinical line on 5/10/23 wanting to know about her [employer]. -  I called patient back and told her that I do not see anything in the chart about [employer]ogen. She stated that Dr. Yola Almaguer? Was supposed to fax a script over. I told her that they will have to refax what they sent us because we do not have it. -  The call disconnected. -  I tried calling back and the number rang busy.

## 2023-05-16 NOTE — TELEPHONE ENCOUNTER
[employer] called req that we sign and fax back inogen orders. Patient was never walked on POC unable to send orders. [employer] does not understand that out providers DO NOT order Inogen on patient unless they qualify for POC.

## 2023-05-18 ENCOUNTER — TELEPHONE (OUTPATIENT)
Dept: PULMONOLOGY | Age: 63
End: 2023-05-18

## 2023-05-18 NOTE — TELEPHONE ENCOUNTER
Received vm from LLOYD Goldman w/[facility] stating an order for a Smart Vest was sent to [employer] by Dm Mcguire. [facility] is not in her network of covered providers. There are alternatives but Tran did not leave the alternatives. Attempted to call LLOYD Goldman/[facility] back and had to leave message for a return call.

## 2023-05-19 ENCOUNTER — TELEPHONE (OUTPATIENT)
Dept: PULMONOLOGY | Age: 63
End: 2023-05-19

## 2023-05-22 ENCOUNTER — TELEPHONE (OUTPATIENT)
Dept: PULMONOLOGY | Age: 63
End: 2023-05-22

## 2023-05-22 DIAGNOSIS — J44.9 STAGE 3 SEVERE COPD BY GOLD CLASSIFICATION (HCC): Primary | ICD-10-CM

## 2023-05-22 DIAGNOSIS — J44.9 STAGE 3 SEVERE COPD BY GOLD CLASSIFICATION (HCC): ICD-10-CM

## 2023-05-22 RX ORDER — NEBULIZER ACCESSORIES
1 EACH MISCELLANEOUS EVERY 6 HOURS PRN
Qty: 1 EACH | Refills: 0 | Status: SHIPPED | OUTPATIENT
Start: 2023-05-22 | End: 2023-05-22 | Stop reason: SDUPTHER

## 2023-05-22 RX ORDER — NEBULIZER ACCESSORIES
1 EACH MISCELLANEOUS EVERY 6 HOURS PRN
Qty: 1 EACH | Refills: 0 | Status: SHIPPED | OUTPATIENT
Start: 2023-05-22

## 2023-06-20 ENCOUNTER — TELEPHONE (OUTPATIENT)
Dept: PULMONOLOGY | Age: 63
End: 2023-06-20

## 2023-06-26 DIAGNOSIS — R09.81 SINUS CONGESTION: ICD-10-CM

## 2023-06-26 DIAGNOSIS — J43.2 CENTRILOBULAR EMPHYSEMA (HCC): ICD-10-CM

## 2023-06-26 DIAGNOSIS — J44.9 STAGE 3 SEVERE COPD BY GOLD CLASSIFICATION (HCC): Primary | ICD-10-CM

## 2023-06-26 RX ORDER — CETIRIZINE HYDROCHLORIDE, PSEUDOEPHEDRINE HYDROCHLORIDE 5; 120 MG/1; MG/1
TABLET, FILM COATED, EXTENDED RELEASE ORAL
Qty: 60 TABLET | Refills: 0 | Status: SHIPPED | OUTPATIENT
Start: 2023-06-26

## 2023-06-30 ENCOUNTER — HOSPITAL ENCOUNTER (OUTPATIENT)
Dept: CT IMAGING | Age: 63
Discharge: HOME | End: 2023-06-30
Payer: MEDICAID

## 2023-06-30 DIAGNOSIS — J43.2 CENTRILOBULAR EMPHYSEMA (HCC): ICD-10-CM

## 2023-06-30 DIAGNOSIS — J44.9 STAGE 3 SEVERE COPD BY GOLD CLASSIFICATION (HCC): ICD-10-CM

## 2023-06-30 DIAGNOSIS — F17.209 TOBACCO USE DISORDER, CONTINUOUS: ICD-10-CM

## 2023-06-30 PROCEDURE — 71271 CT THORAX LUNG CANCER SCR C-: CPT

## 2023-07-06 NOTE — TELEPHONE ENCOUNTER
I could not tell if this had been faxed so I faxed it today. To the DME from earlier. Possible its [employer] but says José Luis? ??

## 2023-07-11 ENCOUNTER — OFFICE VISIT (OUTPATIENT)
Dept: PULMONOLOGY | Age: 63
End: 2023-07-11
Payer: MEDICAID

## 2023-07-11 VITALS
DIASTOLIC BLOOD PRESSURE: 74 MMHG | SYSTOLIC BLOOD PRESSURE: 116 MMHG | OXYGEN SATURATION: 93 % | HEIGHT: 65 IN | HEART RATE: 86 BPM | BODY MASS INDEX: 29.79 KG/M2 | WEIGHT: 178.8 LBS | TEMPERATURE: 97.7 F

## 2023-07-11 DIAGNOSIS — J43.2 CENTRILOBULAR EMPHYSEMA (HCC): ICD-10-CM

## 2023-07-11 DIAGNOSIS — F17.209 TOBACCO USE DISORDER, CONTINUOUS: ICD-10-CM

## 2023-07-11 DIAGNOSIS — J44.9 STAGE 3 SEVERE COPD BY GOLD CLASSIFICATION (HCC): Primary | ICD-10-CM

## 2023-07-11 DIAGNOSIS — J44.1 COPD EXACERBATION (HCC): ICD-10-CM

## 2023-07-11 DIAGNOSIS — J01.00 ACUTE NON-RECURRENT MAXILLARY SINUSITIS: ICD-10-CM

## 2023-07-11 PROCEDURE — 99214 OFFICE O/P EST MOD 30 MIN: CPT | Performed by: NURSE PRACTITIONER

## 2023-07-11 RX ORDER — AMOXICILLIN AND CLAVULANATE POTASSIUM 875; 125 MG/1; MG/1
1 TABLET, FILM COATED ORAL 2 TIMES DAILY
Qty: 20 TABLET | Refills: 0 | Status: SHIPPED | OUTPATIENT
Start: 2023-07-11 | End: 2023-07-21

## 2023-07-11 RX ORDER — MONTELUKAST SODIUM 10 MG/1
10 TABLET ORAL NIGHTLY
Qty: 90 TABLET | Refills: 3 | Status: SHIPPED | OUTPATIENT
Start: 2023-07-11

## 2023-07-11 RX ORDER — PREDNISONE 10 MG/1
TABLET ORAL
Qty: 30 TABLET | Refills: 0 | Status: SHIPPED | OUTPATIENT
Start: 2023-07-11 | End: 2023-07-21

## 2023-07-11 ASSESSMENT — ENCOUNTER SYMPTOMS
GASTROINTESTINAL NEGATIVE: 1
SINUS PAIN: 1
SHORTNESS OF BREATH: 1
COUGH: 0
WHEEZING: 0
SINUS PRESSURE: 1
ALLERGIC/IMMUNOLOGIC NEGATIVE: 1
EYES NEGATIVE: 1
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
## 2023-07-17 ENCOUNTER — APPOINTMENT (OUTPATIENT)
Dept: CT IMAGING | Age: 63
End: 2023-07-17
Payer: MEDICAID

## 2023-07-17 ENCOUNTER — HOSPITAL ENCOUNTER (EMERGENCY)
Age: 63
Discharge: HOME | End: 2023-07-17
Attending: EMERGENCY MEDICINE
Payer: MEDICAID

## 2023-07-17 VITALS
RESPIRATION RATE: 20 BRPM | HEART RATE: 81 BPM | OXYGEN SATURATION: 100 % | DIASTOLIC BLOOD PRESSURE: 86 MMHG | SYSTOLIC BLOOD PRESSURE: 143 MMHG | TEMPERATURE: 98.4 F

## 2023-07-17 DIAGNOSIS — K59.00 CONSTIPATION, UNSPECIFIED CONSTIPATION TYPE: Primary | ICD-10-CM

## 2023-07-17 LAB
ABO: NORMAL
ALBUMIN SERPL BCG-MCNC: 4.3 G/DL (ref 3.5–5.1)
ALP SERPL-CCNC: 159 U/L (ref 38–126)
ALT SERPL W/O P-5'-P-CCNC: 19 U/L (ref 11–66)
ANION GAP SERPL CALC-SCNC: 15 MEQ/L (ref 8–16)
ANTIBODY SCREEN: NORMAL
AST SERPL-CCNC: 20 U/L (ref 5–40)
BASOPHILS ABSOLUTE: 0.1 THOU/MM3 (ref 0–0.1)
BASOPHILS NFR BLD AUTO: 0.5 %
BILIRUB CONJ SERPL-MCNC: < 0.2 MG/DL (ref 0–0.3)
BILIRUB SERPL-MCNC: < 0.2 MG/DL (ref 0.3–1.2)
BILIRUB UR QL STRIP.AUTO: NEGATIVE
BUN SERPL-MCNC: 17 MG/DL (ref 7–22)
CALCIUM SERPL-MCNC: 9.4 MG/DL (ref 8.5–10.5)
CHARACTER UR: CLEAR
CHLORIDE SERPL-SCNC: 106 MEQ/L (ref 98–111)
CO2 SERPL-SCNC: 21 MEQ/L (ref 23–33)
COLOR: YELLOW
CREAT SERPL-MCNC: 0.8 MG/DL (ref 0.4–1.2)
CRP SERPL-MCNC: < 0.3 MG/DL (ref 0–1)
DEPRECATED RDW RBC AUTO: 48.4 FL (ref 35–45)
EOSINOPHIL NFR BLD AUTO: 0.1 %
EOSINOPHILS ABSOLUTE: 0 THOU/MM3 (ref 0–0.4)
ERYTHROCYTE [DISTWIDTH] IN BLOOD BY AUTOMATED COUNT: 14 % (ref 11.5–14.5)
GFR SERPL CREATININE-BSD FRML MDRD: > 60 ML/MIN/1.73M2
GLUCOSE SERPL-MCNC: 110 MG/DL (ref 70–108)
GLUCOSE UR QL STRIP.AUTO: NEGATIVE MG/DL
HCT VFR BLD AUTO: 44.4 % (ref 37–47)
HGB BLD-MCNC: 14.3 GM/DL (ref 12–16)
HGB UR QL STRIP.AUTO: NEGATIVE
IMM GRANULOCYTES # BLD AUTO: 0.06 THOU/MM3 (ref 0–0.07)
IMM GRANULOCYTES NFR BLD AUTO: 0.5 %
KETONES UR QL STRIP.AUTO: NEGATIVE
LACTATE SERPL-SCNC: 2.3 MMOL/L (ref 0.5–2)
LACTATE SERPL-SCNC: 3.2 MMOL/L (ref 0.5–2)
LIPASE SERPL-CCNC: 39.5 U/L (ref 5.6–51.3)
LYMPHOCYTES ABSOLUTE: 1.2 THOU/MM3 (ref 1–4.8)
LYMPHOCYTES NFR BLD AUTO: 10 %
MCH RBC QN AUTO: 30.6 PG (ref 26–33)
MCHC RBC AUTO-ENTMCNC: 32.2 GM/DL (ref 32.2–35.5)
MCV RBC AUTO: 94.9 FL (ref 81–99)
MONOCYTES ABSOLUTE: 0.2 THOU/MM3 (ref 0.4–1.3)
MONOCYTES NFR BLD AUTO: 1.7 %
NEUTROPHILS NFR BLD AUTO: 87.2 %
NITRITE UR QL STRIP: NEGATIVE
NRBC BLD AUTO-RTO: 0 /100 WBC
OSMOLALITY SERPL CALC.SUM OF ELEC: 285.3 MOSMOL/KG (ref 275–300)
PH UR STRIP.AUTO: 5.5 [PH] (ref 5–9)
PLATELET # BLD AUTO: 342 THOU/MM3 (ref 130–400)
PMV BLD AUTO: 10.1 FL (ref 9.4–12.4)
POTASSIUM SERPL-SCNC: 4.4 MEQ/L (ref 3.5–5.2)
PROT SERPL-MCNC: 7.5 G/DL (ref 6.1–8)
PROT UR STRIP.AUTO-MCNC: NEGATIVE MG/DL
RBC # BLD AUTO: 4.68 MILL/MM3 (ref 4.2–5.4)
RH FACTOR: NORMAL
SEGMENTED NEUTROPHILS ABSOLUTE COUNT: 10.1 THOU/MM3 (ref 1.8–7.7)
SODIUM SERPL-SCNC: 142 MEQ/L (ref 135–145)
SP GR UR REFRACT.AUTO: 1.01 (ref 1–1.03)
UROBILINOGEN, URINE: 0.2 EU/DL (ref 0–1)
WBC # BLD AUTO: 11.6 THOU/MM3 (ref 4.8–10.8)
WBC #/AREA URNS HPF: NEGATIVE /[HPF]

## 2023-07-17 PROCEDURE — 86850 RBC ANTIBODY SCREEN: CPT

## 2023-07-17 PROCEDURE — 86901 BLOOD TYPING SEROLOGIC RH(D): CPT

## 2023-07-17 PROCEDURE — 2580000003 HC RX 258: Performed by: STUDENT IN AN ORGANIZED HEALTH CARE EDUCATION/TRAINING PROGRAM

## 2023-07-17 PROCEDURE — 74177 CT ABD & PELVIS W/CONTRAST: CPT

## 2023-07-17 PROCEDURE — 81003 URINALYSIS AUTO W/O SCOPE: CPT

## 2023-07-17 PROCEDURE — 99285 EMERGENCY DEPT VISIT HI MDM: CPT

## 2023-07-17 PROCEDURE — 83690 ASSAY OF LIPASE: CPT

## 2023-07-17 PROCEDURE — 6360000004 HC RX CONTRAST MEDICATION: Performed by: STUDENT IN AN ORGANIZED HEALTH CARE EDUCATION/TRAINING PROGRAM

## 2023-07-17 PROCEDURE — 6360000002 HC RX W HCPCS: Performed by: STUDENT IN AN ORGANIZED HEALTH CARE EDUCATION/TRAINING PROGRAM

## 2023-07-17 PROCEDURE — 96374 THER/PROPH/DIAG INJ IV PUSH: CPT

## 2023-07-17 PROCEDURE — 83605 ASSAY OF LACTIC ACID: CPT

## 2023-07-17 PROCEDURE — 82248 BILIRUBIN DIRECT: CPT

## 2023-07-17 PROCEDURE — 86140 C-REACTIVE PROTEIN: CPT

## 2023-07-17 PROCEDURE — 80053 COMPREHEN METABOLIC PANEL: CPT

## 2023-07-17 PROCEDURE — 85025 COMPLETE CBC W/AUTO DIFF WBC: CPT

## 2023-07-17 PROCEDURE — 36415 COLL VENOUS BLD VENIPUNCTURE: CPT

## 2023-07-17 PROCEDURE — 86900 BLOOD TYPING SEROLOGIC ABO: CPT

## 2023-07-17 PROCEDURE — 93005 ELECTROCARDIOGRAM TRACING: CPT | Performed by: STUDENT IN AN ORGANIZED HEALTH CARE EDUCATION/TRAINING PROGRAM

## 2023-07-17 RX ORDER — POLYETHYLENE GLYCOL 3350 17 G/17G
17 POWDER, FOR SOLUTION ORAL DAILY
Qty: 1530 G | Refills: 1 | Status: SHIPPED | OUTPATIENT
Start: 2023-07-17 | End: 2024-01-13

## 2023-07-17 RX ORDER — 0.9 % SODIUM CHLORIDE 0.9 %
1000 INTRAVENOUS SOLUTION INTRAVENOUS ONCE
Status: COMPLETED | OUTPATIENT
Start: 2023-07-17 | End: 2023-07-17

## 2023-07-17 RX ORDER — FENTANYL CITRATE 50 UG/ML
50 INJECTION, SOLUTION INTRAMUSCULAR; INTRAVENOUS ONCE
Status: COMPLETED | OUTPATIENT
Start: 2023-07-17 | End: 2023-07-17

## 2023-07-17 RX ADMIN — FENTANYL CITRATE 50 MCG: 50 INJECTION, SOLUTION INTRAMUSCULAR; INTRAVENOUS at 15:17

## 2023-07-17 RX ADMIN — SODIUM CHLORIDE 1000 ML: 9 INJECTION, SOLUTION INTRAVENOUS at 15:19

## 2023-07-17 RX ADMIN — IOPAMIDOL 80 ML: 755 INJECTION, SOLUTION INTRAVENOUS at 15:34

## 2023-07-17 ASSESSMENT — PAIN SCALES - GENERAL
PAINLEVEL_OUTOF10: 10
PAINLEVEL_OUTOF10: 3

## 2023-07-17 ASSESSMENT — PAIN - FUNCTIONAL ASSESSMENT: PAIN_FUNCTIONAL_ASSESSMENT: 0-10

## 2023-07-17 NOTE — ED PROVIDER NOTES
Laterality Date    CHOLECYSTECTOMY, LAPAROSCOPIC  5/5/16    [facility]    COLONOSCOPY  2014    ENDOSCOPY, COLON, DIAGNOSTIC      FOOT SURGERY Bilateral unsure    plates and srews    HERNIA REPAIR  unsure    HYSTERECTOMY (CERVIX STATUS UNKNOWN)  98    JOINT REPLACEMENT  unsure    Right knee    LUMBAR FUSION N/A 3/19/2021    L3-5 DECOMPRESSION, L3-4 POSTERIOR FUSION performed by Rolando Marcelo MD at [facility]  2014       CURRENT MEDICATIONS       Previous Medications    ALBUTEROL (PROVENTIL) (2.5 MG/3ML) 0.083% NEBULIZER SOLUTION    Take 3 mLs by nebulization every 6 hours as needed for Wheezing or Shortness of Breath    ALBUTEROL SULFATE HFA (PROVENTIL;VENTOLIN;PROAIR) 108 (90 BASE) MCG/ACT INHALER    inhale 2 puffs by mouth every 6 hours if needed for wheezing or shortness of breath    AMOXICILLIN-CLAVULANATE (AUGMENTIN) 875-125 MG PER TABLET    Take 1 tablet by mouth 2 times daily for 10 days    BIKTARVY -25 MG TABS PER TABLET    take 1 tablet by mouth once daily    BUDESON-GLYCOPYRROL-FORMOTEROL 160-9-4.8 MCG/ACT AERO    Inhale 2 puffs into the lungs in the morning and at bedtime    BUPROPION (WELLBUTRIN XL) 150 MG EXTENDED RELEASE TABLET    Take 1 tablet by mouth every morning    CETIRIZINE-PSUEDOEPHEDRINE (ZYRTEC-D) 5-120 MG PER EXTENDED RELEASE TABLET    take 1 tablet by mouth daily    DIAZEPAM (VALIUM) 5 MG TABLET    take 1 tablet by mouth three times a day if needed    DICYCLOMINE (BENTYL) 20 MG TABLET    take 1 tablet by mouth three times a day with BREAKFAST, LUNCH, AND DINNER    DIPHENHYDRAMINE (BANOPHEN) 25 MG CAPSULE    take 2 capsules by mouth nightly if needed for allergies or sleep    DOXEPIN (SINEQUAN) 100 MG CAPSULE    take 1 capsule by mouth at bedtime if needed for sleep    LATUDA 60 MG TABS TABLET    take 1 tablet by mouth every evening WITH DINNER - AT LEAST 350 CALORIES    MIRABEGRON (MYRBETRIQ) 25 MG TB24    take 1 tablet by mouth once daily

## 2023-07-17 NOTE — ED NOTES
Patient resting in bed. Respirations easy and unlabored. No distress noted. Call light within reach.   Pt medicated per Reena العراقي RN  07/17/23 2313

## 2023-07-17 NOTE — ED NOTES
Patient resting in bed. Respirations easy and unlabored. No distress noted. Call light within reach.        Giancarlo Hanson RN  07/17/23 1990

## 2023-07-17 NOTE — ED NOTES
Pt arrives to ED from home with c/o lower left quadrant abdominal pain. Pt reports the pain has been ongoing for months.  Pt states she had procedure to remove polyps many years ago, denies any follow up       Carlos Mills RN  07/17/23 9792

## 2023-07-17 NOTE — DISCHARGE INSTRUCTIONS
You were seen today for abdominal pain. CT scan did not reveal a bowel obstruction. There was however a large amount of stool consistent with constipation. Take 4 scoops of MiraLAX a day. Return to the emergency department experience fevers, vomiting, worsening abdominal pain or if you have any other concerns. Follow-up with your primary care physician.

## 2023-07-18 LAB
EKG ATRIAL RATE: 85 BPM
EKG P AXIS: 73 DEGREES
EKG P-R INTERVAL: 150 MS
EKG Q-T INTERVAL: 370 MS
EKG QRS DURATION: 92 MS
EKG QTC CALCULATION (BAZETT): 440 MS
EKG R AXIS: 47 DEGREES
EKG T AXIS: 64 DEGREES
EKG VENTRICULAR RATE: 85 BPM

## 2023-07-18 PROCEDURE — 93010 ELECTROCARDIOGRAM REPORT: CPT | Performed by: INTERNAL MEDICINE

## 2023-08-01 ENCOUNTER — OFFICE VISIT (OUTPATIENT)
Dept: ENT CLINIC | Age: 63
End: 2023-08-01
Payer: MEDICAID

## 2023-08-01 VITALS
WEIGHT: 178 LBS | DIASTOLIC BLOOD PRESSURE: 84 MMHG | SYSTOLIC BLOOD PRESSURE: 136 MMHG | HEIGHT: 65 IN | RESPIRATION RATE: 20 BRPM | BODY MASS INDEX: 29.66 KG/M2 | HEART RATE: 82 BPM | TEMPERATURE: 98 F | OXYGEN SATURATION: 99 %

## 2023-08-01 DIAGNOSIS — R51.9 FRONTAL HEADACHE: ICD-10-CM

## 2023-08-01 DIAGNOSIS — R05.9 COUGH, UNSPECIFIED TYPE: ICD-10-CM

## 2023-08-01 DIAGNOSIS — R09.81 NASAL CONGESTION: Primary | ICD-10-CM

## 2023-08-01 DIAGNOSIS — R04.0 EPISTAXIS: ICD-10-CM

## 2023-08-01 DIAGNOSIS — J34.89 CONCHA BULLOSA: ICD-10-CM

## 2023-08-01 DIAGNOSIS — Z87.891 SMOKING HISTORY: ICD-10-CM

## 2023-08-01 DIAGNOSIS — J34.89 RHINORRHEA: ICD-10-CM

## 2023-08-01 PROCEDURE — 30901 CONTROL OF NOSEBLEED: CPT | Performed by: PHYSICIAN ASSISTANT

## 2023-08-01 PROCEDURE — 99204 OFFICE O/P NEW MOD 45 MIN: CPT | Performed by: PHYSICIAN ASSISTANT

## 2023-08-01 RX ORDER — FLUTICASONE PROPIONATE 50 MCG
1 SPRAY, SUSPENSION (ML) NASAL DAILY
Qty: 16 G | Refills: 1 | Status: SHIPPED | OUTPATIENT
Start: 2023-08-01

## 2023-08-01 RX ORDER — LORATADINE 10 MG/1
10 TABLET ORAL DAILY
Qty: 30 TABLET | Refills: 1 | Status: SHIPPED | OUTPATIENT
Start: 2023-08-01

## 2023-08-01 NOTE — PROGRESS NOTES
[facility]  [address] [address]  Dept: [phone]  Dept Fax: [phone]  Loc: [phone]    Roselyn Bailey is a 61 y.o. female who was referred by JAMEY Barron -* for:  Chief Complaint   Patient presents with    New Patient     New patient,  Recurrent sinus infections, Referred by Melissa Babb CNP. Patient stated when she has severe coughing her nose bleeds, just started last night. Priti Schroeder HPI:     Roselyn Bailey presents today for several complaints including coughing, nosebleeds, frontal headaches, and nasal congestion. She states that she has also had chronic sinus infections for the last 10 to 20 years. Her symptoms include nasal congestion, purulent rhinorrhea, cough, frontal headaches, facial pain, and ear pressure bilaterally. There has not been a history of chronic otitis media or pharyngeal tonsillitis. She endorses that she has been on multiple different antibiotic therapies that she states do not improve her symptoms. She endorses having a history of allergy testing which was positive for ragweed and cat dander. Patient states that she has been on medications including Singulair and Zyrtec D but reports that this is not strong enough to help her symptoms. 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 She denies a history of surgery to her head and neck. Patient reports coughing up white/yellow phlegm on a consistent basis and believes that this is tonsil stones which she thinks is an infection that she needs treated. Patient endorses that her nasal congestion gets much worse at night and that she gets daily headaches. She is a current every day half pack per day smoker and states that she is in stage III COPD.   Regarding her nosebleeds she states that she does not have a recurrent history of

## 2023-08-08 ENCOUNTER — TELEPHONE (OUTPATIENT)
Dept: PULMONOLOGY | Age: 63
End: 2023-08-08

## 2023-08-08 NOTE — TELEPHONE ENCOUNTER
Patient is calling and is frustrated. She said that [facility] [address] has been telling her that they have been faxing over multiple requests for the prior auth for her medication, breztri. She said they are supposed to deliver the med tomorrow, but wont be able to without the prior auth. Patient is requesting a call back as well.     Please advise

## 2023-08-08 NOTE — TELEPHONE ENCOUNTER
Saw in chart that PA was sent to cover my meds 08-07. I explained how a PA works and calmed her down a little bit.

## 2023-08-08 NOTE — TELEPHONE ENCOUNTER
Submitted prior authorization for BREZTRI via CoverMyMeds today 08/08/23. -  I will update this encounter once a determination has been received.

## 2023-08-08 NOTE — TELEPHONE ENCOUNTER
Lisa Flores is calling to see if she can get samples of Lashonda Cousins, her PA was sent yesterday 08-07, and she is out of her meds. Please call her to let her know the response.

## 2023-08-09 NOTE — TELEPHONE ENCOUNTER
There are two additional encounters that have been started after this about this situation. I will sign/close this one. Please see other encounters for additional info.

## 2023-08-23 NOTE — TELEPHONE ENCOUNTER
No determination received from plan. Attempted to call plan to check on status of PA. Could never get through to a real live person therefor I terminated the call. -  I re-submitted PA via CoverMyMeds today  -  I will update this encounter once a determination has been received.

## 2023-09-06 ENCOUNTER — HOSPITAL ENCOUNTER (OUTPATIENT)
Dept: CT IMAGING | Age: 63
Discharge: HOME | End: 2023-09-06
Attending: REGISTERED NURSE
Payer: MEDICAID

## 2023-09-06 DIAGNOSIS — J34.89 CONCHA BULLOSA: ICD-10-CM

## 2023-09-06 DIAGNOSIS — R09.81 NASAL CONGESTION: ICD-10-CM

## 2023-09-06 DIAGNOSIS — R51.9 FRONTAL HEADACHE: ICD-10-CM

## 2023-09-06 DIAGNOSIS — R05.9 COUGH, UNSPECIFIED TYPE: ICD-10-CM

## 2023-09-06 PROCEDURE — 70486 CT MAXILLOFACIAL W/O DYE: CPT

## 2023-09-07 DIAGNOSIS — J44.9 CHRONIC OBSTRUCTIVE PULMONARY DISEASE, UNSPECIFIED COPD TYPE (HCC): ICD-10-CM

## 2023-09-08 RX ORDER — ALBUTEROL SULFATE 90 UG/1
AEROSOL, METERED RESPIRATORY (INHALATION)
Qty: 18 G | Refills: 3 | Status: SHIPPED | OUTPATIENT
Start: 2023-09-08

## 2023-09-18 RX ORDER — ASPIRIN 325 MG
600 TABLET ORAL 2 TIMES DAILY
Qty: 60 TABLET | Refills: 6 | Status: SHIPPED | OUTPATIENT
Start: 2023-09-18

## 2023-09-25 ENCOUNTER — TELEPHONE (OUTPATIENT)
Dept: PULMONOLOGY | Age: 63
End: 2023-09-25

## 2023-09-25 DIAGNOSIS — J34.89 RHINORRHEA: ICD-10-CM

## 2023-09-25 DIAGNOSIS — R05.9 COUGH, UNSPECIFIED TYPE: ICD-10-CM

## 2023-09-25 DIAGNOSIS — R09.81 NASAL CONGESTION: ICD-10-CM

## 2023-09-25 RX ORDER — MIRTAZAPINE 30 MG/1
TABLET, FILM COATED ORAL
Qty: 90 TABLET | Refills: 1 | Status: SHIPPED | OUTPATIENT
Start: 2023-09-25

## 2023-09-25 RX ORDER — LORATADINE 10 MG/1
10 TABLET ORAL DAILY
Qty: 30 TABLET | Refills: 1 | Status: SHIPPED | OUTPATIENT
Start: 2023-09-25

## 2023-09-25 NOTE — TELEPHONE ENCOUNTER
Pateint was calling stating she has medicaid and has a $3.00 copay for her Budeson-Glycopyrrol-Formoterol 160-9-4.8 MCG/ACT AERO. Patient is asking if she able to get samples. We did give her samples last time. Please advise.

## 2023-09-25 NOTE — TELEPHONE ENCOUNTER
Recent Visits  Date Type Provider Dept   01/27/23 Office Visit JAMEY Myles CNP [facility]   05/20/22 Office Visit JAMEY Myles CNP [facility]   Showing recent visits within past 540 days with a meds authorizing provider and meeting all other requirements  Future Appointments  No visits were found meeting these conditions.   Showing future appointments within next 150 days with a meds authorizing provider and meeting all other requirements

## 2023-09-26 ENCOUNTER — TELEPHONE (OUTPATIENT)
Dept: FAMILY MEDICINE CLINIC | Age: 63
End: 2023-09-26

## 2023-09-26 RX ORDER — LORATADINE 10 MG/1
10 TABLET ORAL DAILY
Qty: 90 TABLET | Refills: 0 | Status: SHIPPED | OUTPATIENT
Start: 2023-09-26

## 2023-09-26 RX ORDER — GUAIFENESIN 600 MG/1
600 TABLET, EXTENDED RELEASE ORAL 2 TIMES DAILY
Qty: 30 TABLET | Refills: 0 | Status: SHIPPED | OUTPATIENT
Start: 2023-09-26 | End: 2023-10-11

## 2023-09-26 RX ORDER — IBUPROFEN 800 MG/1
800 TABLET ORAL 4 TIMES DAILY PRN
Qty: 120 TABLET | Refills: 0 | Status: SHIPPED | OUTPATIENT
Start: 2023-09-26

## 2023-09-26 NOTE — TELEPHONE ENCOUNTER
Received faxes from [facility]. Patient requesting Claritin 10 mg and Mucus relief  mg BID. She is also having a lot of pain in her back and legs, requesting Motrin 800 mg. Describes the pain as a constant pulling sensation. Rx's sent to [facility].

## 2023-11-09 ENCOUNTER — OFFICE VISIT (OUTPATIENT)
Dept: ENT CLINIC | Age: 63
End: 2023-11-09
Payer: MEDICAID

## 2023-11-09 VITALS
HEIGHT: 65 IN | RESPIRATION RATE: 20 BRPM | BODY MASS INDEX: 30.02 KG/M2 | DIASTOLIC BLOOD PRESSURE: 68 MMHG | SYSTOLIC BLOOD PRESSURE: 122 MMHG | OXYGEN SATURATION: 89 % | TEMPERATURE: 97.2 F | HEART RATE: 62 BPM | WEIGHT: 180.2 LBS

## 2023-11-09 DIAGNOSIS — G47.33 OBSTRUCTIVE SLEEP APNEA: ICD-10-CM

## 2023-11-09 DIAGNOSIS — J34.2 DEVIATED NASAL SEPTUM: Primary | ICD-10-CM

## 2023-11-09 DIAGNOSIS — Z01.818 PREOP TESTING: Primary | ICD-10-CM

## 2023-11-09 DIAGNOSIS — J34.89 CONCHA BULLOSA: ICD-10-CM

## 2023-11-09 DIAGNOSIS — R09.81 NASAL CONGESTION: ICD-10-CM

## 2023-11-09 PROCEDURE — 99214 OFFICE O/P EST MOD 30 MIN: CPT | Performed by: OTOLARYNGOLOGY

## 2023-11-09 ASSESSMENT — ENCOUNTER SYMPTOMS
SINUS PRESSURE: 0
WHEEZING: 0
SHORTNESS OF BREATH: 0
DIARRHEA: 0
RHINORRHEA: 0
VOMITING: 0
SORE THROAT: 0
APNEA: 0
STRIDOR: 0
NAUSEA: 0
CHEST TIGHTNESS: 0
COUGH: 0
VOICE CHANGE: 0
COLOR CHANGE: 0
ABDOMINAL PAIN: 0
TROUBLE SWALLOWING: 0
CHOKING: 0
FACIAL SWELLING: 0

## 2023-11-09 NOTE — PROGRESS NOTES
MCG/ACT nasal spray 1 spray by Each Nostril route daily 16 g 1    polyethylene glycol (GLYCOLAX) 17 GM/SCOOP powder Take 17 g by mouth daily 1530 g 1    Budeson-Glycopyrrol-Formoterol 160-9-4.8 MCG/ACT AERO Inhale 2 puffs into the lungs in the morning and at bedtime 1 each 11    montelukast (SINGULAIR) 10 MG tablet Take 1 tablet by mouth nightly 90 tablet 3    cetirizine-psuedoephedrine (ZYRTEC-D) 5-120 MG per extended release tablet take 1 tablet by mouth daily 60 tablet 0    Respiratory Therapy Supplies (NEBULIZER AIR TUBE/PLUGS) MISC 1 each by Does not apply route every 6 hours as needed (Wheezing/shortness of breath) Please provide nebulizer kit and supplies. Mask needed for breathing treatments 1 each 0    mirabegron (MYRBETRIQ) 25 MG TB24 take 1 tablet by mouth once daily 90 tablet 3    dicyclomine (BENTYL) 20 MG tablet take 1 tablet by mouth three times a day with BREAKFAST, LUNCH, AND DINNER 360 tablet 1    diphenhydrAMINE (BANOPHEN) 25 MG capsule take 2 capsules by mouth nightly if needed for allergies or sleep 60 capsule 3    albuterol (PROVENTIL) (2.5 MG/3ML) 0.083% nebulizer solution Take 3 mLs by nebulization every 6 hours as needed for Wheezing or Shortness of Breath 120 each 11    omeprazole (PRILOSEC) 20 MG delayed release capsule take 1 capsule by mouth every morning and take 1 capsule at bedtime 180 capsule 3    diazePAM (VALIUM) 5 MG tablet take 1 tablet by mouth three times a day if needed      LATUDA 60 MG TABS tablet take 1 tablet by mouth every evening WITH DINNER - AT LEAST 350 CALORIES      doxepin (SINEQUAN) 100 MG capsule take 1 capsule by mouth at bedtime if needed for sleep      BIKTARVY -25 MG TABS per tablet take 1 tablet by mouth once daily      buPROPion (WELLBUTRIN XL) 150 MG extended release tablet Take 1 tablet by mouth every morning      OXYGEN Inhale 2 L into the lungs nightly       No current facility-administered medications for this visit.      Past Medical History:

## 2023-11-10 ENCOUNTER — TELEPHONE (OUTPATIENT)
Dept: CARDIOLOGY CLINIC | Age: 63
End: 2023-11-10

## 2023-11-10 ENCOUNTER — TELEPHONE (OUTPATIENT)
Dept: PULMONOLOGY | Age: 63
End: 2023-11-10

## 2023-11-10 NOTE — TELEPHONE ENCOUNTER
Patient is being scheduled for a procedure with Dr Deonna Godoy and we are requesting clearance from Dr. Ashley Bridges. DOS: 01/12/2024  Procedure: Septoplasty, Partial resection of pankaj bullosa of bilateral middle nasal turbinates  Meds to hold: Ibuprofen x 1 week    Please advise. Thank you!

## 2023-11-10 NOTE — TELEPHONE ENCOUNTER
Patient is being scheduled for a procedure with Dr Tiarra Mcclure and we are requesting clearance from Pulmonary. DOS: 01/12/2024  Procedure: Septoplasty, Partial resection of pankaj bullosa of bilateral middle nasal turbinates  Meds to hold: Ibuprofen x 1 week. Please advise. Thank you!

## 2023-11-13 NOTE — TELEPHONE ENCOUNTER
Called and left message for patient to call back and schedule pre op appointment before surgery with Dr. Andrew Burton

## 2023-11-15 ENCOUNTER — OFFICE VISIT (OUTPATIENT)
Dept: FAMILY MEDICINE CLINIC | Age: 63
End: 2023-11-15
Payer: MEDICAID

## 2023-11-15 VITALS
SYSTOLIC BLOOD PRESSURE: 104 MMHG | DIASTOLIC BLOOD PRESSURE: 60 MMHG | RESPIRATION RATE: 12 BRPM | WEIGHT: 176.6 LBS | HEART RATE: 90 BPM | OXYGEN SATURATION: 94 % | HEIGHT: 65 IN | BODY MASS INDEX: 29.42 KG/M2 | TEMPERATURE: 97.5 F

## 2023-11-15 DIAGNOSIS — G89.4 CHRONIC PAIN DISORDER: ICD-10-CM

## 2023-11-15 DIAGNOSIS — Z12.31 ENCOUNTER FOR SCREENING MAMMOGRAM FOR MALIGNANT NEOPLASM OF BREAST: ICD-10-CM

## 2023-11-15 DIAGNOSIS — E66.3 OVERWEIGHT WITH BODY MASS INDEX (BMI) OF 29 TO 29.9 IN ADULT: Primary | ICD-10-CM

## 2023-11-15 PROCEDURE — 99214 OFFICE O/P EST MOD 30 MIN: CPT | Performed by: NURSE PRACTITIONER

## 2023-11-15 SDOH — ECONOMIC STABILITY: FOOD INSECURITY: WITHIN THE PAST 12 MONTHS, THE FOOD YOU BOUGHT JUST DIDN'T LAST AND YOU DIDN'T HAVE MONEY TO GET MORE.: NEVER TRUE

## 2023-11-15 SDOH — ECONOMIC STABILITY: FOOD INSECURITY: WITHIN THE PAST 12 MONTHS, YOU WORRIED THAT YOUR FOOD WOULD RUN OUT BEFORE YOU GOT MONEY TO BUY MORE.: NEVER TRUE

## 2023-11-15 SDOH — ECONOMIC STABILITY: HOUSING INSECURITY
IN THE LAST 12 MONTHS, WAS THERE A TIME WHEN YOU DID NOT HAVE A STEADY PLACE TO SLEEP OR SLEPT IN A SHELTER (INCLUDING NOW)?: NO

## 2023-11-15 SDOH — ECONOMIC STABILITY: INCOME INSECURITY: HOW HARD IS IT FOR YOU TO PAY FOR THE VERY BASICS LIKE FOOD, HOUSING, MEDICAL CARE, AND HEATING?: NOT HARD AT ALL

## 2023-11-15 NOTE — PROGRESS NOTES
Future    Encounter for screening mammogram for malignant neoplasm of breast  -     KATHY DIGITAL SCREEN W OR WO CAD BILATERAL; Future    Chronic pain disorder      - unfortunately her BMI is < 30 so does not meet criteria for weight loss meds  - discussed healthy diet/exercise  - obtain above labs  - schedule mammogram  - discussed that I will not prescribe narcotics for her chronic pain    DISPOSITION    Return if symptoms worsen or fail to improve. Citlali Kumar released without restrictions. PATIENT COUNSELING    Counseling was provided today regarding the following topics: Healthy eating habits, Regular exercise, substance abuse and healthy sleep habits. Citlali Kumar received counseling on the following healthy behaviors: medication adherence    Patient given educational materials on: See Attached    I have instructed Citlali Kumar to complete a self tracking handout on none and instructed them to bring it with them to her next appointment. Barriers to learning and self management: none    Discussed use, benefit, and side effects of prescribed medications. Barriers to medication compliance addressed. All patient questions answered. Pt voiced understanding.        Electronically signed by JAMEY Kirkland CNP on 11/15/2023 at 2:20 PM

## 2023-11-26 PROBLEM — J34.2 DEVIATED NASAL SEPTUM: Status: ACTIVE | Noted: 2023-11-26

## 2023-11-26 PROBLEM — J34.89 CONCHA BULLOSA: Status: ACTIVE | Noted: 2023-11-26

## 2023-12-05 ENCOUNTER — TELEPHONE (OUTPATIENT)
Dept: ENT CLINIC | Age: 63
End: 2023-12-05

## 2023-12-05 NOTE — TELEPHONE ENCOUNTER
I called patient to see if she could reach out to Pulmonary to get scheduled for her pre op. She stated that she was going to cancel surgery at this time due to some family issues. She will c/b prn.

## 2024-02-01 RX ORDER — DIPHENHYDRAMINE HCL 25 MG
CAPSULE ORAL
Qty: 60 CAPSULE | Refills: 3 | Status: SHIPPED | OUTPATIENT
Start: 2024-02-01

## 2024-02-23 ENCOUNTER — HOSPITAL ENCOUNTER (OUTPATIENT)
Age: 64
Discharge: HOME | End: 2024-02-23
Payer: MEDICAID

## 2024-02-23 LAB
ALBUMIN SERPL BCG-MCNC: 4.2 G/DL (ref 3.5–5.1)
ALP SERPL-CCNC: 159 U/L (ref 38–126)
ALT SERPL W/O P-5'-P-CCNC: 15 U/L (ref 11–66)
ANION GAP SERPL CALC-SCNC: 10 MEQ/L (ref 8–16)
AST SERPL-CCNC: 17 U/L (ref 5–40)
BASOPHILS ABSOLUTE: 0.1 THOU/MM3 (ref 0–0.1)
BASOPHILS NFR BLD AUTO: 1.1 %
BILIRUB CONJ SERPL-MCNC: < 0.2 MG/DL (ref 0–0.3)
BILIRUB SERPL-MCNC: 0.2 MG/DL (ref 0.3–1.2)
BUN SERPL-MCNC: 10 MG/DL (ref 7–22)
CALCIUM SERPL-MCNC: 9.2 MG/DL (ref 8.5–10.5)
CHLORIDE SERPL-SCNC: 101 MEQ/L (ref 98–111)
CO2 SERPL-SCNC: 29 MEQ/L (ref 23–33)
CREAT SERPL-MCNC: 0.7 MG/DL (ref 0.4–1.2)
DEPRECATED RDW RBC AUTO: 49.2 FL (ref 35–45)
EOSINOPHIL NFR BLD AUTO: 1.6 %
EOSINOPHILS ABSOLUTE: 0.1 THOU/MM3 (ref 0–0.4)
ERYTHROCYTE [DISTWIDTH] IN BLOOD BY AUTOMATED COUNT: 13.8 % (ref 11.5–14.5)
GFR SERPL CREATININE-BSD FRML MDRD: > 60 ML/MIN/1.73M2
GLUCOSE SERPL-MCNC: 77 MG/DL (ref 70–108)
HCT VFR BLD AUTO: 43.9 % (ref 37–47)
HGB BLD-MCNC: 14 GM/DL (ref 12–16)
IMM GRANULOCYTES # BLD AUTO: 0.03 THOU/MM3 (ref 0–0.07)
IMM GRANULOCYTES NFR BLD AUTO: 0.4 %
LYMPHOCYTES ABSOLUTE: 2.7 THOU/MM3 (ref 1–4.8)
LYMPHOCYTES NFR BLD AUTO: 36 %
MCH RBC QN AUTO: 30.9 PG (ref 26–33)
MCHC RBC AUTO-ENTMCNC: 31.9 GM/DL (ref 32.2–35.5)
MCV RBC AUTO: 96.9 FL (ref 81–99)
MONOCYTES ABSOLUTE: 0.6 THOU/MM3 (ref 0.4–1.3)
MONOCYTES NFR BLD AUTO: 7.8 %
NEUTROPHILS NFR BLD AUTO: 53.1 %
NRBC BLD AUTO-RTO: 0 /100 WBC
PHOSPHATE SERPL-MCNC: 3.7 MG/DL (ref 2.4–4.7)
PLATELET # BLD AUTO: 276 THOU/MM3 (ref 130–400)
PMV BLD AUTO: 10.3 FL (ref 9.4–12.4)
POTASSIUM SERPL-SCNC: 4.3 MEQ/L (ref 3.5–5.2)
PROT SERPL-MCNC: 7 G/DL (ref 6.1–8)
RBC # BLD AUTO: 4.53 MILL/MM3 (ref 4.2–5.4)
SEGMENTED NEUTROPHILS ABSOLUTE COUNT: 4 THOU/MM3 (ref 1.8–7.7)
SODIUM SERPL-SCNC: 140 MEQ/L (ref 135–145)
WBC # BLD AUTO: 7.6 THOU/MM3 (ref 4.8–10.8)

## 2024-02-23 PROCEDURE — 82247 BILIRUBIN TOTAL: CPT

## 2024-02-23 PROCEDURE — 80069 RENAL FUNCTION PANEL: CPT

## 2024-02-23 PROCEDURE — 36415 COLL VENOUS BLD VENIPUNCTURE: CPT

## 2024-02-23 PROCEDURE — 85025 COMPLETE CBC W/AUTO DIFF WBC: CPT

## 2024-02-23 PROCEDURE — 87536 HIV-1 QUANT&REVRSE TRNSCRPJ: CPT

## 2024-02-23 PROCEDURE — 86361 T CELL ABSOLUTE COUNT: CPT

## 2024-02-23 PROCEDURE — 84075 ASSAY ALKALINE PHOSPHATASE: CPT

## 2024-02-23 PROCEDURE — 82248 BILIRUBIN DIRECT: CPT

## 2024-02-23 PROCEDURE — 84450 TRANSFERASE (AST) (SGOT): CPT

## 2024-02-23 PROCEDURE — 84460 ALANINE AMINO (ALT) (SGPT): CPT

## 2024-02-23 PROCEDURE — 84155 ASSAY OF PROTEIN SERUM: CPT

## 2024-02-24 LAB — CD4 T-HELPER CELLS: NORMAL

## 2024-02-25 LAB
HIV1 RNA # SERPL NAA+PROBE: ABNORMAL CPY/ML
HIV1 RNA SERPL NAA+PROBE-LOG#: < 1.47 LOG CPY/ML
HIV1 RNA SERPL QL NAA+PROBE: DETECTED

## 2024-03-18 RX ORDER — MIRTAZAPINE 30 MG/1
TABLET, FILM COATED ORAL
Qty: 90 TABLET | Refills: 1 | Status: SHIPPED | OUTPATIENT
Start: 2024-03-18

## 2024-03-18 RX ORDER — LORATADINE 10 MG/1
10 TABLET ORAL DAILY
Qty: 90 TABLET | Refills: 1 | Status: SHIPPED | OUTPATIENT
Start: 2024-03-18

## 2024-03-18 NOTE — TELEPHONE ENCOUNTER
Recent Visits  Date Type Provider Dept   11/15/23 Office Visit Reece Mgcarry APRN - CNP [facility]   01/27/23 Office Visit Reece Mcgarry APRN - CNP [facility]   Showing recent visits within past 540 days with a meds authorizing provider and meeting all other requirements  Future Appointments  No visits were found meeting these conditions.  Showing future appointments within next 150 days with a meds authorizing provider and meeting all other requirements

## 2024-03-27 ENCOUNTER — OFFICE VISIT (OUTPATIENT)
Dept: FAMILY MEDICINE CLINIC | Age: 64
End: 2024-03-27
Payer: MEDICAID

## 2024-03-27 VITALS
TEMPERATURE: 97.9 F | WEIGHT: 173.4 LBS | RESPIRATION RATE: 18 BRPM | SYSTOLIC BLOOD PRESSURE: 124 MMHG | OXYGEN SATURATION: 95 % | DIASTOLIC BLOOD PRESSURE: 68 MMHG | HEIGHT: 65 IN | BODY MASS INDEX: 28.89 KG/M2 | HEART RATE: 82 BPM

## 2024-03-27 DIAGNOSIS — J44.9 CHRONIC OBSTRUCTIVE PULMONARY DISEASE, UNSPECIFIED COPD TYPE (HCC): ICD-10-CM

## 2024-03-27 DIAGNOSIS — R22.41 KNEE MASS, RIGHT: Primary | ICD-10-CM

## 2024-03-27 DIAGNOSIS — R22.42 KNEE MASS, LEFT: ICD-10-CM

## 2024-03-27 PROCEDURE — 99214 OFFICE O/P EST MOD 30 MIN: CPT | Performed by: NURSE PRACTITIONER

## 2024-03-27 RX ORDER — ALBUTEROL SULFATE 90 UG/1
AEROSOL, METERED RESPIRATORY (INHALATION)
Qty: 8.5 G | Refills: 2 | Status: SHIPPED | OUTPATIENT
Start: 2024-03-27

## 2024-03-27 ASSESSMENT — PATIENT HEALTH QUESTIONNAIRE - PHQ9
2. FEELING DOWN, DEPRESSED OR HOPELESS: NOT AT ALL
SUM OF ALL RESPONSES TO PHQ QUESTIONS 1-9: 2
9. THOUGHTS THAT YOU WOULD BE BETTER OFF DEAD, OR OF HURTING YOURSELF: NOT AT ALL
7. TROUBLE CONCENTRATING ON THINGS, SUCH AS READING THE NEWSPAPER OR WATCHING TELEVISION: NOT AT ALL
5. POOR APPETITE OR OVEREATING: SEVERAL DAYS
3. TROUBLE FALLING OR STAYING ASLEEP: NOT AT ALL
10. IF YOU CHECKED OFF ANY PROBLEMS, HOW DIFFICULT HAVE THESE PROBLEMS MADE IT FOR YOU TO DO YOUR WORK, TAKE CARE OF THINGS AT HOME, OR GET ALONG WITH OTHER PEOPLE: NOT DIFFICULT AT ALL
1. LITTLE INTEREST OR PLEASURE IN DOING THINGS: NOT AT ALL
8. MOVING OR SPEAKING SO SLOWLY THAT OTHER PEOPLE COULD HAVE NOTICED. OR THE OPPOSITE, BEING SO FIGETY OR RESTLESS THAT YOU HAVE BEEN MOVING AROUND A LOT MORE THAN USUAL: NOT AT ALL
SUM OF ALL RESPONSES TO PHQ QUESTIONS 1-9: 2
SUM OF ALL RESPONSES TO PHQ9 QUESTIONS 1 & 2: 0
SUM OF ALL RESPONSES TO PHQ QUESTIONS 1-9: 2
6. FEELING BAD ABOUT YOURSELF - OR THAT YOU ARE A FAILURE OR HAVE LET YOURSELF OR YOUR FAMILY DOWN: NOT AT ALL
4. FEELING TIRED OR HAVING LITTLE ENERGY: SEVERAL DAYS
SUM OF ALL RESPONSES TO PHQ QUESTIONS 1-9: 2

## 2024-03-27 NOTE — PROGRESS NOTES
Use    Smoking status: Some Days     Current packs/day: 0.50     Average packs/day: 0.5 packs/day for 49.7 years (24.9 ttl pk-yrs)     Types: Cigarettes     Start date: 6/27/1974     Passive exposure: Current    Smokeless tobacco: Never    Tobacco comments:     Is not ready to quit at this time   Vaping Use    Vaping Use: Never used   Substance and Sexual Activity    Alcohol use: No     Alcohol/week: 0.0 standard drinks of alcohol    Drug use: No    Sexual activity: Not Currently   Other Topics Concern    Not on file   Social History Narrative    Not on file     Social Determinants of Health     Financial Resource Strain: Low Risk  (11/15/2023)    Overall Financial Resource Strain (CARDIA)     Difficulty of Paying Living Expenses: Not hard at all   Food Insecurity: Not on file (11/15/2023)   Transportation Needs: Unknown (11/15/2023)    PRAPARE - Transportation     Lack of Transportation (Medical): Not on file     Lack of Transportation (Non-Medical): No   Physical Activity: Not on file   Stress: Not on file   Social Connections: Not on file   Intimate Partner Violence: Not on file   Housing Stability: Unknown (11/15/2023)    Housing Stability Vital Sign     Unable to Pay for Housing in the Last Year: Not on file     Number of Places Lived in the Last Year: Not on file     Unstable Housing in the Last Year: No       Family History   Problem Relation Age of Onset    Asthma Mother     Heart Disease Mother     Uterine Cancer Mother     Asthma Father     Colon Cancer Father          I have reviewed the patient's past medical history, past surgical history, allergies, medications, social and family history and I have made updates where appropriate.      Review of Systems  Positive responses are highlighted in bold    Constitutional:  Fever, Chills, Night Sweats, Fatigue, Unexpected changes in weight  Eyes:  Eye discharge, Eye pain, Eye redness, Visual disturbances   HENT:  Ear pain, Tinnitus, Nosebleeds, Trouble

## 2024-03-27 NOTE — TELEPHONE ENCOUNTER
Received refill request for albuterol hfa. Medication was last ordered by maurizio stein. Medication was last ordered on 12-18-23 with 3 refills.   Patient was last seen in the office 7-11-23. Patient has a scheduled follow up 7-11-24.   Medication needs to be sent to [facility].

## 2024-04-04 ENCOUNTER — COMMUNITY OUTREACH (OUTPATIENT)
Dept: FAMILY MEDICINE CLINIC | Age: 64
End: 2024-04-04

## 2024-04-12 ENCOUNTER — HOSPITAL ENCOUNTER (OUTPATIENT)
Dept: WOMENS IMAGING | Age: 64
Discharge: HOME | End: 2024-04-12
Payer: MEDICAID

## 2024-04-12 DIAGNOSIS — Z12.31 ENCOUNTER FOR SCREENING MAMMOGRAM FOR MALIGNANT NEOPLASM OF BREAST: ICD-10-CM

## 2024-04-12 PROCEDURE — 77063 BREAST TOMOSYNTHESIS BI: CPT

## 2024-04-15 ENCOUNTER — TELEPHONE (OUTPATIENT)
Dept: FAMILY MEDICINE CLINIC | Age: 64
End: 2024-04-15

## 2024-04-15 NOTE — TELEPHONE ENCOUNTER
----- Message from JAMEY Pérez - CNP sent at 4/15/2024  8:26 AM EDT -----  Let Kaylyn know her mammogram was normal.

## 2024-04-17 RX ORDER — DICYCLOMINE HCL 20 MG
TABLET ORAL
Qty: 360 TABLET | Refills: 1 | Status: SHIPPED | OUTPATIENT
Start: 2024-04-17

## 2024-04-24 NOTE — TELEPHONE ENCOUNTER
Recent Visits  Date Type Provider Dept   03/27/24 Office Visit Reece Mcgarry APRN - CNP [facility]   11/15/23 Office Visit Reece Mcgarry APRN - CNP [facility]   01/27/23 Office Visit Reece Mcgarry APRN - CNP [facility]   Showing recent visits within past 540 days with a meds authorizing provider and meeting all other requirements  Future Appointments  No visits were found meeting these conditions.  Showing future appointments within next 150 days with a meds authorizing provider and meeting all other requirements

## 2024-05-27 DIAGNOSIS — J44.9 CHRONIC OBSTRUCTIVE PULMONARY DISEASE, UNSPECIFIED COPD TYPE (HCC): ICD-10-CM

## 2024-05-28 NOTE — TELEPHONE ENCOUNTER
Received refill request for albuterol hfa. Medication was last ordered by maurizio stein. Medication was last ordered on 3-27-24 with 2 refills.   Patient was last seen in the office 7-11-23. Patient has a scheduled follow up 7-11-24.   Medication needs to be sent to [facility].

## 2024-05-29 RX ORDER — ALBUTEROL SULFATE 90 UG/1
AEROSOL, METERED RESPIRATORY (INHALATION)
Qty: 8.5 G | Refills: 2 | Status: SHIPPED | OUTPATIENT
Start: 2024-05-29

## 2024-07-15 NOTE — TELEPHONE ENCOUNTER
Recent Visits  Date Type Provider Dept   11/15/23 Office Visit Reece Mcgarry APRN - CNP [facility]   01/27/23 Office Visit Reece Mcgarry APRN - CNP [facility]   Showing recent visits within past 540 days with a meds authorizing provider and meeting all other requirements  Future Appointments  No visits were found meeting these conditions.  Showing future appointments within next 150 days with a meds authorizing provider and meeting all other requirements      
37.1
PAIN

## 2024-08-12 ENCOUNTER — TELEPHONE (OUTPATIENT)
Dept: FAMILY MEDICINE CLINIC | Age: 64
End: 2024-08-12

## 2024-08-12 NOTE — TELEPHONE ENCOUNTER
Marialuisa from [facility] called stating Kaylyn had passed on 24 at home and is asking if GRACE Ritchie will sign the death certificate.    Please advise

## (undated) DEVICE — THE MILL DISPOSABLE - MEDIUM

## (undated) DEVICE — AGENT HEMOSTATIC SURGIFLOW MATRIX KIT W/THROMBIN

## (undated) DEVICE — Device

## (undated) DEVICE — CODMAN® SURGICAL PATTIES 1" X 1" (2.54CM X 2.54CM): Brand: CODMAN®

## (undated) DEVICE — GLOVE SURG SZ 85 L12IN FNGR ORTHO 126MIL CRM LTX FREE

## (undated) DEVICE — BUR CUT RND FLUT AGRSV 4.0MM

## (undated) DEVICE — BASIC SINGLE BASIN BTC-LF: Brand: MEDLINE INDUSTRIES, INC.

## (undated) DEVICE — PAD,NON-ADHERENT,3X8,STERILE,LF,1/PK: Brand: MEDLINE

## (undated) DEVICE — DRAIN SURG 10FR PVC TB W/ TRCR MID PERF NO RESVR HUBLESS

## (undated) DEVICE — PROBE STIM 3 MM FOR PEDCL SCREW DISP

## (undated) DEVICE — JCKSON TBL POSTNER NO HD REST: Brand: MEDLINE INDUSTRIES, INC.

## (undated) DEVICE — BLADE ES L4IN INSUL EDGE

## (undated) DEVICE — AGENT HEMSTAT W2XL4IN OXIDIZED REGENERATED CELOS ABSRB SFT

## (undated) DEVICE — PACK-MAJOR

## (undated) DEVICE — 4.0MM PRECISION ROUND

## (undated) DEVICE — SUTURE VCRL SZ 1 L18IN ABSRB VLT CTX L48MM 1/2 CIR J765D

## (undated) DEVICE — GOWN,SIRUS,NON REINFRCD,LARGE,SET IN SL: Brand: MEDLINE

## (undated) DEVICE — HEAD POSITIONER, SURGICAL: Brand: DEROYAL

## (undated) DEVICE — BUR CUT RND FLUT AGRSV 3.0MM

## (undated) DEVICE — DRESSING TRNSPAR W8XL12IN FLM SURESITE 123

## (undated) DEVICE — BIPOLAR SEALER 23-112-1 AQM 6.0: Brand: AQUAMANTYS ®

## (undated) DEVICE — SEALANT SKIN CLOSURE 2 CC DURASEAL

## (undated) DEVICE — KIT EVAC 400CC PVC RADPQ Y CONN

## (undated) DEVICE — ADHESIVE SKIN CLSR 0.7ML TOP DERMBND ADV

## (undated) DEVICE — SEALER ELECSURG AQUAMANTYS 6.0

## (undated) DEVICE — SUTURE PROL SZ 5-0 L36IN NONABSORBABLE BLU L17MM RB-1 1/2 8556H